# Patient Record
Sex: MALE | Race: WHITE | Employment: UNEMPLOYED | URBAN - METROPOLITAN AREA
[De-identification: names, ages, dates, MRNs, and addresses within clinical notes are randomized per-mention and may not be internally consistent; named-entity substitution may affect disease eponyms.]

---

## 2018-04-30 ENCOUNTER — HOSPITAL ENCOUNTER (OUTPATIENT)
Age: 33
Discharge: HOME | End: 2018-04-30
Payer: COMMERCIAL

## 2018-04-30 LAB
ANION GAP SERPL CALCULATED.3IONS-SCNC: 13 MMOL/L (ref 7–16)
BASOPHILS ABSOLUTE: 0.04 E9/L (ref 0–0.2)
BASOPHILS RELATIVE PERCENT: 0.7 % (ref 0–2)
BUN BLDV-MCNC: 14 MG/DL (ref 6–20)
CALCIUM SERPL-MCNC: 8.9 MG/DL (ref 8.6–10.2)
CHLORIDE BLD-SCNC: 106 MMOL/L (ref 98–107)
CO2: 21 MMOL/L (ref 22–29)
CREAT SERPL-MCNC: 2.3 MG/DL (ref 0.7–1.2)
EOSINOPHILS ABSOLUTE: 0.33 E9/L (ref 0.05–0.5)
EOSINOPHILS RELATIVE PERCENT: 5.5 % (ref 0–6)
GFR AFRICAN AMERICAN: 40
GFR NON-AFRICAN AMERICAN: 33 ML/MIN/1.73
GLUCOSE BLD-MCNC: 85 MG/DL (ref 74–109)
HCT VFR BLD CALC: 42.2 % (ref 37–54)
HEMOGLOBIN: 13.9 G/DL (ref 12.5–16.5)
IMMATURE GRANULOCYTES #: 0.01 E9/L
IMMATURE GRANULOCYTES %: 0.2 % (ref 0–5)
LYMPHOCYTES ABSOLUTE: 1.92 E9/L (ref 1.5–4)
LYMPHOCYTES RELATIVE PERCENT: 32.3 % (ref 20–42)
MCH RBC QN AUTO: 30.7 PG (ref 26–35)
MCHC RBC AUTO-ENTMCNC: 32.9 % (ref 32–34.5)
MCV RBC AUTO: 93.2 FL (ref 80–99.9)
MONOCYTES ABSOLUTE: 0.49 E9/L (ref 0.1–0.95)
MONOCYTES RELATIVE PERCENT: 8.2 % (ref 2–12)
NEUTROPHILS ABSOLUTE: 3.16 E9/L (ref 1.8–7.3)
NEUTROPHILS RELATIVE PERCENT: 53.1 % (ref 43–80)
PDW BLD-RTO: 14.6 FL (ref 11.5–15)
PHOSPHORUS: 2.3 MG/DL (ref 2.5–4.5)
PLATELET # BLD: 196 E9/L (ref 130–450)
PMV BLD AUTO: 10.9 FL (ref 7–12)
POTASSIUM SERPL-SCNC: 4.4 MMOL/L (ref 3.5–5)
RBC # BLD: 4.53 E12/L (ref 3.8–5.8)
SODIUM BLD-SCNC: 140 MMOL/L (ref 132–146)
WBC # BLD: 6 E9/L (ref 4.5–11.5)

## 2018-04-30 PROCEDURE — 85025 COMPLETE CBC W/AUTO DIFF WBC: CPT

## 2018-04-30 PROCEDURE — 80048 BASIC METABOLIC PNL TOTAL CA: CPT

## 2018-04-30 PROCEDURE — 36415 COLL VENOUS BLD VENIPUNCTURE: CPT

## 2018-04-30 PROCEDURE — 84100 ASSAY OF PHOSPHORUS: CPT

## 2018-09-04 ENCOUNTER — HOSPITAL ENCOUNTER (OUTPATIENT)
Age: 33
Discharge: HOME | End: 2018-09-04
Payer: COMMERCIAL

## 2018-09-04 LAB
ANION GAP SERPL CALCULATED.3IONS-SCNC: 16 MMOL/L (ref 7–16)
BASOPHILS ABSOLUTE: 0.02 E9/L (ref 0–0.2)
BASOPHILS RELATIVE PERCENT: 0.4 % (ref 0–2)
BUN BLDV-MCNC: 20 MG/DL (ref 6–20)
CALCIUM SERPL-MCNC: 8.7 MG/DL (ref 8.6–10.2)
CHLORIDE BLD-SCNC: 104 MMOL/L (ref 98–107)
CHOLESTEROL, FASTING: 139 MG/DL (ref 0–199)
CO2: 23 MMOL/L (ref 22–29)
CREAT SERPL-MCNC: 2.4 MG/DL (ref 0.7–1.2)
CREATININE URINE: 167 MG/DL (ref 40–278)
EOSINOPHILS ABSOLUTE: 0.18 E9/L (ref 0.05–0.5)
EOSINOPHILS RELATIVE PERCENT: 3.8 % (ref 0–6)
GFR AFRICAN AMERICAN: 38
GFR NON-AFRICAN AMERICAN: 31 ML/MIN/1.73
GLUCOSE BLD-MCNC: 171 MG/DL (ref 74–109)
HCT VFR BLD CALC: 39.3 % (ref 37–54)
HDLC SERPL-MCNC: 43 MG/DL
HEMOGLOBIN: 12.9 G/DL (ref 12.5–16.5)
IMMATURE GRANULOCYTES #: 0.01 E9/L
IMMATURE GRANULOCYTES %: 0.2 % (ref 0–5)
LDL CHOLESTEROL CALCULATED: 81 MG/DL (ref 0–99)
LYMPHOCYTES ABSOLUTE: 1.38 E9/L (ref 1.5–4)
LYMPHOCYTES RELATIVE PERCENT: 29.2 % (ref 20–42)
MCH RBC QN AUTO: 29.5 PG (ref 26–35)
MCHC RBC AUTO-ENTMCNC: 32.8 % (ref 32–34.5)
MCV RBC AUTO: 89.9 FL (ref 80–99.9)
MONOCYTES ABSOLUTE: 0.43 E9/L (ref 0.1–0.95)
MONOCYTES RELATIVE PERCENT: 9.1 % (ref 2–12)
NEUTROPHILS ABSOLUTE: 2.7 E9/L (ref 1.8–7.3)
NEUTROPHILS RELATIVE PERCENT: 57.3 % (ref 43–80)
PARATHYROID HORMONE INTACT: 210 PG/ML (ref 15–65)
PDW BLD-RTO: 13.6 FL (ref 11.5–15)
PHOSPHORUS: 3.4 MG/DL (ref 2.5–4.5)
PLATELET # BLD: 212 E9/L (ref 130–450)
PMV BLD AUTO: 11.3 FL (ref 7–12)
POTASSIUM SERPL-SCNC: 4.2 MMOL/L (ref 3.5–5)
PROTEIN PROTEIN: 24 MG/DL (ref 0–12)
PROTEIN/CREAT RATIO: 0.1
PROTEIN/CREAT RATIO: 0.1 (ref 0–0.2)
RBC # BLD: 4.37 E12/L (ref 3.8–5.8)
SODIUM BLD-SCNC: 143 MMOL/L (ref 132–146)
TRIGLYCERIDE, FASTING: 76 MG/DL (ref 0–149)
VITAMIN D 25-HYDROXY: 5 NG/ML (ref 30–100)
VLDLC SERPL CALC-MCNC: 15 MG/DL
WBC # BLD: 4.7 E9/L (ref 4.5–11.5)

## 2018-09-04 PROCEDURE — 84100 ASSAY OF PHOSPHORUS: CPT

## 2018-09-04 PROCEDURE — 80048 BASIC METABOLIC PNL TOTAL CA: CPT

## 2018-09-04 PROCEDURE — 36415 COLL VENOUS BLD VENIPUNCTURE: CPT

## 2018-09-04 PROCEDURE — 85025 COMPLETE CBC W/AUTO DIFF WBC: CPT

## 2018-09-04 PROCEDURE — 84156 ASSAY OF PROTEIN URINE: CPT

## 2018-09-04 PROCEDURE — 82306 VITAMIN D 25 HYDROXY: CPT

## 2018-09-04 PROCEDURE — 83970 ASSAY OF PARATHORMONE: CPT

## 2018-09-04 PROCEDURE — 80061 LIPID PANEL: CPT

## 2018-09-04 PROCEDURE — 82570 ASSAY OF URINE CREATININE: CPT

## 2018-09-19 ENCOUNTER — HOSPITAL ENCOUNTER (INPATIENT)
Age: 33
LOS: 4 days | Discharge: HOME | DRG: 420 | End: 2018-09-23
Attending: EMERGENCY MEDICINE | Admitting: INTERNAL MEDICINE
Payer: COMMERCIAL

## 2018-09-19 ENCOUNTER — TELEPHONE (OUTPATIENT)
Dept: ADMINISTRATIVE | Age: 33
End: 2018-09-19

## 2018-09-19 ENCOUNTER — APPOINTMENT (OUTPATIENT)
Dept: GENERAL RADIOLOGY | Age: 33
DRG: 420 | End: 2018-09-19
Payer: COMMERCIAL

## 2018-09-19 ENCOUNTER — APPOINTMENT (OUTPATIENT)
Dept: CT IMAGING | Age: 33
DRG: 420 | End: 2018-09-19
Payer: COMMERCIAL

## 2018-09-19 DIAGNOSIS — N17.9 AKI (ACUTE KIDNEY INJURY) (HCC): ICD-10-CM

## 2018-09-19 DIAGNOSIS — E13.10 DIABETIC KETOACIDOSIS WITHOUT COMA ASSOCIATED WITH OTHER SPECIFIED DIABETES MELLITUS (HCC): Primary | ICD-10-CM

## 2018-09-19 DIAGNOSIS — E87.1 HYPONATREMIA: ICD-10-CM

## 2018-09-19 DIAGNOSIS — E11.10 DIABETIC KETOACIDOSIS WITHOUT COMA ASSOCIATED WITH TYPE 2 DIABETES MELLITUS (HCC): ICD-10-CM

## 2018-09-19 DIAGNOSIS — G93.40 ACUTE ENCEPHALOPATHY: ICD-10-CM

## 2018-09-19 DIAGNOSIS — E87.5 HYPERKALEMIA: ICD-10-CM

## 2018-09-19 DIAGNOSIS — E11.9 DIABETES MELLITUS, NEW ONSET (HCC): ICD-10-CM

## 2018-09-19 LAB
ACETAMINOPHEN LEVEL: <5 MCG/ML (ref 10–30)
ALBUMIN SERPL-MCNC: 4.9 G/DL (ref 3.5–5.2)
ALP BLD-CCNC: 154 U/L (ref 40–129)
ALT SERPL-CCNC: 18 U/L (ref 0–40)
AMORPHOUS: ABNORMAL
AMPHETAMINE SCREEN, URINE: NOT DETECTED
ANION GAP SERPL CALCULATED.3IONS-SCNC: 16 MMOL/L (ref 7–16)
ANION GAP SERPL CALCULATED.3IONS-SCNC: 22 MMOL/L (ref 7–16)
AST SERPL-CCNC: 10 U/L (ref 0–39)
BACTERIA: ABNORMAL /HPF
BARBITURATE SCREEN URINE: NOT DETECTED
BASOPHILS ABSOLUTE: 0.04 E9/L (ref 0–0.2)
BASOPHILS RELATIVE PERCENT: 0.5 % (ref 0–2)
BENZODIAZEPINE SCREEN, URINE: NOT DETECTED
BETA-HYDROXYBUTYRATE: 3.07 MMOL/L (ref 0.02–0.27)
BILIRUB SERPL-MCNC: 1 MG/DL (ref 0–1.2)
BILIRUBIN URINE: NEGATIVE
BLOOD, URINE: ABNORMAL
BUN BLDV-MCNC: 74 MG/DL (ref 6–20)
BUN BLDV-MCNC: 74 MG/DL (ref 6–20)
CALCIUM SERPL-MCNC: 8.7 MG/DL (ref 8.6–10.2)
CALCIUM SERPL-MCNC: 9.2 MG/DL (ref 8.6–10.2)
CANNABINOID SCREEN URINE: NOT DETECTED
CHLORIDE BLD-SCNC: 82 MMOL/L (ref 98–107)
CHLORIDE BLD-SCNC: 96 MMOL/L (ref 98–107)
CHP ED QC CHECK: YES
CHP ED QC CHECK: YES
CLARITY: CLEAR
CO2: 16 MMOL/L (ref 22–29)
CO2: 18 MMOL/L (ref 22–29)
COCAINE METABOLITE SCREEN URINE: NOT DETECTED
COLOR: YELLOW
CREAT SERPL-MCNC: 4.5 MG/DL (ref 0.7–1.2)
CREAT SERPL-MCNC: 5.1 MG/DL (ref 0.7–1.2)
CRYSTALS, UA: ABNORMAL
EKG ATRIAL RATE: 63 BPM
EKG P AXIS: 41 DEGREES
EKG P-R INTERVAL: 198 MS
EKG Q-T INTERVAL: 440 MS
EKG QRS DURATION: 114 MS
EKG QTC CALCULATION (BAZETT): 450 MS
EKG R AXIS: 44 DEGREES
EKG T AXIS: 101 DEGREES
EKG VENTRICULAR RATE: 63 BPM
EOSINOPHILS ABSOLUTE: 0 E9/L (ref 0.05–0.5)
EOSINOPHILS RELATIVE PERCENT: 0 % (ref 0–6)
ETHANOL: <10 MG/DL (ref 0–0.08)
GFR AFRICAN AMERICAN: 16
GFR AFRICAN AMERICAN: 18
GFR NON-AFRICAN AMERICAN: 13 ML/MIN/1.73
GFR NON-AFRICAN AMERICAN: 15 ML/MIN/1.73
GLUCOSE BLD-MCNC: 1037 MG/DL (ref 74–109)
GLUCOSE BLD-MCNC: 1401 MG/DL (ref 74–109)
GLUCOSE BLD-MCNC: >1500 MG/DL (ref 74–109)
GLUCOSE BLD-MCNC: NORMAL MG/DL
GLUCOSE BLD-MCNC: NORMAL MG/DL
GLUCOSE URINE: >=1000 MG/DL
HCT VFR BLD CALC: 50.6 % (ref 37–54)
HEMOGLOBIN: 15.5 G/DL (ref 12.5–16.5)
IMMATURE GRANULOCYTES #: 0.02 E9/L
IMMATURE GRANULOCYTES %: 0.3 % (ref 0–5)
KETONES, URINE: NEGATIVE MG/DL
LACTIC ACID: 2.5 MMOL/L (ref 0.5–2.2)
LACTIC ACID: 2.6 MMOL/L (ref 0.5–2.2)
LEUKOCYTE ESTERASE, URINE: NEGATIVE
LIPASE: 29 U/L (ref 13–60)
LYMPHOCYTES ABSOLUTE: 0.61 E9/L (ref 1.5–4)
LYMPHOCYTES RELATIVE PERCENT: 8.3 % (ref 20–42)
MAGNESIUM: 3.2 MG/DL (ref 1.6–2.6)
MAGNESIUM: 3.3 MG/DL (ref 1.6–2.6)
MCH RBC QN AUTO: 29.8 PG (ref 26–35)
MCHC RBC AUTO-ENTMCNC: 30.6 % (ref 32–34.5)
MCV RBC AUTO: 97.3 FL (ref 80–99.9)
METER GLUCOSE: 301 MG/DL (ref 70–110)
METER GLUCOSE: >500 MG/DL (ref 70–110)
METHADONE SCREEN, URINE: NOT DETECTED
MONOCYTES ABSOLUTE: 0.42 E9/L (ref 0.1–0.95)
MONOCYTES RELATIVE PERCENT: 5.7 % (ref 2–12)
NEUTROPHILS ABSOLUTE: 6.28 E9/L (ref 1.8–7.3)
NEUTROPHILS RELATIVE PERCENT: 85.2 % (ref 43–80)
NITRITE, URINE: NEGATIVE
OPIATE SCREEN URINE: NOT DETECTED
OSMOLALITY: 385 MOSM/KG (ref 285–310)
PDW BLD-RTO: 13.2 FL (ref 11.5–15)
PH UA: 5.5 (ref 5–9)
PH VENOUS: 7.21 (ref 7.3–7.42)
PHENCYCLIDINE SCREEN URINE: NOT DETECTED
PHOSPHORUS: 4.1 MG/DL (ref 2.5–4.5)
PLATELET # BLD: 206 E9/L (ref 130–450)
PMV BLD AUTO: 13.5 FL (ref 7–12)
POTASSIUM SERPL-SCNC: 4.5 MMOL/L (ref 3.5–5)
POTASSIUM SERPL-SCNC: 6.8 MMOL/L (ref 3.5–5)
PROPOXYPHENE SCREEN: NOT DETECTED
PROTEIN UA: NEGATIVE MG/DL
RBC # BLD: 5.2 E12/L (ref 3.8–5.8)
RBC UA: ABNORMAL /HPF (ref 0–2)
SALICYLATE, SERUM: <0.3 MG/DL (ref 0–30)
SODIUM BLD-SCNC: 120 MMOL/L (ref 132–146)
SODIUM BLD-SCNC: 130 MMOL/L (ref 132–146)
SPECIFIC GRAVITY UA: <=1.005 (ref 1–1.03)
TOTAL PROTEIN: 8.4 G/DL (ref 6.4–8.3)
TRICYCLIC ANTIDEPRESSANTS SCREEN SERUM: NEGATIVE NG/ML
TROPONIN: <0.01 NG/ML (ref 0–0.03)
TROPONIN: <0.01 NG/ML (ref 0–0.03)
UROBILINOGEN, URINE: 0.2 E.U./DL
WBC # BLD: 7.4 E9/L (ref 4.5–11.5)
WBC UA: ABNORMAL /HPF (ref 0–5)

## 2018-09-19 PROCEDURE — 6360000002 HC RX W HCPCS: Performed by: STUDENT IN AN ORGANIZED HEALTH CARE EDUCATION/TRAINING PROGRAM

## 2018-09-19 PROCEDURE — 84100 ASSAY OF PHOSPHORUS: CPT

## 2018-09-19 PROCEDURE — 82947 ASSAY GLUCOSE BLOOD QUANT: CPT

## 2018-09-19 PROCEDURE — 96374 THER/PROPH/DIAG INJ IV PUSH: CPT

## 2018-09-19 PROCEDURE — 6360000002 HC RX W HCPCS: Performed by: INTERNAL MEDICINE

## 2018-09-19 PROCEDURE — 99285 EMERGENCY DEPT VISIT HI MDM: CPT

## 2018-09-19 PROCEDURE — 93005 ELECTROCARDIOGRAM TRACING: CPT | Performed by: STUDENT IN AN ORGANIZED HEALTH CARE EDUCATION/TRAINING PROGRAM

## 2018-09-19 PROCEDURE — 2000000000 HC ICU R&B

## 2018-09-19 PROCEDURE — 85025 COMPLETE CBC W/AUTO DIFF WBC: CPT

## 2018-09-19 PROCEDURE — 94664 DEMO&/EVAL PT USE INHALER: CPT

## 2018-09-19 PROCEDURE — 71045 X-RAY EXAM CHEST 1 VIEW: CPT

## 2018-09-19 PROCEDURE — 96375 TX/PRO/DX INJ NEW DRUG ADDON: CPT

## 2018-09-19 PROCEDURE — 82800 BLOOD PH: CPT

## 2018-09-19 PROCEDURE — 80053 COMPREHEN METABOLIC PANEL: CPT

## 2018-09-19 PROCEDURE — 2580000003 HC RX 258: Performed by: EMERGENCY MEDICINE

## 2018-09-19 PROCEDURE — 80048 BASIC METABOLIC PNL TOTAL CA: CPT

## 2018-09-19 PROCEDURE — 36415 COLL VENOUS BLD VENIPUNCTURE: CPT

## 2018-09-19 PROCEDURE — 84484 ASSAY OF TROPONIN QUANT: CPT

## 2018-09-19 PROCEDURE — 82010 KETONE BODYS QUAN: CPT

## 2018-09-19 PROCEDURE — 80307 DRUG TEST PRSMV CHEM ANLYZR: CPT

## 2018-09-19 PROCEDURE — 2580000003 HC RX 258: Performed by: STUDENT IN AN ORGANIZED HEALTH CARE EDUCATION/TRAINING PROGRAM

## 2018-09-19 PROCEDURE — 82962 GLUCOSE BLOOD TEST: CPT

## 2018-09-19 PROCEDURE — 83735 ASSAY OF MAGNESIUM: CPT

## 2018-09-19 PROCEDURE — 6370000000 HC RX 637 (ALT 250 FOR IP): Performed by: STUDENT IN AN ORGANIZED HEALTH CARE EDUCATION/TRAINING PROGRAM

## 2018-09-19 PROCEDURE — 2500000003 HC RX 250 WO HCPCS: Performed by: STUDENT IN AN ORGANIZED HEALTH CARE EDUCATION/TRAINING PROGRAM

## 2018-09-19 PROCEDURE — 83036 HEMOGLOBIN GLYCOSYLATED A1C: CPT

## 2018-09-19 PROCEDURE — 81001 URINALYSIS AUTO W/SCOPE: CPT

## 2018-09-19 PROCEDURE — 87149 DNA/RNA DIRECT PROBE: CPT

## 2018-09-19 PROCEDURE — 2580000003 HC RX 258: Performed by: INTERNAL MEDICINE

## 2018-09-19 PROCEDURE — 83605 ASSAY OF LACTIC ACID: CPT

## 2018-09-19 PROCEDURE — 6370000000 HC RX 637 (ALT 250 FOR IP): Performed by: EMERGENCY MEDICINE

## 2018-09-19 PROCEDURE — G0480 DRUG TEST DEF 1-7 CLASSES: HCPCS

## 2018-09-19 PROCEDURE — 83930 ASSAY OF BLOOD OSMOLALITY: CPT

## 2018-09-19 PROCEDURE — 83690 ASSAY OF LIPASE: CPT

## 2018-09-19 PROCEDURE — 82693 ASSAY OF ETHYLENE GLYCOL: CPT

## 2018-09-19 PROCEDURE — 87040 BLOOD CULTURE FOR BACTERIA: CPT

## 2018-09-19 RX ORDER — DEXTROSE MONOHYDRATE 25 G/50ML
12.5 INJECTION, SOLUTION INTRAVENOUS PRN
Status: DISCONTINUED | OUTPATIENT
Start: 2018-09-19 | End: 2018-09-22 | Stop reason: SDUPTHER

## 2018-09-19 RX ORDER — SODIUM POLYSTYRENE SULFONATE 15 G/60ML
15 SUSPENSION ORAL; RECTAL ONCE
Status: COMPLETED | OUTPATIENT
Start: 2018-09-19 | End: 2018-09-19

## 2018-09-19 RX ORDER — POTASSIUM CHLORIDE 7.45 MG/ML
10 INJECTION INTRAVENOUS PRN
Status: DISCONTINUED | OUTPATIENT
Start: 2018-09-19 | End: 2018-09-23 | Stop reason: HOSPADM

## 2018-09-19 RX ORDER — LABETALOL HYDROCHLORIDE 5 MG/ML
10 INJECTION, SOLUTION INTRAVENOUS EVERY 4 HOURS PRN
Status: DISCONTINUED | OUTPATIENT
Start: 2018-09-19 | End: 2018-09-23 | Stop reason: HOSPADM

## 2018-09-19 RX ORDER — POTASSIUM CHLORIDE 1.5 G/1.77G
20 POWDER, FOR SOLUTION ORAL 2 TIMES DAILY
Status: ON HOLD | COMMUNITY
End: 2018-09-23 | Stop reason: HOSPADM

## 2018-09-19 RX ORDER — 0.9 % SODIUM CHLORIDE 0.9 %
15 INTRAVENOUS SOLUTION INTRAVENOUS ONCE
Status: COMPLETED | OUTPATIENT
Start: 2018-09-19 | End: 2018-09-19

## 2018-09-19 RX ORDER — SODIUM CHLORIDE 0.9 % (FLUSH) 0.9 %
10 SYRINGE (ML) INJECTION PRN
Status: DISCONTINUED | OUTPATIENT
Start: 2018-09-19 | End: 2018-09-23 | Stop reason: HOSPADM

## 2018-09-19 RX ORDER — LOSARTAN POTASSIUM 100 MG/1
100 TABLET ORAL DAILY
COMMUNITY
End: 2018-11-07 | Stop reason: SDUPTHER

## 2018-09-19 RX ORDER — CALCIUM GLUCONATE 94 MG/ML
1 INJECTION, SOLUTION INTRAVENOUS ONCE
Status: COMPLETED | OUTPATIENT
Start: 2018-09-19 | End: 2018-09-19

## 2018-09-19 RX ORDER — 0.9 % SODIUM CHLORIDE 0.9 %
1000 INTRAVENOUS SOLUTION INTRAVENOUS ONCE
Status: COMPLETED | OUTPATIENT
Start: 2018-09-19 | End: 2018-09-19

## 2018-09-19 RX ORDER — HEPARIN SODIUM 10000 [USP'U]/ML
5000 INJECTION, SOLUTION INTRAVENOUS; SUBCUTANEOUS EVERY 8 HOURS SCHEDULED
Status: DISCONTINUED | OUTPATIENT
Start: 2018-09-19 | End: 2018-09-23 | Stop reason: HOSPADM

## 2018-09-19 RX ORDER — MAGNESIUM SULFATE 1 G/100ML
1 INJECTION INTRAVENOUS PRN
Status: DISCONTINUED | OUTPATIENT
Start: 2018-09-19 | End: 2018-09-21

## 2018-09-19 RX ORDER — SODIUM CHLORIDE 0.9 % (FLUSH) 0.9 %
10 SYRINGE (ML) INJECTION EVERY 12 HOURS SCHEDULED
Status: DISCONTINUED | OUTPATIENT
Start: 2018-09-19 | End: 2018-09-23 | Stop reason: HOSPADM

## 2018-09-19 RX ORDER — ONDANSETRON 2 MG/ML
4 INJECTION INTRAMUSCULAR; INTRAVENOUS ONCE
Status: COMPLETED | OUTPATIENT
Start: 2018-09-19 | End: 2018-09-19

## 2018-09-19 RX ORDER — DEXTROSE AND SODIUM CHLORIDE 5; .45 G/100ML; G/100ML
INJECTION, SOLUTION INTRAVENOUS CONTINUOUS PRN
Status: DISCONTINUED | OUTPATIENT
Start: 2018-09-19 | End: 2018-09-21

## 2018-09-19 RX ORDER — ONDANSETRON 2 MG/ML
4 INJECTION INTRAMUSCULAR; INTRAVENOUS EVERY 6 HOURS PRN
Status: DISCONTINUED | OUTPATIENT
Start: 2018-09-19 | End: 2018-09-23 | Stop reason: HOSPADM

## 2018-09-19 RX ORDER — SODIUM CHLORIDE 9 MG/ML
INJECTION, SOLUTION INTRAVENOUS CONTINUOUS
Status: DISCONTINUED | OUTPATIENT
Start: 2018-09-19 | End: 2018-09-21

## 2018-09-19 RX ADMIN — SODIUM CHLORIDE 11.3 UNITS/HR: 9 INJECTION, SOLUTION INTRAVENOUS at 17:21

## 2018-09-19 RX ADMIN — SODIUM POLYSTYRENE SULFONATE 15 G: 15 SUSPENSION ORAL; RECTAL at 17:03

## 2018-09-19 RX ADMIN — SODIUM BICARBONATE 50 MEQ: 84 INJECTION, SOLUTION INTRAVENOUS at 16:57

## 2018-09-19 RX ADMIN — SODIUM CHLORIDE 1701 ML: 9 INJECTION, SOLUTION INTRAVENOUS at 17:23

## 2018-09-19 RX ADMIN — HEPARIN SODIUM 5000 UNITS: 10000 INJECTION INTRAVENOUS; SUBCUTANEOUS at 22:35

## 2018-09-19 RX ADMIN — CALCIUM GLUCONATE 1 G: 98 INJECTION, SOLUTION INTRAVENOUS at 16:17

## 2018-09-19 RX ADMIN — SODIUM CHLORIDE: 9 INJECTION, SOLUTION INTRAVENOUS at 18:28

## 2018-09-19 RX ADMIN — Medication 10 ML: at 22:49

## 2018-09-19 RX ADMIN — INSULIN HUMAN 10 UNITS: 100 INJECTION, SOLUTION PARENTERAL at 16:55

## 2018-09-19 RX ADMIN — SODIUM CHLORIDE 1000 ML: 9 INJECTION, SOLUTION INTRAVENOUS at 16:08

## 2018-09-19 RX ADMIN — SODIUM CHLORIDE 1000 ML: 9 INJECTION, SOLUTION INTRAVENOUS at 16:52

## 2018-09-19 RX ADMIN — ONDANSETRON 4 MG: 2 SOLUTION INTRAMUSCULAR; INTRAVENOUS at 16:08

## 2018-09-19 RX ADMIN — ALBUTEROL SULFATE 10 MG: 2.5 SOLUTION RESPIRATORY (INHALATION) at 17:19

## 2018-09-19 ASSESSMENT — ENCOUNTER SYMPTOMS
NAUSEA: 1
EYE PAIN: 0
VOMITING: 1
COUGH: 0
SINUS PRESSURE: 0
WHEEZING: 0
SHORTNESS OF BREATH: 0
EYE DISCHARGE: 0
EYE REDNESS: 0
SORE THROAT: 0
BACK PAIN: 0
ABDOMINAL PAIN: 0
DIARRHEA: 0

## 2018-09-19 ASSESSMENT — PAIN SCALES - GENERAL
PAINLEVEL_OUTOF10: 0
PAINLEVEL_OUTOF10: 8
PAINLEVEL_OUTOF10: 0

## 2018-09-19 ASSESSMENT — PAIN DESCRIPTION - DESCRIPTORS: DESCRIPTORS: DULL

## 2018-09-19 ASSESSMENT — PAIN DESCRIPTION - ONSET: ONSET: ON-GOING

## 2018-09-19 ASSESSMENT — PAIN DESCRIPTION - FREQUENCY: FREQUENCY: CONTINUOUS

## 2018-09-19 ASSESSMENT — PAIN DESCRIPTION - LOCATION: LOCATION: ABDOMEN

## 2018-09-19 ASSESSMENT — PAIN DESCRIPTION - PAIN TYPE: TYPE: ACUTE PAIN

## 2018-09-19 ASSESSMENT — PAIN DESCRIPTION - ORIENTATION: ORIENTATION: UPPER;LOWER

## 2018-09-19 NOTE — H&P
tablet Take 1 tablet by mouth daily 3/13/17  Yes Makayla Champagne MD   aspirin 81 MG tablet Take 1 tablet by mouth daily 3/13/17  Yes Makayla Champagne MD   furosemide (LASIX) 20 MG tablet Take 0.5 tablets by mouth daily  Patient taking differently: Take 10 mg by mouth 2 times daily  3/13/17  Yes Makayla Champagne MD   metoprolol succinate (TOPROL XL) 100 MG extended release tablet Take 1 tablet by mouth daily  Patient taking differently: Take 200 mg by mouth daily  3/13/17  Yes Makayla Champagne MD   atorvastatin (LIPITOR) 20 MG tablet Take 1 tablet by mouth daily 3/13/17  Yes Makayla Champagne MD   lisinopril (PRINIVIL;ZESTRIL) 10 MG tablet Take 1 tablet by mouth daily 9/9/16  Yes Cy Escobedo MD   Blood Pressure Monitoring (ADULT BLOOD PRESSURE CUFF LG) KIT 1 kit by Does not apply route daily 9/6/16   Makayla Champagne MD       Allergies:  Patient has no known allergies. Social History:   TOBACCO:   reports that he has been smoking Pipe. He has a 8.00 pack-year smoking history. He has never used smokeless tobacco.  ETOH:   reports that he drinks alcohol. Family History:   History reviewed. No pertinent family history. REVIEW OF SYSTEMS:    · Constitutional:  fever,  chills,  Fatigue,no change in weight; good appetite,lightheadness  · HEENT: blurred vision, no ear problems, no sore throat, no rhinorrhea. · Respiratory: cough, no sputum production, no pleuritic chest pain,  shortness of breath  · Cardiology: No angina, no dyspnea on exertion, no paroxysmal nocturnal dyspnea, no orthopnea, no palpitation, no leg swelling. · Gastroenterology: No dysphagia, no reflux; no abdominal pain,  nausea or vomiting; no constipation or diarrhea.  No hematochezia   · Genitourinary: No dysuria, no frequency, hesitancy; no hematuria  · Musculoskeletal: no joint pain, no myalgia, no change in range of movement  · Neurology: no focal weakness in extremities, no slurred speech, no double vision, no tingling or numbness sensation  · Endocrinology: no temperature intolerance, no polyphagia, polydipsia or polyuria  · Hematology: no increased bleeding, no bruising, no lymphadenopathy  · Skin: no skin changes noticed by patient  · Psychology: no depressed mood, no suicidal ideation    Physical Exam   · Vitals: BP (!) 141/75   Pulse 73   Temp 97.8 °F (36.6 °C) (Temporal)   Resp 14   Ht 5' 9\" (1.753 m)   Wt 255 lb (115.7 kg)   SpO2 98%   BMI 37.66 kg/m²     · General Appearance: drowsy, alert and oriented to person, place not to time, well-developed and well-nourished, in no acute distress, tongue dry.   · Skin: warm and dry, no rash or erythema  · Head: normocephalic and atraumatic  · Eyes: pupils equal, round, and reactive to light, extraocular eye movements intact, conjunctivae normal  · ENT: hearing grossly normal bilaterally  · Neck: neck supple and non tender without mass, no thyromegaly or thyroid nodules, no cervical lymphadenopathy   · Pulmonary/Chest: clear to auscultation bilaterally- no wheezes, rales or rhonchi, normal air movement, no respiratory distress  · Cardiovascular: normal rate, normal S1 and S2, no gallops, intact distal pulses and no carotid bruits  · Abdomen: soft, non-tender, non-distended, normal bowel sounds, no masses or organomegaly  · Extremities: no cyanosis and no clubbing  · Musculoskeletal: normal range of motion, no joint swelling, deformity or tenderness  · Neurologic: no cranial nerve deficit, sensation to light touch intact, muscle strength normal and drowsy,confused  Labs and Imaging Studies   Basic Labs  Recent Labs      09/19/18   1543   NA  120*   K  6.8*   CL  82*   CO2  16*   BUN  74*   CREATININE  5.1*   GLUCOSE  >1,500*   CALCIUM  9.2       Recent Labs      09/19/18   1543   WBC  7.4   RBC  5.20   HGB  15.5   HCT  50.6   MCV  97.3   MCH  29.8   MCHC  30.6*   RDW  13.2   PLT  206   MPV  13.5*     CBC:   Lab Results   Component Value Date    WBC 7.4 09/19/2018    RBC 5.20 09/19/2018 HGB 15.5 2018    HCT 50.6 2018    MCV 97.3 2018    RDW 13.2 2018     2018     CMP:  Lab Results   Component Value Date     2018    K 4.5 2018    CL 96 2018    CO2 18 2018    BUN 74 2018    PROT 8.4 2018     B-hydroxy butyrate:3.07  UDS: negative  Imaging Studies:     Xr Chest Portable    Result Date: 2018      No airspace opacities or pleural effusion. EKG: normal sinus rhythm, unchanged from previous tracings. Resident's Assessment and Plan     Kane Salgado is a 28 y.o. male with PMh of HTN, CKD, Nephrotic syndrome who presented to ED with AMS    1. Acute Encephalopathy  -Likely 2/2Hyperosmolar ketoacidosis vs Diabetic Ketoacidosis  - New onset DM  - Presented with AMS   -BG>1500,A, PH:7.21, Beta hydroxybutyrate +, S. Osmolarity: 385  -Na 130, K 4.5, CO2 18 on presentation  -NPO for now  -Check A1c  -start DKA protocol  - glucose checks q1h until blood glucose reaches 250mg/dL for 2 hours without variance of >10% for 2 consecutive readings  -BMP with Mg and Phos q4h for now  -UDS :negative  -Osmolar Gap: 20  -check methanol and ethanol glycol level      2. HAGMA  -likely 2/2 Diabetic ketoacidosis vs lactic acidosis  - initial AG 22, lactic acid:2.5  - continue Iv fluid  -trend Lactic acid q 4hr  -Check ABG  - Osmolar Gap: 20  - check methanol and ethanol glycol level    3. Clerance Riis HTN  - Last Echo: EF 55-60%, stage II diastolic [id number](6373)  -on aspirin, amlodipine,lasix, toprol xl,losartan  -BP:124/70 on presentation    4. HANNY on CKD  -hx Nephrotic syndrome  - follow Dr. Bisi HATFIELD cr: 4.5 on presentation    PT/OT evaluation  DVT prophylaxis/ GI prophylaxis: heparin  Disposition: MANUEL Garland MD, PGY-1   Internal Medicine Resident    Attending physician: Dr. Danyelle Salcedo

## 2018-09-19 NOTE — ED PROVIDER NOTES
The patient is a 19-year-old male who presents emergency Department to be evaluated for altered mental status. The past 5 days the patient has been much more lethargic at home and \"not acting himself. \" He has had 2 falls subsequent to his legs \"giving out and becoming weak. \" He denies any head injury or pain following the fall. He has been urinating much more, despite no history of diabetes mellitus in the past. He will blurry vision several days ago and presented to his optometrist office, who told the patient that his prescription had changed drastically. He has had 3 episodes of vomiting over the past 2-3 days. There is no blood or coffee grounds in the vomit. He has had diarrhea as well during this time, denies any abdominal pain. He denies any fevers or chills. There is no neck stiffness or rigidity. There is been no hematuria or dysuria. He has a past medical history significant for severe hypertension for which she takes multiple medications and stage IV chronic kidney disease which she follows with nephrology. His past medical history is also significant for alcohol abuse in the past.      The history is provided by the patient and a significant other. Review of Systems   Constitutional: Positive for activity change, appetite change and fatigue. Negative for chills and fever. HENT: Negative for ear pain, sinus pressure and sore throat. Eyes: Positive for visual disturbance. Negative for pain, discharge and redness. Respiratory: Negative for cough, shortness of breath and wheezing. Cardiovascular: Negative for chest pain. Gastrointestinal: Positive for nausea and vomiting. Negative for abdominal pain and diarrhea. Genitourinary: Negative for dysuria and frequency. Musculoskeletal: Positive for gait problem. Negative for arthralgias and back pain. Skin: Negative for rash and wound. Neurological: Positive for weakness. Negative for headaches. Hematological: Negative for adenopathy. they are agreeable with the plan.      --------------------------------- ADDITIONAL PROVIDER NOTES ---------------------------------  Consultations:  Spoke with Dr. Ana Maria Gibson, he accepts the Patient to the MICU. Spoke with Dr. Funmilayo Bird, house medicine attending, they will admit the patient. This patient's ED course included: a personal history and physicial examination, re-evaluation prior to disposition, multiple bedside re-evaluations, IV medications, cardiac monitoring, continuous pulse oximetry and complex medical decision making and emergency management    This patient has been closely monitored during their ED course. Please note that the withdrawal or failure to initiate urgent interventions for this patient would likely result in a life threatening deterioration or permanent disability. Accordingly this patient received 30 minutes of critical care time, excluding separately billable procedures. Systems at risk for deterioration include: all. Clinical Impression  1. Diabetic ketoacidosis without coma associated with other specified diabetes mellitus ([facility] [address]    2. Diabetes mellitus, new onset ([address]    3. Acute encephalopathy    4. Hyperkalemia    5.  HANNY (acute kidney injury) ([address]          Disposition  Patient's disposition: Admit to CCU/ICU  Patient's condition is critical.           Lefty Vicente DO  Resident  09/19/18 8665

## 2018-09-20 ENCOUNTER — APPOINTMENT (OUTPATIENT)
Dept: CT IMAGING | Age: 33
DRG: 420 | End: 2018-09-20
Payer: COMMERCIAL

## 2018-09-20 ENCOUNTER — APPOINTMENT (OUTPATIENT)
Dept: GENERAL RADIOLOGY | Age: 33
DRG: 420 | End: 2018-09-20
Payer: COMMERCIAL

## 2018-09-20 LAB
ALBUMIN SERPL-MCNC: 4.2 G/DL (ref 3.5–5.2)
ALP BLD-CCNC: 126 U/L (ref 40–129)
ALT SERPL-CCNC: 15 U/L (ref 0–40)
ANION GAP SERPL CALCULATED.3IONS-SCNC: 13 MMOL/L (ref 7–16)
ANION GAP SERPL CALCULATED.3IONS-SCNC: 14 MMOL/L (ref 7–16)
ANION GAP SERPL CALCULATED.3IONS-SCNC: 17 MMOL/L (ref 7–16)
AST SERPL-CCNC: 11 U/L (ref 0–39)
BILIRUB SERPL-MCNC: 1.2 MG/DL (ref 0–1.2)
BILIRUBIN DIRECT: 0.3 MG/DL (ref 0–0.3)
BILIRUBIN, INDIRECT: 0.9 MG/DL (ref 0–1)
BUN BLDV-MCNC: 57 MG/DL (ref 6–20)
BUN BLDV-MCNC: 59 MG/DL (ref 6–20)
BUN BLDV-MCNC: 65 MG/DL (ref 6–20)
BUN BLDV-MCNC: 68 MG/DL (ref 6–20)
BUN BLDV-MCNC: 69 MG/DL (ref 6–20)
CALCIUM SERPL-MCNC: 8.6 MG/DL (ref 8.6–10.2)
CALCIUM SERPL-MCNC: 9.1 MG/DL (ref 8.6–10.2)
CALCIUM SERPL-MCNC: 9.2 MG/DL (ref 8.6–10.2)
CALCIUM SERPL-MCNC: 9.3 MG/DL (ref 8.6–10.2)
CALCIUM SERPL-MCNC: 9.4 MG/DL (ref 8.6–10.2)
CHLORIDE BLD-SCNC: 102 MMOL/L (ref 98–107)
CHLORIDE BLD-SCNC: 109 MMOL/L (ref 98–107)
CHLORIDE BLD-SCNC: 110 MMOL/L (ref 98–107)
CHLORIDE BLD-SCNC: 113 MMOL/L (ref 98–107)
CHLORIDE BLD-SCNC: 113 MMOL/L (ref 98–107)
CO2: 18 MMOL/L (ref 22–29)
CO2: 20 MMOL/L (ref 22–29)
CO2: 20 MMOL/L (ref 22–29)
CO2: 22 MMOL/L (ref 22–29)
CO2: 22 MMOL/L (ref 22–29)
CREAT SERPL-MCNC: 4.3 MG/DL (ref 0.7–1.2)
CREAT SERPL-MCNC: 4.3 MG/DL (ref 0.7–1.2)
CREAT SERPL-MCNC: 4.4 MG/DL (ref 0.7–1.2)
CREAT SERPL-MCNC: 4.5 MG/DL (ref 0.7–1.2)
CREAT SERPL-MCNC: 4.6 MG/DL (ref 0.7–1.2)
GFR AFRICAN AMERICAN: 18
GFR AFRICAN AMERICAN: 18
GFR AFRICAN AMERICAN: 19
GFR NON-AFRICAN AMERICAN: 15 ML/MIN/1.73
GFR NON-AFRICAN AMERICAN: 16 ML/MIN/1.73
GFR NON-AFRICAN AMERICAN: 16 ML/MIN/1.73
GLUCOSE BLD-MCNC: 238 MG/DL (ref 74–109)
GLUCOSE BLD-MCNC: 357 MG/DL (ref 74–109)
GLUCOSE BLD-MCNC: 384 MG/DL (ref 74–109)
GLUCOSE BLD-MCNC: 635 MG/DL (ref 74–109)
GLUCOSE BLD-MCNC: 848 MG/DL (ref 74–109)
GLUCOSE BLD-MCNC: 867 MG/DL (ref 74–109)
HBA1C MFR BLD: 10.5 % (ref 4–5.6)
LACTIC ACID: 2 MMOL/L (ref 0.5–2.2)
LACTIC ACID: 2.6 MMOL/L (ref 0.5–2.2)
MAGNESIUM: 2.3 MG/DL (ref 1.6–2.6)
MAGNESIUM: 2.5 MG/DL (ref 1.6–2.6)
MAGNESIUM: 2.8 MG/DL (ref 1.6–2.6)
MAGNESIUM: 3.2 MG/DL (ref 1.6–2.6)
MAGNESIUM: 3.4 MG/DL (ref 1.6–2.6)
METER GLUCOSE: 205 MG/DL (ref 70–110)
METER GLUCOSE: 231 MG/DL (ref 70–110)
METER GLUCOSE: 264 MG/DL (ref 70–110)
METER GLUCOSE: 268 MG/DL (ref 70–110)
METER GLUCOSE: 319 MG/DL (ref 70–110)
METER GLUCOSE: 323 MG/DL (ref 70–110)
METER GLUCOSE: 325 MG/DL (ref 70–110)
METER GLUCOSE: 352 MG/DL (ref 70–110)
METER GLUCOSE: 353 MG/DL (ref 70–110)
METER GLUCOSE: 359 MG/DL (ref 70–110)
METER GLUCOSE: 389 MG/DL (ref 70–110)
METER GLUCOSE: 474 MG/DL (ref 70–110)
METER GLUCOSE: >500 MG/DL (ref 70–110)
METER GLUCOSE: >500 MG/DL (ref 70–110)
PHOSPHORUS: 3.2 MG/DL (ref 2.5–4.5)
PHOSPHORUS: 3.4 MG/DL (ref 2.5–4.5)
PHOSPHORUS: 3.7 MG/DL (ref 2.5–4.5)
PHOSPHORUS: 4 MG/DL (ref 2.5–4.5)
PHOSPHORUS: 5 MG/DL (ref 2.5–4.5)
POTASSIUM SERPL-SCNC: 3.9 MMOL/L (ref 3.5–5)
POTASSIUM SERPL-SCNC: 4 MMOL/L (ref 3.5–5)
POTASSIUM SERPL-SCNC: 4.1 MMOL/L (ref 3.5–5)
POTASSIUM SERPL-SCNC: 4.3 MMOL/L (ref 3.5–5)
POTASSIUM SERPL-SCNC: 4.5 MMOL/L (ref 3.5–5)
PRO-BNP: 117 PG/ML (ref 0–125)
REPORT: NORMAL
REPORT: NORMAL
SODIUM BLD-SCNC: 141 MMOL/L (ref 132–146)
SODIUM BLD-SCNC: 141 MMOL/L (ref 132–146)
SODIUM BLD-SCNC: 146 MMOL/L (ref 132–146)
SODIUM BLD-SCNC: 146 MMOL/L (ref 132–146)
SODIUM BLD-SCNC: 147 MMOL/L (ref 132–146)
TOTAL PROTEIN: 7.5 G/DL (ref 6.4–8.3)
TROPONIN: 0.02 NG/ML (ref 0–0.03)
TSH SERPL DL<=0.05 MIU/L-ACNC: 0.44 UIU/ML (ref 0.27–4.2)

## 2018-09-20 PROCEDURE — 83880 ASSAY OF NATRIURETIC PEPTIDE: CPT

## 2018-09-20 PROCEDURE — 83516 IMMUNOASSAY NONANTIBODY: CPT

## 2018-09-20 PROCEDURE — 84443 ASSAY THYROID STIM HORMONE: CPT

## 2018-09-20 PROCEDURE — 74176 CT ABD & PELVIS W/O CONTRAST: CPT

## 2018-09-20 PROCEDURE — 86341 ISLET CELL ANTIBODY: CPT

## 2018-09-20 PROCEDURE — 2580000003 HC RX 258: Performed by: INTERNAL MEDICINE

## 2018-09-20 PROCEDURE — 6370000000 HC RX 637 (ALT 250 FOR IP): Performed by: INTERNAL MEDICINE

## 2018-09-20 PROCEDURE — 84484 ASSAY OF TROPONIN QUANT: CPT

## 2018-09-20 PROCEDURE — 36556 INSERT NON-TUNNEL CV CATH: CPT

## 2018-09-20 PROCEDURE — 71045 X-RAY EXAM CHEST 1 VIEW: CPT

## 2018-09-20 PROCEDURE — 83605 ASSAY OF LACTIC ACID: CPT

## 2018-09-20 PROCEDURE — 36415 COLL VENOUS BLD VENIPUNCTURE: CPT

## 2018-09-20 PROCEDURE — 02HV33Z INSERTION OF INFUSION DEVICE INTO SUPERIOR VENA CAVA, PERCUTANEOUS APPROACH: ICD-10-PCS | Performed by: INTERNAL MEDICINE

## 2018-09-20 PROCEDURE — 82962 GLUCOSE BLOOD TEST: CPT

## 2018-09-20 PROCEDURE — 83735 ASSAY OF MAGNESIUM: CPT

## 2018-09-20 PROCEDURE — 80048 BASIC METABOLIC PNL TOTAL CA: CPT

## 2018-09-20 PROCEDURE — 6360000002 HC RX W HCPCS: Performed by: EMERGENCY MEDICINE

## 2018-09-20 PROCEDURE — 36592 COLLECT BLOOD FROM PICC: CPT

## 2018-09-20 PROCEDURE — B548ZZA ULTRASONOGRAPHY OF SUPERIOR VENA CAVA, GUIDANCE: ICD-10-PCS | Performed by: INTERNAL MEDICINE

## 2018-09-20 PROCEDURE — 2000000000 HC ICU R&B

## 2018-09-20 PROCEDURE — 84100 ASSAY OF PHOSPHORUS: CPT

## 2018-09-20 PROCEDURE — 80076 HEPATIC FUNCTION PANEL: CPT

## 2018-09-20 PROCEDURE — 70450 CT HEAD/BRAIN W/O DYE: CPT

## 2018-09-20 PROCEDURE — 6360000002 HC RX W HCPCS: Performed by: INTERNAL MEDICINE

## 2018-09-20 PROCEDURE — 99231 SBSQ HOSP IP/OBS SF/LOW 25: CPT | Performed by: INTERNAL MEDICINE

## 2018-09-20 PROCEDURE — 6370000000 HC RX 637 (ALT 250 FOR IP): Performed by: EMERGENCY MEDICINE

## 2018-09-20 PROCEDURE — 2580000003 HC RX 258: Performed by: EMERGENCY MEDICINE

## 2018-09-20 PROCEDURE — 82947 ASSAY GLUCOSE BLOOD QUANT: CPT

## 2018-09-20 RX ORDER — DEXTROSE MONOHYDRATE 25 G/50ML
12.5 INJECTION, SOLUTION INTRAVENOUS PRN
Status: DISCONTINUED | OUTPATIENT
Start: 2018-09-20 | End: 2018-09-23 | Stop reason: HOSPADM

## 2018-09-20 RX ORDER — INSULIN GLARGINE 100 [IU]/ML
20 INJECTION, SOLUTION SUBCUTANEOUS NIGHTLY
Status: DISCONTINUED | OUTPATIENT
Start: 2018-09-20 | End: 2018-09-21

## 2018-09-20 RX ORDER — AMLODIPINE BESYLATE 10 MG/1
10 TABLET ORAL DAILY
Status: DISCONTINUED | OUTPATIENT
Start: 2018-09-20 | End: 2018-09-23 | Stop reason: HOSPADM

## 2018-09-20 RX ORDER — ATORVASTATIN CALCIUM 20 MG/1
20 TABLET, FILM COATED ORAL DAILY
Status: DISCONTINUED | OUTPATIENT
Start: 2018-09-21 | End: 2018-09-23 | Stop reason: HOSPADM

## 2018-09-20 RX ORDER — DEXTROSE MONOHYDRATE 50 MG/ML
100 INJECTION, SOLUTION INTRAVENOUS PRN
Status: DISCONTINUED | OUTPATIENT
Start: 2018-09-20 | End: 2018-09-23 | Stop reason: HOSPADM

## 2018-09-20 RX ORDER — NICOTINE POLACRILEX 4 MG
15 LOZENGE BUCCAL PRN
Status: DISCONTINUED | OUTPATIENT
Start: 2018-09-20 | End: 2018-09-23 | Stop reason: HOSPADM

## 2018-09-20 RX ADMIN — HEPARIN SODIUM 5000 UNITS: 10000 INJECTION INTRAVENOUS; SUBCUTANEOUS at 05:41

## 2018-09-20 RX ADMIN — POTASSIUM CHLORIDE 10 MEQ: 7.46 INJECTION, SOLUTION INTRAVENOUS at 08:03

## 2018-09-20 RX ADMIN — POTASSIUM CHLORIDE 10 MEQ: 7.46 INJECTION, SOLUTION INTRAVENOUS at 02:43

## 2018-09-20 RX ADMIN — POTASSIUM CHLORIDE 10 MEQ: 7.46 INJECTION, SOLUTION INTRAVENOUS at 06:37

## 2018-09-20 RX ADMIN — HEPARIN SODIUM 5000 UNITS: 10000 INJECTION INTRAVENOUS; SUBCUTANEOUS at 21:18

## 2018-09-20 RX ADMIN — INSULIN LISPRO 4 UNITS: 100 INJECTION, SOLUTION INTRAVENOUS; SUBCUTANEOUS at 16:59

## 2018-09-20 RX ADMIN — POTASSIUM CHLORIDE 10 MEQ: 7.46 INJECTION, SOLUTION INTRAVENOUS at 01:20

## 2018-09-20 RX ADMIN — SODIUM CHLORIDE 5.65 UNITS/HR: 9 INJECTION, SOLUTION INTRAVENOUS at 01:00

## 2018-09-20 RX ADMIN — HEPARIN SODIUM 5000 UNITS: 10000 INJECTION INTRAVENOUS; SUBCUTANEOUS at 13:30

## 2018-09-20 RX ADMIN — POTASSIUM CHLORIDE 10 MEQ: 7.46 INJECTION, SOLUTION INTRAVENOUS at 11:26

## 2018-09-20 RX ADMIN — POTASSIUM CHLORIDE 10 MEQ: 7.46 INJECTION, SOLUTION INTRAVENOUS at 13:07

## 2018-09-20 RX ADMIN — SODIUM CHLORIDE: 9 INJECTION, SOLUTION INTRAVENOUS at 08:19

## 2018-09-20 RX ADMIN — Medication 10 ML: at 09:02

## 2018-09-20 RX ADMIN — POTASSIUM CHLORIDE 10 MEQ: 7.46 INJECTION, SOLUTION INTRAVENOUS at 03:55

## 2018-09-20 RX ADMIN — DEXTROSE AND SODIUM CHLORIDE: 5; 450 INJECTION, SOLUTION INTRAVENOUS at 15:00

## 2018-09-20 RX ADMIN — INSULIN GLARGINE 20 UNITS: 100 INJECTION, SOLUTION SUBCUTANEOUS at 16:59

## 2018-09-20 RX ADMIN — INSULIN LISPRO 5 UNITS: 100 INJECTION, SOLUTION INTRAVENOUS; SUBCUTANEOUS at 21:18

## 2018-09-20 RX ADMIN — POTASSIUM CHLORIDE 10 MEQ: 7.46 INJECTION, SOLUTION INTRAVENOUS at 12:13

## 2018-09-20 RX ADMIN — AMLODIPINE BESYLATE 10 MG: 10 TABLET ORAL at 18:20

## 2018-09-20 ASSESSMENT — PAIN SCALES - GENERAL
PAINLEVEL_OUTOF10: 0

## 2018-09-20 NOTE — PROGRESS NOTES
swelling, no muscle tenderness. ROM normal in all joints of extremities.    · Neurologic: Mental status: Alert, oriented, thought content appropriate  Subject  Pertinent Labs & Imaging Studies   torie  CBC with Differential:    Lab Results   Component Value Date    WBC 7.4 09/19/2018    RBC 5.20 09/19/2018    HGB 15.5 09/19/2018    HCT 50.6 09/19/2018     09/19/2018    MCV 97.3 09/19/2018    MCH 29.8 09/19/2018    MCHC 30.6 09/19/2018    RDW 13.2 09/19/2018    LYMPHOPCT 8.3 09/19/2018    MONOPCT 5.7 09/19/2018    BASOPCT 0.5 09/19/2018    MONOSABS 0.42 09/19/2018    LYMPHSABS 0.61 09/19/2018    EOSABS 0.00 09/19/2018    BASOSABS 0.04 09/19/2018     CMP:    Lab Results   Component Value Date     09/20/2018    K 4.1 09/20/2018     09/20/2018    CO2 20 09/20/2018    BUN 65 09/20/2018    CREATININE 4.6 09/20/2018    GFRAA 18 09/20/2018    LABGLOM 15 09/20/2018    GLUCOSE 384 09/20/2018    GLUCOSE 85 03/14/2011    PROT 7.5 09/20/2018    LABALBU 4.2 09/20/2018    LABALBU 4.4 03/14/2011    CALCIUM 9.2 09/20/2018    BILITOT 1.2 09/20/2018    ALKPHOS 126 09/20/2018    AST 11 09/20/2018    ALT 15 09/20/2018     Calcium:    Lab Results   Component Value Date    CALCIUM 9.2 09/20/2018     Magnesium:    Lab Results   Component Value Date    MG 2.8 09/20/2018     Phosphorus:    Lab Results   Component Value Date    PHOS 3.7 09/20/2018     ABG:  No results found for: PH, PCO2, PO2, HCO3, BE, THGB, TCO2, O2SAT  TSH:    Lab Results   Component Value Date    TSH 0.436 09/20/2018       Resident's Assessment and Plan     Diabetic Ketoacidosis- resolving  -Continue Fluid resuscitation, Insulin Drip, potassium replacement, and glucose replacement   -convert to Subq insulin when appropriate  -Replace electrolytes as needed   -Methanol and Ethylene glycol levels ordered   -Antibodies to evaluate for SAGAR   -A1c 10.5  -Lipid panel normal; troponin and EKG normal; no infectious process   -If lab tests negative, likely stress induced with glucose loading. The ASCVD Risk score (Gill Schumacher, et al., 2013) failed to calculate for the following reasons:     The 2013 ASCVD risk score is only valid for ages 36 to 78    Acute Encephalopathy - resolving   -likely 2/2 hyper   -UDS negative; awaiting methanol and ethylene glycol levels 2/2 HANNY and vision problems   -Vision problems and acute encephalopathy likely 2/2 HHS and DKA   -CT head negative    HAGMA - resolving   -likely mix of LA and DKA   -continue hydration     HTN  -Last Echo: EF 55-60%, stage II diastolic [id number](3/4904)  -on aspirin, amlodipine,lasix, toprol xl,losartan outpatient  -Anti-hypertensives held 2/2 normotension   -PRN labetalol     HANNY on CKD IV -resolving   -continue hydration  -holding losartan and lasix   -Nephrology consulted input appreciated   -CT abdomen:     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 Flaquito Ng

## 2018-09-20 NOTE — CONSULTS
past 6 hrs:   BP Temp Temp src Pulse Resp SpO2   09/20/18 0200 (!) 167/96 - - 65 20 -   09/20/18 0100 (!) 114/57 - - 73 17 100 %   09/20/18 0000 - 97.9 °F (36.6 °C) Temporal 72 8 98 %   09/19/18 2300 (!) 143/85 - - 69 21 96 %   09/19/18 2200 137/73 - - 70 19 98 %         Intake/Output Summary (Last 24 hours) at 09/20/18 0300  Last data filed at 09/20/18 0000   Gross per 24 hour   Intake                0 ml   Output              875 ml   Net             -875 ml     Wt Readings from Last 2 Encounters:   09/19/18 255 lb (115.7 kg)   03/13/17 258 lb (117 kg)     Body mass index is 37.66 kg/m².       PHYSICAL EXAMINATION:  General appearance - alert, well appearing, and in no distress  Mental status - drowsy, oriented to place and person only  Eyes - pupils equal and reactive, extraocular eye movements intact  Mouth - dry mucous membrances  Neck - supple, no significant adenopathy  Chest - clear to auscultation, no wheezes, rales or rhonchi, symmetric air entry  Heart - normal rate, regular rhythm, normal S1, S2, no murmurs, rubs, clicks or gallops  Abdomen - soft, nontender, nondistended, no masses or organomegaly  Neurological - neck supple without rigidity, cranial nerves II through XII intact, DTR's normal and symmetric}  Extremities - peripheral pulses normal, no pedal edema, no clubbing or cyanosis  Skin - normal coloration and turgor, no rashes, no suspicious skin lesions noted      Any additional physical findings:    MEDICATIONS:  Scheduled Meds:   sodium chloride flush  10 mL Intravenous 2 times per day    heparin (porcine)  5,000 Units Subcutaneous 3 times per day     Continuous Infusions:   sodium chloride 100 mL/hr at 09/19/18 1828    sodium chloride      dextrose 5 % and 0.45 % NaCl      insulin (HUMAN R) non-weight based infusion 2.83 Units/hr (09/20/18 0102)     PRN Meds:     dextrose 12.5 g PRN   potassium chloride 10 mEq PRN   magnesium sulfate 1 g PRN   sodium phosphate IVPB 10 mmol PRN   Or [] Drawn     [] Negative             []  Positive (Details:  )  Sputum Culture:               [] Drawn       [] Negative             []  Positive (Details:  )   Endotracheal aspirate:     [] Drawn       [] Negative             []  Positive (Details:  )       ASSESSMENT  And PLAN:     1. Acute Encephalopathy  -Likely 2/2Hyperosmolar ketoacidosis vs Diabetic Ketoacidosis  - New onset DM  - Presented with AMS   -BG>1500,A, PH:7.21, Beta hydroxybutyrate +, S. Osmolarity: 385  -Na 130, K 4.5, CO2 18 on presentation  -NPO for now  -A1C 11.4  -start DKA protocol  -BMP with Mg and Phos q4h for now  -UDS: negative  -Osmolar Gap: elevated, DKA? Most likely, no evidence of substance abuse  -check methanol and ethanol glycol level     2. HAGMA  -likely 2/2 Diabetic ketoacidosis vs lactic acidosis  - initial AG 22, lactic acid:2.5  - continue Iv fluid  -trend Lactic acid q 4hr  -Check ABG  - Osmolar Gap elevated, likely DKA  - check methanol and ethanol glycol level     3. Jhonny Hoyos HTN  - Last Echo: EF 55-60%, stage II diastolic [id number](7964)  -on aspirin, amlodipine,lasix, toprol xl,losartan  -BP:124/70 on presentation     4. HANNY on CKD  -hx Nephrotic syndrome  - follow Dr. Yefri Garrido  -S cr: 4.5 on presentation        WEAN PER PROTOCOL:  [] No   [] Yes  [x] N/A    ICU PROPHYLAXIS:  Stress ulcer:  [x] PPI Agent  [] [id number] [] Sucralfate  [] Other:  VTE:   [] Enoxaparin  [x] Unfract. Heparin Subcut  [] EPC Cuffs    NUTRITION:  [x] NPO [] Tube Feeding (Specify: ) [] TPN  [] PO (Diet: Diet NPO Effective Now)    INSULIN DRIP:   [] No   [x] Yes    CONSULTATION NEEDED:   [x] No   [] Yes    FAMILY UPDATED:    [] No   [x] Yes    TRANSFER OUT OF ICU:   [x] No   [] Yes    Priscila Fraga M.D. Internal Medicine Resident - PGY 2    Attending Physician: Dr. Eliezer Marinelli Attending Addendum:    Patient seen and examined with the house staff. X-rays personally reviewed through the PACS.   Family is updated at the bedside as available. Additional findings listed below as necessary. Additional comments:  1. Combination of DKA and hyperosmolar nonketotic state with markedly elevated glucoses. Continue insulin gtt as ag still 14. Continue hydration. Doubt alternative ingestions. 2. Blurred vision from hyperglycemia as was altered mental status. 3. HANNY on CKD, continue hydration. Dr. Jack Bates consulted.     30 min CCT

## 2018-09-20 NOTE — CONSULTS
Nephrology Consult  [facility]  Carrie Dean MD    CC:   arf on ckd 3    HPI:  The pt is a 29 yo male who was admitted with mental status changes and blurry vision. He was found to have dka with a blood sugar over 1500. He has been started on the dka protocol. His creatinine on admission was 4.5 with a baseline creatinine of 2.5 from 9/4/18. He is followed by dr colon in the office who he saw 9/10. His protein to creatinine ratio was 0.1. His outpatient lasix and zestril are on hold. Initial labs showed na 120, k 6.8, co2 16, bun 74, cr 5.1, wbc 7.4, hgb 15.5, plt 206. He is a poor historian. PMH:    Past Medical History:   Diagnosis Date    Chest pain     HTN (hypertension)     Hydrocele, left 10/28/2010       Patient Active Problem List   Diagnosis    HTN (hypertension)    Atypical chest pain    Hypertensive emergency    HANNY [facility]acute [facility] injury) ([facility] [address]    Hypokalemia    LVH (left ventricular hypertrophy)    Tobacco abuse    ETOH abuse    Noncompliance with medication regimen    Elevated troponin    Diabetic acidosis without coma (HCC)       Meds:     sodium chloride flush  10 mL Intravenous 2 times per day    heparin (porcine)  5,000 Units Subcutaneous 3 times per day        dextrose      sodium chloride 100 mL/hr at 09/20/18 0819    sodium chloride      dextrose 5 % and 0.45 % NaCl      insulin (HUMAN R) non-weight based infusion 1.1 Units/hr (09/20/18 0901)       Meds prn:     glucose, dextrose, glucagon (rDNA), dextrose, dextrose, potassium chloride, magnesium sulfate, sodium phosphate IVPB **OR** sodium phosphate IVPB **OR** sodium phosphate IVPB, dextrose 5 % and 0.45 % NaCl, labetalol, sodium chloride flush, magnesium hydroxide, ondansetron    Meds prior to admission:     No current facility-administered medications on file prior to encounter.       Current Outpatient Prescriptions on File Prior to Encounter   Medication Sig Dispense Refill    minoxidil (LONITEN) 10 MG

## 2018-09-20 NOTE — PROGRESS NOTES
[facility] Quality Flow/Interdisciplinary Rounds Progress Note        Quality Flow Rounds held on September 20, 2018    Disciplines Attending:  Bedside Nurse, Charge nurse, HOA, OT, PT, , and . Selwyn Yepez was admitted on 9/19/2018  3:02 PM    Anticipated Discharge Date:  Expected Discharge Date: 09/30/18    Disposition:    Eulogio Score:  Eulogio Scale Score: 18    Readmission Risk              Risk of Unplanned Readmission:        13             Discussed patient goal for the day, patient clinical progression, and barriers to discharge. The following Goal(s) of the Day/Commitment(s) have been identified:  Monitor blood glucose, monitor labs and vital signs.        Anita Allison  September 20, 2018

## 2018-09-20 NOTE — PLAN OF CARE
Problem: Risk for Impaired Skin Integrity  Goal: Tissue integrity - skin and mucous membranes  Structural intactness and normal physiological function of skin and  mucous membranes. Outcome: Met This Shift  Plan of care discussed with patient / family.

## 2018-09-20 NOTE — PROGRESS NOTES
[address] [facility]  Internal Medicine Residency / [address]    Attending Physician Statement  I have discussed the case, including pertinent history and exam findings with the resident and the team.  I have seen and examined the patient and the key elements of the encounter have been performed by me. I agree with the assessment, plan and orders as documented by the resident. A&O  VS stable  DKA Hyperosmolar resoved  Labs and meds reviewed   Hx of CRF from HTN  Plan: Continue same ICU care   Remainder of medical problems as per resident note.       [facility]  Internal Medicine Residency Faculty

## 2018-09-20 NOTE — PROCEDURES
Procedure: right internal jugular vein Triple Lumen Catheter placement. Indications: vascular access    Consent: The spouse was counseled regarding the procedure, its indications, risks, potential complications and alternatives, and any questions were answered. Consent was obtained to proceed. Number of sticks: 1    Number of Kits used: 1    Procedure: The patient was place in the trendelenburg position and the skin over the right internal jugular vein was prepped with Chloraprep. Local anesthesia was obtained by infiltration using 1% Lidocaine without epinephrine. With Ultrasound guidance a large bore needle was used to identify the vein, dark non pulsatile blood returned. The guide wire was then inserted through the needle with minimal resistance. 2 mm nick was made in the skin beside the guidewire. Then a dilator was inserted and removed. A triple lumen catheter was then inserted into the vessel over the guide wire using the Seldinger technique to the 15 cm tanya. All ports showed good, free flowing blood return and were flushed with saline solution. The catheter was then securely fastened to the skin with sutures and covered with a bio patch and sterile dressing. A post procedure X-ray was ordered and is still pending at this time. Complications: None   The patient tolerated the procedure well. Estimated blood loss: 5 ml. Rubie Ganser, M.D. Internal Medicine Resident - PGY 2    Attending physician: Dr. Anju Weinberg, was available during the entire procedure.

## 2018-09-20 NOTE — PROGRESS NOTES
atraumatic, no cyanosis or edema  · Musculokeletal: No joint swelling, no muscle tenderness. ROM normal in all joints of extremities. · Neurologic: Alert, oriented, thought content appropriate    Pertinent Labs & Imaging Studies     CBC:   Lab Results   Component Value Date    WBC 7.4 2018    RBC 5.20 2018    HGB 15.5 2018    HCT 50.6 2018    MCV 97.3 2018    MCH 29.8 2018    MCHC 30.6 2018    RDW 13.2 2018     2018    MPV 13.5 2018     BMP:    Lab Results   Component Value Date     2018    K 4.1 2018     2018    CO2 20 2018    BUN 65 2018    LABALBU 4.2 2018    LABALBU 4.4 2011    CREATININE 4.6 2018    CALCIUM 9.2 2018    GFRAA 18 2018    LABGLOM 15 2018    GLUCOSE 384 2018    GLUCOSE 85 2011     Magnesium:    Lab Results   Component Value Date    MG 2.8 2018     Phosphorus:    Lab Results   Component Value Date    PHOS 3.7 2018     HgBA1c:    Lab Results   Component Value Date    LABA1C 10.5 2018     TSH:    Lab Results   Component Value Date    TSH 0.436 2018       Resident's Assessment and Plan     Alice Sandoval is a 28 y.o. male with PMh of HTN, CKD, Nephrotic syndrome who presented to ED with AMS    1. Acute Encephalopathy  -Likely 2/2 Hyperosmolar ketoacidosis vs Diabetic Ketoacidosis  - New onset DM  - Presented with AMS   - BG>1500,A, PH:7.21, Beta hydroxybutyrate +, S. Osmolarity: 385  -Na 130, K 4.5, CO2 18 on presentation  -NPO for now  -continue DKA protocol  - glucose checks q1h until blood glucose reaches 250mg/dL for 2 hours without variance of >10% for 2 consecutive readings  -BMP with Mg and Phos q4h for now  -UDS :negative  -Osmolar Gap: 20  -check methanol and ethanol glycol level     2.  HAGMA  -likely 2/2 Diabetic ketoacidosis vs lactic acidosis  - initial AG 22, lactic acid:2.5  - continue Iv fluid  -trend Lactic acid q 4hr  -Check ABG  - Osmolar Gap: 20  - check methanol and ethanol glycol level   -  3. HTN  - Last Echo: EF 55-60%, stage II diastolic [id number](5/4968)  - on aspirin, amlodipine,lasix, toprol xl,losartan  - BP:124/70 on presentation  - prn labetolol for BP control   -  4.  HANNY on CKD  - hx CKD  - follow Dr. Bill Rey  - S cr: 4.5 on presentation    PT/OT evaluation:  DVT prophylaxis/ GI prophylaxis:   Disposition: MICU      Attending physician: Dr. Kim Lake

## 2018-09-21 LAB
ALBUMIN SERPL-MCNC: 3.7 G/DL (ref 3.5–5.2)
ALP BLD-CCNC: 89 U/L (ref 40–129)
ALT SERPL-CCNC: 14 U/L (ref 0–40)
ANION GAP SERPL CALCULATED.3IONS-SCNC: 13 MMOL/L (ref 7–16)
ANION GAP SERPL CALCULATED.3IONS-SCNC: 13 MMOL/L (ref 7–16)
ANION GAP SERPL CALCULATED.3IONS-SCNC: 16 MMOL/L (ref 7–16)
ANION GAP SERPL CALCULATED.3IONS-SCNC: 18 MMOL/L (ref 7–16)
AST SERPL-CCNC: 14 U/L (ref 0–39)
BASOPHILS ABSOLUTE: 0.03 E9/L (ref 0–0.2)
BASOPHILS RELATIVE PERCENT: 0.5 % (ref 0–2)
BILIRUB SERPL-MCNC: 1.4 MG/DL (ref 0–1.2)
BILIRUBIN DIRECT: 0.3 MG/DL (ref 0–0.3)
BILIRUBIN, INDIRECT: 1.1 MG/DL (ref 0–1)
BUN BLDV-MCNC: 42 MG/DL (ref 6–20)
BUN BLDV-MCNC: 45 MG/DL (ref 6–20)
BUN BLDV-MCNC: 47 MG/DL (ref 6–20)
BUN BLDV-MCNC: 48 MG/DL (ref 6–20)
CALCIUM SERPL-MCNC: 8.3 MG/DL (ref 8.6–10.2)
CALCIUM SERPL-MCNC: 8.5 MG/DL (ref 8.6–10.2)
CALCIUM SERPL-MCNC: 8.6 MG/DL (ref 8.6–10.2)
CALCIUM SERPL-MCNC: 8.8 MG/DL (ref 8.6–10.2)
CHLORIDE BLD-SCNC: 102 MMOL/L (ref 98–107)
CHLORIDE BLD-SCNC: 103 MMOL/L (ref 98–107)
CHLORIDE BLD-SCNC: 104 MMOL/L (ref 98–107)
CHLORIDE BLD-SCNC: 108 MMOL/L (ref 98–107)
CO2: 18 MMOL/L (ref 22–29)
CO2: 19 MMOL/L (ref 22–29)
CO2: 20 MMOL/L (ref 22–29)
CO2: 21 MMOL/L (ref 22–29)
CREAT SERPL-MCNC: 3.2 MG/DL (ref 0.7–1.2)
CREAT SERPL-MCNC: 3.4 MG/DL (ref 0.7–1.2)
CREAT SERPL-MCNC: 3.4 MG/DL (ref 0.7–1.2)
CREAT SERPL-MCNC: 3.9 MG/DL (ref 0.7–1.2)
EOSINOPHILS ABSOLUTE: 0.28 E9/L (ref 0.05–0.5)
EOSINOPHILS RELATIVE PERCENT: 4.7 % (ref 0–6)
GFR AFRICAN AMERICAN: 22
GFR AFRICAN AMERICAN: 25
GFR AFRICAN AMERICAN: 25
GFR AFRICAN AMERICAN: 27
GFR NON-AFRICAN AMERICAN: 18 ML/MIN/1.73
GFR NON-AFRICAN AMERICAN: 21 ML/MIN/1.73
GFR NON-AFRICAN AMERICAN: 21 ML/MIN/1.73
GFR NON-AFRICAN AMERICAN: 22 ML/MIN/1.73
GLUCOSE BLD-MCNC: 244 MG/DL (ref 74–109)
GLUCOSE BLD-MCNC: 252 MG/DL (ref 74–109)
GLUCOSE BLD-MCNC: 344 MG/DL (ref 74–109)
GLUCOSE BLD-MCNC: 346 MG/DL (ref 74–109)
HCT VFR BLD CALC: 37.3 % (ref 37–54)
HEMOGLOBIN: 12.7 G/DL (ref 12.5–16.5)
IMMATURE GRANULOCYTES #: 0.01 E9/L
IMMATURE GRANULOCYTES %: 0.2 % (ref 0–5)
LYMPHOCYTES ABSOLUTE: 2.28 E9/L (ref 1.5–4)
LYMPHOCYTES RELATIVE PERCENT: 38.2 % (ref 20–42)
MAGNESIUM: 2.1 MG/DL (ref 1.6–2.6)
MAGNESIUM: 2.2 MG/DL (ref 1.6–2.6)
MAGNESIUM: 2.3 MG/DL (ref 1.6–2.6)
MCH RBC QN AUTO: 30 PG (ref 26–35)
MCHC RBC AUTO-ENTMCNC: 34 % (ref 32–34.5)
MCV RBC AUTO: 88 FL (ref 80–99.9)
METER GLUCOSE: 193 MG/DL (ref 70–110)
METER GLUCOSE: 233 MG/DL (ref 70–110)
METER GLUCOSE: 241 MG/DL (ref 70–110)
METER GLUCOSE: 255 MG/DL (ref 70–110)
METER GLUCOSE: 271 MG/DL (ref 70–110)
METER GLUCOSE: 307 MG/DL (ref 70–110)
METER GLUCOSE: 317 MG/DL (ref 70–110)
METER GLUCOSE: 333 MG/DL (ref 70–110)
METER GLUCOSE: 372 MG/DL (ref 70–110)
MONOCYTES ABSOLUTE: 0.36 E9/L (ref 0.1–0.95)
MONOCYTES RELATIVE PERCENT: 6 % (ref 2–12)
NEUTROPHILS ABSOLUTE: 3.01 E9/L (ref 1.8–7.3)
NEUTROPHILS RELATIVE PERCENT: 50.4 % (ref 43–80)
PDW BLD-RTO: 13.2 FL (ref 11.5–15)
PHOSPHORUS: 3.3 MG/DL (ref 2.5–4.5)
PHOSPHORUS: 3.5 MG/DL (ref 2.5–4.5)
PHOSPHORUS: 4.2 MG/DL (ref 2.5–4.5)
PLATELET # BLD: 123 E9/L (ref 130–450)
PMV BLD AUTO: 12.3 FL (ref 7–12)
POTASSIUM SERPL-SCNC: 3.7 MMOL/L (ref 3.5–5)
POTASSIUM SERPL-SCNC: 3.7 MMOL/L (ref 3.5–5)
POTASSIUM SERPL-SCNC: 3.8 MMOL/L (ref 3.5–5)
POTASSIUM SERPL-SCNC: 4.2 MMOL/L (ref 3.5–5)
RBC # BLD: 4.24 E12/L (ref 3.8–5.8)
SODIUM BLD-SCNC: 136 MMOL/L (ref 132–146)
SODIUM BLD-SCNC: 138 MMOL/L (ref 132–146)
SODIUM BLD-SCNC: 138 MMOL/L (ref 132–146)
SODIUM BLD-SCNC: 143 MMOL/L (ref 132–146)
TOTAL PROTEIN: 6.6 G/DL (ref 6.4–8.3)
WBC # BLD: 6 E9/L (ref 4.5–11.5)

## 2018-09-21 PROCEDURE — 6370000000 HC RX 637 (ALT 250 FOR IP): Performed by: INTERNAL MEDICINE

## 2018-09-21 PROCEDURE — 2580000003 HC RX 258: Performed by: INTERNAL MEDICINE

## 2018-09-21 PROCEDURE — 84100 ASSAY OF PHOSPHORUS: CPT

## 2018-09-21 PROCEDURE — 99231 SBSQ HOSP IP/OBS SF/LOW 25: CPT | Performed by: INTERNAL MEDICINE

## 2018-09-21 PROCEDURE — 80076 HEPATIC FUNCTION PANEL: CPT

## 2018-09-21 PROCEDURE — 80048 BASIC METABOLIC PNL TOTAL CA: CPT

## 2018-09-21 PROCEDURE — 36415 COLL VENOUS BLD VENIPUNCTURE: CPT

## 2018-09-21 PROCEDURE — 6360000002 HC RX W HCPCS: Performed by: EMERGENCY MEDICINE

## 2018-09-21 PROCEDURE — 85025 COMPLETE CBC W/AUTO DIFF WBC: CPT

## 2018-09-21 PROCEDURE — 83735 ASSAY OF MAGNESIUM: CPT

## 2018-09-21 PROCEDURE — 2000000000 HC ICU R&B

## 2018-09-21 PROCEDURE — 6360000002 HC RX W HCPCS: Performed by: INTERNAL MEDICINE

## 2018-09-21 PROCEDURE — 82962 GLUCOSE BLOOD TEST: CPT

## 2018-09-21 PROCEDURE — 2500000003 HC RX 250 WO HCPCS: Performed by: INTERNAL MEDICINE

## 2018-09-21 RX ORDER — MINOXIDIL 2.5 MG/1
10 TABLET ORAL 3 TIMES DAILY
Status: DISCONTINUED | OUTPATIENT
Start: 2018-09-21 | End: 2018-09-23 | Stop reason: HOSPADM

## 2018-09-21 RX ORDER — INSULIN GLARGINE 100 [IU]/ML
25 INJECTION, SOLUTION SUBCUTANEOUS NIGHTLY
Status: DISCONTINUED | OUTPATIENT
Start: 2018-09-21 | End: 2018-09-21

## 2018-09-21 RX ORDER — FUROSEMIDE 20 MG/1
10 TABLET ORAL 2 TIMES DAILY
Status: DISCONTINUED | OUTPATIENT
Start: 2018-09-21 | End: 2018-09-21

## 2018-09-21 RX ORDER — ERGOCALCIFEROL 1.25 MG/1
50000 CAPSULE ORAL
Status: DISCONTINUED | OUTPATIENT
Start: 2018-09-21 | End: 2018-09-23 | Stop reason: HOSPADM

## 2018-09-21 RX ORDER — INSULIN GLARGINE 100 [IU]/ML
30 INJECTION, SOLUTION SUBCUTANEOUS NIGHTLY
Status: DISCONTINUED | OUTPATIENT
Start: 2018-09-21 | End: 2018-09-22

## 2018-09-21 RX ADMIN — POTASSIUM CHLORIDE 10 MEQ: 7.46 INJECTION, SOLUTION INTRAVENOUS at 08:32

## 2018-09-21 RX ADMIN — MINOXIDIL 10 MG: 2.5 TABLET ORAL at 13:48

## 2018-09-21 RX ADMIN — MINOXIDIL 10 MG: 2.5 TABLET ORAL at 09:09

## 2018-09-21 RX ADMIN — INSULIN GLARGINE 30 UNITS: 100 INJECTION, SOLUTION SUBCUTANEOUS at 22:47

## 2018-09-21 RX ADMIN — ATORVASTATIN CALCIUM 20 MG: 20 TABLET, FILM COATED ORAL at 09:09

## 2018-09-21 RX ADMIN — ERGOCALCIFEROL 50000 UNITS: 1.25 CAPSULE, LIQUID FILLED ORAL at 20:09

## 2018-09-21 RX ADMIN — MINOXIDIL 10 MG: 2.5 TABLET ORAL at 20:09

## 2018-09-21 RX ADMIN — INSULIN LISPRO 9 UNITS: 100 INJECTION, SOLUTION INTRAVENOUS; SUBCUTANEOUS at 08:30

## 2018-09-21 RX ADMIN — Medication 10 ML: at 09:10

## 2018-09-21 RX ADMIN — INSULIN LISPRO 15 UNITS: 100 INJECTION, SOLUTION INTRAVENOUS; SUBCUTANEOUS at 11:58

## 2018-09-21 RX ADMIN — SODIUM CHLORIDE 7.71 UNITS/HR: 9 INJECTION, SOLUTION INTRAVENOUS at 16:26

## 2018-09-21 RX ADMIN — HEPARIN SODIUM 5000 UNITS: 10000 INJECTION INTRAVENOUS; SUBCUTANEOUS at 06:40

## 2018-09-21 RX ADMIN — POTASSIUM CHLORIDE 10 MEQ: 7.46 INJECTION, SOLUTION INTRAVENOUS at 10:44

## 2018-09-21 RX ADMIN — Medication 10 ML: at 20:09

## 2018-09-21 RX ADMIN — AMLODIPINE BESYLATE 10 MG: 10 TABLET ORAL at 09:09

## 2018-09-21 RX ADMIN — INSULIN LISPRO 5 UNITS: 100 INJECTION, SOLUTION INTRAVENOUS; SUBCUTANEOUS at 12:00

## 2018-09-21 RX ADMIN — POTASSIUM CHLORIDE 10 MEQ: 7.46 INJECTION, SOLUTION INTRAVENOUS at 09:47

## 2018-09-21 RX ADMIN — INSULIN LISPRO 5 UNITS: 100 INJECTION, SOLUTION INTRAVENOUS; SUBCUTANEOUS at 00:22

## 2018-09-21 RX ADMIN — LABETALOL HYDROCHLORIDE 10 MG: 5 INJECTION INTRAVENOUS at 22:48

## 2018-09-21 RX ADMIN — HEPARIN SODIUM 5000 UNITS: 10000 INJECTION INTRAVENOUS; SUBCUTANEOUS at 22:47

## 2018-09-21 RX ADMIN — LABETALOL HYDROCHLORIDE 10 MG: 5 INJECTION INTRAVENOUS at 08:17

## 2018-09-21 RX ADMIN — HEPARIN SODIUM 5000 UNITS: 10000 INJECTION INTRAVENOUS; SUBCUTANEOUS at 13:49

## 2018-09-21 ASSESSMENT — PAIN SCALES - GENERAL
PAINLEVEL_OUTOF10: 0

## 2018-09-21 NOTE — PROGRESS NOTES
76 17 93 % -   09/20/18 1300 134/70 - - 78 18 93 % -   09/20/18 1200 - 97.8 °F (36.6 °C) Temporal 77 18 96 % -   @      Intake/Output Summary (Last 24 hours) at 09/21/18 1113  Last data filed at 09/21/18 1100   Gross per 24 hour   Intake             3650 ml   Output             1275 ml   Net             2375 ml         Wt Readings from Last 3 Encounters:   09/21/18 240 lb (108.9 kg)   03/13/17 258 lb (117 kg)   11/10/16 263 lb 0.8 oz (119.3 kg)       Constitutional:  Pt is in no acute distress  Head: normocephalic, atraumatic  Neck: no JVD  Cardiovascular: regular rate and rhythm, no murmurs, gallops, or rubs  Respiratory:  No rales, rhochi, or wheezes  Gastrointestinal:  Soft, nontender, nondistended, bowel sounds x 4  Ext: no edema  Skin: dry, no rash  Neuro: aaox3    MEDS (scheduled):    minoxidil  10 mg Oral TID    insulin glargine  25 Units Subcutaneous Nightly    amLODIPine  10 mg Oral Daily    atorvastatin  20 mg Oral Daily    insulin lispro  0-18 Units Subcutaneous TID WC    insulin lispro  0-9 Units Subcutaneous Nightly    sodium chloride flush  10 mL Intravenous 2 times per day    heparin (porcine)  5,000 Units Subcutaneous 3 times per day       MEDS (infusions):   dextrose         MEDS (prn):  glucose, dextrose, glucagon (rDNA), dextrose, dextrose, potassium chloride, labetalol, sodium chloride flush, magnesium hydroxide, ondansetron    DATA:    Recent Labs      09/19/18   1543   WBC  7.4   HGB  15.5   HCT  50.6   MCV  97.3   PLT  206     Recent Labs      09/19/18   1543   09/20/18   0440   09/20/18   1650  09/20/18   2015  09/21/18   0515   NA  120*   < >  146   < >  146  141  143   K  6.8*   < >  4.5   < >  3.9  4.0  3.8   CL  82*   < >  110*   < >  113*  109*  108*   CO2  16*   < >  22   < >  20*  18*  19*   BUN  74*   < >  69*   < >  59*  57*  48*   CREATININE  5.1*   < >  4.5*   < >  4.3*  4.3*  3.9*   LABGLOM  13   < >  15   < >  16  16  18   GLUCOSE  >1,500*   < >  635*   < >  238*  357*

## 2018-09-21 NOTE — PROGRESS NOTES
Low    Nutrition Interventions: Continued Inpatient Monitoring, Coordination of Care, Education Completed (CHO Control diet explained- carb food sources, food label reading, building a healthy plate. handouts w/ contact # provided)    Nutrition Evaluation:   · Evaluation: Goals set   · Goals: dietary compliance w/ glucose WNL    · Monitoring: Meal Intake, Comparative Standards, Weight, Pertinent Labs, Patient/Family Education, Skin Integrity, Fluid Balance, Diet Tolerance    See Adult Nutrition Doc Flowsheet for more detail.      Electronically signed by Sheri Lindquist RD, LD on 9/21/18 at 4:04 PM    Contact Number: Ext [phone]

## 2018-09-21 NOTE — PLAN OF CARE
Problem: Nutrition  Goal: Optimal nutrition therapy  Outcome: Ongoing  Nutrition Problem: Food and nutrition-related knowledge deficit  Intervention: Food and/or Nutrient Delivery: Continue current diet, ONS not indicated  Nutritional Goals: dietary compliance w/ glucose WNL

## 2018-09-21 NOTE — PROGRESS NOTES
[address] [facility]  Department of Internal Medicine  Internal Medicine Residency Program  Resident MICU Progress  Note      Patient:  Kane Salgado 28 y.o. male MRN: [id number]     Date of Service: 9/21/2018    Allergy: Patient has no known allergies. CC: F/u: DKA  Subjective     Patient was seen and examined this morning at bedside, he reported he feels much better, still has some blurry vision. He has good appetite, tolerates diet well. AG was closed twice yesterday, he was then bridged. However, AG was climbing up and reopened again. He was restarted on insulin drip at 4 pm.    Objective   Physical Exam:  · Vitals: BP (!) 168/88   Pulse 77   Temp 97.6 °F (36.4 °C) (Temporal)   Resp 19   Ht 5' 9\" (1.753 m)   Wt 240 lb (108.9 kg)   SpO2 97%   BMI 35.44 kg/m²     · I & O - 24hr: In: 2050 [P.O.:2050]  · Out: 1025 [Urine:1025]  · General Appearance: alert, appears stated age and cooperative  · HEENT:  Head: Normocephalic, no lesions, without obvious abnormality. · Neck: no adenopathy, no carotid bruit, no JVD, supple, symmetrical, trachea midline and thyroid not enlarged, symmetric, no tenderness/mass/nodules  · Lung: clear to auscultation bilaterally  · Heart: regular rate and rhythm, S1, S2 normal, no murmur, click, rub or gallop  · Abdomen: soft, non-tender; bowel sounds normal; no masses,  no organomegaly  · Extremities:  extremities normal, atraumatic, no cyanosis or edema  · Musculokeletal: No joint swelling, no muscle tenderness. ROM normal in all joints of extremities.    · Neurologic: Mental status: Alert, oriented, thought content appropriate    Pertinent New Labs & Imaging Studies     CBC with Differential:    Lab Results   Component Value Date    WBC 6.0 09/21/2018    RBC 4.24 09/21/2018    HGB 12.7 09/21/2018    HCT 37.3 09/21/2018     09/21/2018    MCV 88.0 09/21/2018    MCH 30.0 09/21/2018    MCHC 34.0 09/21/2018    RDW 13.2 09/21/2018    LYMPHOPCT 38.2 09/21/2018    MONOT 9/20/2018   FINDINGS:   Brain:  Unremarkable. No hemorrhage. No significant white matter disease. No edema. Ventricles:  Unremarkable. No hydrocephalus. Bones/joints:  Unremarkable. No acute fracture. Soft tissues:  Unremarkable. Sinuses:  Trace chronic paranasal sinus disease. Mastoid air cells:  Unremarkable as visualized. No mastoid effusion. No acute intracranial abnormality. This report has been electronically signed by Cora Patrick MD.        Resident's [address] [place] [address]   The patient is a 28 y. o. male  With PMH of HTN, CKD stage IV who presented to ED with AMS. Per patient he was feeling lethargic and feeling sick since Sunday. He was admitted to MICU for management of DKA and HHS and newly diagnosed DM. 1. Acute metabolic encephalopathy, resolved  2. DKA, HHS, AG closed yesterday for twice, bridged to Lantus 20 units nightly + SSI; however GAP was reopened  3. Newly diagnosed diabetes, HgA1C > 10  4. HAGMA, due to DKA  5. HANNY on CKD stage IV, Cr. Baseline about 2.5, now improving to 3.4  6. Hypertension, home med amlodipine, losartan, Minoxidil, furosemide, metoprolol  7. HFpEF (55-60%), stage II DD    Plan  · Restart insulin drip, monitor BMP, Mg, Phos every 4 hours  · Resume amlodipine and minoxidil, monitor BP  · Plan to bridge to Lantus 30 unitis nightly + SSI  · Follow up results of GAD65 and IA antibody for type of DM  · Nephrology follow for HANNY on CKD, hold off ACEI and furosemide  · Continue carb control diet  · DVT prophylaxis: Heparin    One bottle of blood culture grow gram positive cocci in cluster, later shows staph coagulase negative, likely skin floral contamination, no signs of sepsis, no fever, no WBC. Zuleika Orellana M.D., PGY 2    Attending physician: Dr. Robert Wynn Attending Addendum:    Patient seen and examined with the house staff. X-rays personally reviewed through the PACS. Family is updated at the bedside as available.   Additional findings listed below as necessary. Additional comments:  1. DKA has worsened and insulin gtt resumed. 2. Acute on chronic renal failure continue hydration-improving. 3. HTN acceptable on home medical regimen.

## 2018-09-21 NOTE — PLAN OF CARE
Problem: Serum Glucose Level - Abnormal:  Goal: Ability to maintain appropriate glucose levels will improve  Ability to maintain appropriate glucose levels will improve   Outcome: Met This Shift  Plan of care discussed with patient / family.

## 2018-09-21 NOTE — CONSULTS
injection 10 mL, 10 mL, Intravenous, 2 times per day, Gerhardt Cuff, MD, 10 mL at 09/21/18 0910  sodium chloride flush 0.9 % injection 10 mL, 10 mL, Intravenous, PRN, Gerhardt Cuff, MD  magnesium hydroxide (MILK OF MAGNESIA) 400 MG/5ML suspension 30 mL, 30 mL, Oral, Daily PRN, Gerhardt Cuff, MD  ondansetron (ZOFRAN) injection 4 mg, 4 mg, Intravenous, Q6H PRN, Gerhardt Cuff, MD  heparin (porcine) injection 5,000 Units, 5,000 Units, Subcutaneous, 3 times per day, Gerhardt Cuff, MD, 5,000 Units at 09/21/18 1349      PAST OPHTHALMIC HISTORY:    No glasses. Ophthalmic exam:    NEAR VISION: OD 20/30. OS 20/30  without correction, 6 inches. EXTERNAL EXAM: Lids  WNL OU. No Ptosis OU. Pupils Equally round and reactive to light OU with no APD. Extraocular motility: Intact OU. No nystagmus. Confrontational visual fields: Full OU. Tactile tension less than 20 mmHg OU. ANTERIOR SEGMENT EXAM: Conjunctiva white and quiet OU. Cornea clear OU. Anterior chamber deep and clear OU with No hyphema. POSTERIOR SEGMENT EXAM: Posterior pole WNL OU. No vitreous hemorrhage OU. Assessment:  DM  Myopic shift due to high BS. Recommendation:  Control BS levels. Orders Not Written. Follow up in my office 5-7 days after discharge. Office phone # [phone]. Please  patient regarding Importance of making and keeping appointment. Petra Tamez MD, [address] [facility], [address] [place] [address].

## 2018-09-21 NOTE — PROGRESS NOTES
[facility]  Internal Medicine Residency Program  Progress Note - House Team 1    Patient:  Joss Rinaldi 28 y.o. male MRN: [id number]     Date of Service: 9/21/2018     CC: AMS and DKA    Subjective     Patient was seen and examined at bedside. He states that his nausea, vomiting, and confusion have resolved since his blood sugar is controlled. All questions were answered. He states that since his confusion has remitted he remembered that he was placed on Zyprexa on 08/15/18 for \"mood stabilization\" by Amita. He believed that he was getting blurry vision 2/2 the Zyprexa and stopped it cold turkey on 09/10/18. However, this did not help his vision clear. He was placed on Zyprexa 2/2 him using Adderall and Zyprexa as a child and he wanted to have something to help him focus with his wife having a child. He confirms his CKD is 2/2 uncontrolled HTN when he was younger. Objective     Physical Exam:  · Vitals: BP (!) 168/88   Pulse 77   Temp 97.6 °F (36.4 °C) (Temporal)   Resp 19   Ht 5' 9\" (1.753 m)   Wt 240 lb (108.9 kg)   SpO2 97%   BMI 35.44 kg/m²     I & O - 24hr: I/O this shift:  In: 600 [P.O.:600]  · Out: 250 [Urine:250]   · General Appearance: alert, appears stated age and cooperative  · HEENT:  Head: Normocephalic, no lesions, without obvious abnormality. · Eye: Normal external eye, conjunctiva, lids, cataract formation present in BL lens, SHRUTHI, pupils are dilated but reactive to light, reduced peripheral and central vision, EOMI  · Ears: Normal TM's bilaterally. Normal auditory canals and external ears. Non-tender. · Nose: Normal external nose, mucus membranes and septum. · Pharynx: Dental Hygiene adequate. Normal buccal mucosa. Normal pharynx.   · Neck: no adenopathy, no carotid bruit, no JVD, supple, symmetrical, trachea midline and thyroid not enlarged, symmetric, no tenderness/mass/nodules  · Lung: clear to auscultation bilaterally  · Heart: regular rate and rhythm, S1, S2 normal, no murmur, click, rub or gallop  · Abdomen: soft, non-tender; bowel sounds normal; no masses,  no organomegaly  · Extremities:  extremities normal, atraumatic, no cyanosis or edema  · Musculokeletal: No joint swelling, no muscle tenderness. ROM normal in all joints of extremities.    · Neurologic: Mental status: Alert, oriented, thought content appropriate  Subject  Pertinent Labs & Imaging Studies   torie  CBC with Differential:    Lab Results   Component Value Date    WBC 7.4 09/19/2018    RBC 5.20 09/19/2018    HGB 15.5 09/19/2018    HCT 50.6 09/19/2018     09/19/2018    MCV 97.3 09/19/2018    MCH 29.8 09/19/2018    MCHC 30.6 09/19/2018    RDW 13.2 09/19/2018    LYMPHOPCT 8.3 09/19/2018    MONOPCT 5.7 09/19/2018    BASOPCT 0.5 09/19/2018    MONOSABS 0.42 09/19/2018    LYMPHSABS 0.61 09/19/2018    EOSABS 0.00 09/19/2018    BASOSABS 0.04 09/19/2018     CMP:    Lab Results   Component Value Date     09/21/2018    K 3.8 09/21/2018     09/21/2018    CO2 19 09/21/2018    BUN 48 09/21/2018    CREATININE 3.9 09/21/2018    GFRAA 22 09/21/2018    LABGLOM 18 09/21/2018    GLUCOSE 244 09/21/2018    GLUCOSE 85 03/14/2011    PROT 6.6 09/21/2018    LABALBU 3.7 09/21/2018    LABALBU 4.4 03/14/2011    CALCIUM 8.6 09/21/2018    BILITOT 1.4 09/21/2018    ALKPHOS 89 09/21/2018    AST 14 09/21/2018    ALT 14 09/21/2018     Calcium:    Lab Results   Component Value Date    CALCIUM 8.6 09/21/2018     Magnesium:    Lab Results   Component Value Date    MG 2.3 09/21/2018     Phosphorus:    Lab Results   Component Value Date    PHOS 4.2 09/21/2018     ABG:  No results found for: PH, PCO2, PO2, HCO3, BE, THGB, TCO2, O2SAT  TSH:    Lab Results   Component Value Date    TSH 0.436 09/20/2018       Resident's Assessment and Plan     Diabetic Ketoacidosis- resolved  -Converted to SSI and Glargine 25 U  -Replace electrolytes as needed   -Methanol and Ethylene glycol levels ordered   -Antibodies to

## 2018-09-21 NOTE — PLAN OF CARE
Problem: Intellectual/Education/Knowledge Deficit  Goal: Teaching initiated upon admission  Outcome: Not Met This Shift  Diabetic teaching

## 2018-09-21 NOTE — PROGRESS NOTES
Reason for consult:  New diabetes; Patient was experiencing blurred vision, extreme thirst and tiredness since 9/9/18. Family history of both type 1 and type 2 DM. Type of diabetes:   [] Type 1   [] Type 2     [x] Labs pending     Newly-diagnosed? [x] Yes   [] No  Blood glucose range over the last 24 hours: 244-384 mg/dl  A1C: 10.5%     [] Not available   Diabetes medications prior to admission:   None               Patient has glucometer and supplies at home? []Yes      [x] No     [x] Needs a script   Patient monitors glucose at home? [x] No   [] Daily   [] BID   [] TID   [] QID   [] HS    Patient currently exercises at home? []Yes     [x] No      Comment: Plans to start walking/biking     Patient states he/she has the following barriers to diabetes self-management:       [] Food (cost/availability)       []  Medication (cost/schedule conflicts)                  [] Stress            [] Pain         []  Other:                    Diabetes survival packet reviewed? [] With patient     [] With family/s.o.     [x] Both    Information reviewed: Definition of diabetes      Target glucose ranges/A1C      Self-monitoring of blood glucose      Prevention/symptoms/treatment of hypo-/hyperglycemia      Medication adherence      The plate method/meal planning guidelines      The benefits of exercise and recommendations      Reducing the risk of chronic complications       Diabetes support group     Diabetes survival packet not reviewed:   []Education not appropriate    []Other:     New home medications reviewed (use, purpose, action):   Long and rapid-acting     Evaluation/Plan/Recommendations:   Patient's understanding of diabetes:   []Poor   [x]Average   []Above average    Outpatient diabetes education is recommended:   [x] Yes  [] No  Patient is interested in outpatient diabetes education:   [x] Yes    [] No    [] Unsure  Educator will contact pt. post-discharge to schedule classes:  [x] Yes    [] Patient refuses  Recommended:     [] Consult to social work; patient has no insurance       [x] Script for glucometer and supplies (per preference of Pramod Gustavo)               [x] Script for outpatient diabetes education classes from PCP    [x] Carbohydrate-controlled diet    [x] Patient prefers/educator recommends insulin pens instead of syringes     (if insulin ordered for home use)    Thank you for this consult.

## 2018-09-21 NOTE — PROGRESS NOTES
[facility] Quality Flow/Interdisciplinary Rounds Progress Note        Quality Flow Rounds held on September 21, 2018    Disciplines Attending:  Bedside Nurse, Charge nurse, HOA, OT, PT, , and . Baylee Cabello was admitted on 9/19/2018  3:02 PM    Anticipated Discharge Date:  Expected Discharge Date: 09/30/18    Disposition:    Eulogio Score:  Eulogio Scale Score: 19    Readmission Risk              Risk of Unplanned Readmission:        14             Discussed patient goal for the day, patient clinical progression, and barriers to discharge.   The following Goal(s) of the Day/Commitment(s) have been identified:  Possible transfer if labs stables        Mari Juan  September 21, 2018

## 2018-09-22 LAB
ALBUMIN SERPL-MCNC: 3.8 G/DL (ref 3.5–5.2)
ALP BLD-CCNC: 86 U/L (ref 40–129)
ALT SERPL-CCNC: 17 U/L (ref 0–40)
ANION GAP SERPL CALCULATED.3IONS-SCNC: 10 MMOL/L (ref 7–16)
ANION GAP SERPL CALCULATED.3IONS-SCNC: 11 MMOL/L (ref 7–16)
ANION GAP SERPL CALCULATED.3IONS-SCNC: 19 MMOL/L (ref 7–16)
AST SERPL-CCNC: 14 U/L (ref 0–39)
BASOPHILS ABSOLUTE: 0.02 E9/L (ref 0–0.2)
BASOPHILS RELATIVE PERCENT: 0.3 % (ref 0–2)
BILIRUB SERPL-MCNC: 1.5 MG/DL (ref 0–1.2)
BILIRUBIN DIRECT: 0.3 MG/DL (ref 0–0.3)
BILIRUBIN, INDIRECT: 1.2 MG/DL (ref 0–1)
BUN BLDV-MCNC: 32 MG/DL (ref 6–20)
BUN BLDV-MCNC: 32 MG/DL (ref 6–20)
BUN BLDV-MCNC: 33 MG/DL (ref 6–20)
CALCIUM SERPL-MCNC: 8.4 MG/DL (ref 8.6–10.2)
CALCIUM SERPL-MCNC: 8.4 MG/DL (ref 8.6–10.2)
CALCIUM SERPL-MCNC: 8.5 MG/DL (ref 8.6–10.2)
CHLORIDE BLD-SCNC: 100 MMOL/L (ref 98–107)
CHLORIDE BLD-SCNC: 101 MMOL/L (ref 98–107)
CHLORIDE BLD-SCNC: 104 MMOL/L (ref 98–107)
CO2: 18 MMOL/L (ref 22–29)
CO2: 22 MMOL/L (ref 22–29)
CO2: 23 MMOL/L (ref 22–29)
CREAT SERPL-MCNC: 2.8 MG/DL (ref 0.7–1.2)
CREAT SERPL-MCNC: 2.8 MG/DL (ref 0.7–1.2)
CREAT SERPL-MCNC: 2.9 MG/DL (ref 0.7–1.2)
EOSINOPHILS ABSOLUTE: 0.31 E9/L (ref 0.05–0.5)
EOSINOPHILS RELATIVE PERCENT: 5.3 % (ref 0–6)
GFR AFRICAN AMERICAN: 30
GFR AFRICAN AMERICAN: 32
GFR AFRICAN AMERICAN: 32
GFR NON-AFRICAN AMERICAN: 25 ML/MIN/1.73
GFR NON-AFRICAN AMERICAN: 26 ML/MIN/1.73
GFR NON-AFRICAN AMERICAN: 26 ML/MIN/1.73
GLUCOSE BLD-MCNC: 185 MG/DL (ref 74–109)
GLUCOSE BLD-MCNC: 306 MG/DL (ref 74–109)
GLUCOSE BLD-MCNC: 394 MG/DL (ref 74–109)
HCT VFR BLD CALC: 37.8 % (ref 37–54)
HEMOGLOBIN: 13.1 G/DL (ref 12.5–16.5)
IMMATURE GRANULOCYTES #: 0.02 E9/L
IMMATURE GRANULOCYTES %: 0.3 % (ref 0–5)
LYMPHOCYTES ABSOLUTE: 1.92 E9/L (ref 1.5–4)
LYMPHOCYTES RELATIVE PERCENT: 32.9 % (ref 20–42)
MAGNESIUM: 1.9 MG/DL (ref 1.6–2.6)
MAGNESIUM: 2.3 MG/DL (ref 1.6–2.6)
MAGNESIUM: 2.3 MG/DL (ref 1.6–2.6)
MCH RBC QN AUTO: 30.6 PG (ref 26–35)
MCHC RBC AUTO-ENTMCNC: 34.7 % (ref 32–34.5)
MCV RBC AUTO: 88.3 FL (ref 80–99.9)
METER GLUCOSE: 203 MG/DL (ref 70–110)
METER GLUCOSE: 209 MG/DL (ref 70–110)
METER GLUCOSE: 256 MG/DL (ref 70–110)
METER GLUCOSE: 304 MG/DL (ref 70–110)
METER GLUCOSE: 336 MG/DL (ref 70–110)
METER GLUCOSE: 374 MG/DL (ref 70–110)
METER GLUCOSE: 97 MG/DL (ref 70–110)
MONOCYTES ABSOLUTE: 0.37 E9/L (ref 0.1–0.95)
MONOCYTES RELATIVE PERCENT: 6.3 % (ref 2–12)
NEUTROPHILS ABSOLUTE: 3.2 E9/L (ref 1.8–7.3)
NEUTROPHILS RELATIVE PERCENT: 54.9 % (ref 43–80)
PDW BLD-RTO: 12.6 FL (ref 11.5–15)
PHOSPHORUS: 2.9 MG/DL (ref 2.5–4.5)
PHOSPHORUS: 3.1 MG/DL (ref 2.5–4.5)
PHOSPHORUS: 3.4 MG/DL (ref 2.5–4.5)
PLATELET # BLD: 134 E9/L (ref 130–450)
PMV BLD AUTO: 12.7 FL (ref 7–12)
POTASSIUM SERPL-SCNC: 3.9 MMOL/L (ref 3.5–5)
POTASSIUM SERPL-SCNC: 4.4 MMOL/L (ref 3.5–5)
POTASSIUM SERPL-SCNC: 4.5 MMOL/L (ref 3.5–5)
RBC # BLD: 4.28 E12/L (ref 3.8–5.8)
SODIUM BLD-SCNC: 135 MMOL/L (ref 132–146)
SODIUM BLD-SCNC: 136 MMOL/L (ref 132–146)
SODIUM BLD-SCNC: 137 MMOL/L (ref 132–146)
TOTAL PROTEIN: 6.6 G/DL (ref 6.4–8.3)
WBC # BLD: 5.8 E9/L (ref 4.5–11.5)

## 2018-09-22 PROCEDURE — 80076 HEPATIC FUNCTION PANEL: CPT

## 2018-09-22 PROCEDURE — 6360000002 HC RX W HCPCS: Performed by: INTERNAL MEDICINE

## 2018-09-22 PROCEDURE — 2060000000 HC ICU INTERMEDIATE R&B

## 2018-09-22 PROCEDURE — 83735 ASSAY OF MAGNESIUM: CPT

## 2018-09-22 PROCEDURE — 87040 BLOOD CULTURE FOR BACTERIA: CPT

## 2018-09-22 PROCEDURE — 36592 COLLECT BLOOD FROM PICC: CPT

## 2018-09-22 PROCEDURE — 87081 CULTURE SCREEN ONLY: CPT

## 2018-09-22 PROCEDURE — 6370000000 HC RX 637 (ALT 250 FOR IP): Performed by: INTERNAL MEDICINE

## 2018-09-22 PROCEDURE — 99232 SBSQ HOSP IP/OBS MODERATE 35: CPT | Performed by: INTERNAL MEDICINE

## 2018-09-22 PROCEDURE — 36415 COLL VENOUS BLD VENIPUNCTURE: CPT

## 2018-09-22 PROCEDURE — 80048 BASIC METABOLIC PNL TOTAL CA: CPT

## 2018-09-22 PROCEDURE — 84100 ASSAY OF PHOSPHORUS: CPT

## 2018-09-22 PROCEDURE — 2580000003 HC RX 258: Performed by: INTERNAL MEDICINE

## 2018-09-22 PROCEDURE — 85025 COMPLETE CBC W/AUTO DIFF WBC: CPT

## 2018-09-22 PROCEDURE — 82962 GLUCOSE BLOOD TEST: CPT

## 2018-09-22 RX ORDER — METOPROLOL SUCCINATE 100 MG/1
100 TABLET, EXTENDED RELEASE ORAL DAILY
Status: DISCONTINUED | OUTPATIENT
Start: 2018-09-22 | End: 2018-09-23 | Stop reason: HOSPADM

## 2018-09-22 RX ORDER — INSULIN GLARGINE 100 [IU]/ML
35 INJECTION, SOLUTION SUBCUTANEOUS NIGHTLY
Status: DISCONTINUED | OUTPATIENT
Start: 2018-09-22 | End: 2018-09-23 | Stop reason: HOSPADM

## 2018-09-22 RX ORDER — SODIUM BICARBONATE 650 MG/1
1300 TABLET ORAL 3 TIMES DAILY
Status: DISCONTINUED | OUTPATIENT
Start: 2018-09-22 | End: 2018-09-23 | Stop reason: HOSPADM

## 2018-09-22 RX ADMIN — MINOXIDIL 10 MG: 2.5 TABLET ORAL at 21:31

## 2018-09-22 RX ADMIN — INSULIN LISPRO 12 UNITS: 100 INJECTION, SOLUTION INTRAVENOUS; SUBCUTANEOUS at 11:44

## 2018-09-22 RX ADMIN — INSULIN LISPRO 15 UNITS: 100 INJECTION, SOLUTION INTRAVENOUS; SUBCUTANEOUS at 15:09

## 2018-09-22 RX ADMIN — MINOXIDIL 10 MG: 2.5 TABLET ORAL at 16:06

## 2018-09-22 RX ADMIN — SODIUM BICARBONATE 1300 MG: 650 TABLET ORAL at 20:01

## 2018-09-22 RX ADMIN — INSULIN LISPRO 6 UNITS: 100 INJECTION, SOLUTION INTRAVENOUS; SUBCUTANEOUS at 23:24

## 2018-09-22 RX ADMIN — MINOXIDIL 10 MG: 2.5 TABLET ORAL at 08:38

## 2018-09-22 RX ADMIN — HEPARIN SODIUM 5000 UNITS: 10000 INJECTION INTRAVENOUS; SUBCUTANEOUS at 06:48

## 2018-09-22 RX ADMIN — HEPARIN SODIUM 5000 UNITS: 10000 INJECTION INTRAVENOUS; SUBCUTANEOUS at 20:02

## 2018-09-22 RX ADMIN — METOPROLOL SUCCINATE 100 MG: 100 TABLET, FILM COATED, EXTENDED RELEASE ORAL at 09:12

## 2018-09-22 RX ADMIN — SODIUM BICARBONATE 1300 MG: 650 TABLET ORAL at 12:15

## 2018-09-22 RX ADMIN — INSULIN GLARGINE 35 UNITS: 100 INJECTION, SOLUTION SUBCUTANEOUS at 20:09

## 2018-09-22 RX ADMIN — HEPARIN SODIUM 5000 UNITS: 10000 INJECTION INTRAVENOUS; SUBCUTANEOUS at 15:10

## 2018-09-22 RX ADMIN — INSULIN LISPRO 2 UNITS: 100 INJECTION, SOLUTION INTRAVENOUS; SUBCUTANEOUS at 08:38

## 2018-09-22 RX ADMIN — Medication 10 ML: at 20:03

## 2018-09-22 RX ADMIN — AMLODIPINE BESYLATE 10 MG: 10 TABLET ORAL at 08:38

## 2018-09-22 RX ADMIN — INSULIN LISPRO 8 UNITS: 100 INJECTION, SOLUTION INTRAVENOUS; SUBCUTANEOUS at 10:04

## 2018-09-22 RX ADMIN — ATORVASTATIN CALCIUM 20 MG: 20 TABLET, FILM COATED ORAL at 08:38

## 2018-09-22 RX ADMIN — INSULIN LISPRO 9 UNITS: 100 INJECTION, SOLUTION INTRAVENOUS; SUBCUTANEOUS at 20:09

## 2018-09-22 RX ADMIN — SODIUM BICARBONATE 1300 MG: 650 TABLET ORAL at 17:24

## 2018-09-22 ASSESSMENT — PAIN SCALES - GENERAL
PAINLEVEL_OUTOF10: 0

## 2018-09-22 NOTE — PROGRESS NOTES
dislocation. Soft tissues:  Unremarkable. Vasculature:  Unremarkable. No abdominal aortic aneurysm. Lymph nodes:  Unremarkable. No enlarged lymph nodes. 4 mm left renal calculus without obstruction. This report has been electronically signed by Amanda Tenorio MD.    Ct Head Wo Contrast    Result Date: 9/20/2018   FINDINGS:   Brain:  Unremarkable. No hemorrhage. No significant white matter disease. No edema. Ventricles:  Unremarkable. No hydrocephalus. Bones/joints:  Unremarkable. No acute fracture. Soft tissues:  Unremarkable. Sinuses:  Trace chronic paranasal sinus disease. Mastoid air cells:  Unremarkable as visualized. No mastoid effusion. No acute intracranial abnormality. This report has been electronically signed by Amanda Tenorio MD.        Resident's [address] [place] [address]   The patient is a 28 y. o. male  With PMH of HTN, CKD stage IV who presented to ED with AMS. Per patient he was feeling lethargic and feeling sick since Sunday. He was admitted to MICU for management of DKA and HHS and newly diagnosed DM. 1. Acute metabolic encephalopathy, resolved  2. DKA, HHS, AG closed yesterday for twice; however GAP was reopened, will bridged to Lantus 30 units nightly + high dose SSI q4hr  3. Newly diagnosed diabetes, HgA1C > 10  4. HAGMA, due to DKA  5. HANNY on CKD stage IV, Cr. Baseline about 2.5, now improving to 3.4  6. Hypertension, home med amlodipine, losartan, Minoxidil, furosemide, metoprolol  7.  HFpEF (55-60%), stage II DD    Plan  · monitor BMP, Mg, Phos every 4 hours  · Continue amlodipine and minoxidil, resumed home dose lopressor monitor BP  · Bridge to Lantus 30 unitis nightly + high dose SSI q4hr  · Follow up results of GAD65 and IA antibody for type of DM  · Nephrology follow for HANNY on CKD, hold off ACEI and furosemide, started bicarb 1300 mg TID with goal to titrate bicarb>22  · Continue carb control diet  · DVT prophylaxis: Lisa Farrar M.D., PGY 2  Internal Medicine Resident    Attending physician: Dr. Angela Radford Attending Addendum:    Patient seen and examined with the house staff. X-rays personally reviewed through the PACS. Family is updated at the bedside as available. Additional findings listed below as necessary. Additional comments:  1. Still with anion gap met acidosis but looks fine so continue present therapy. 2. HANNY on CKD improving. 3. Too floor will see as needed.

## 2018-09-22 NOTE — PLAN OF CARE
Problem: Sensory Perception - Impaired:  Goal: Ability to maintain a stable neurologic state will improve  Ability to maintain a stable neurologic state will improve   Outcome: Met This Shift

## 2018-09-22 NOTE — PROGRESS NOTES
[facility]  Internal Medicine Residency Program  Progress Note - House Team 1    Patient:  Joss Rinaldi 28 y.o. male MRN: [id number]     Date of Service: 9/22/2018     CC: AMS and DKA    Subjective     Patient was seen and examined at bedside. He continues to complain of blurry vision but understands that it is due to the elevated blood sugars. He denies any other symptoms this morning. He understands that he is being transferred out of the unit to a monitored floor today. Objective     Physical Exam:  · Vitals: BP (!) 159/94   Pulse 85   Temp 99.4 °F (37.4 °C)   Resp 19   Ht 5' 9\" (1.753 m)   Wt 242 lb (109.8 kg)   SpO2 98%   BMI 35.74 kg/m²     · I & O - 24hr: No intake/output data recorded. · General Appearance: alert, appears stated age and cooperative  · HEENT:  Head: Normocephalic, no lesions, without obvious abnormality. · Eye: Normal external eye, conjunctiva, lids, SHRUTHI, reduced peripheral and central vision, EOMI  · Ears: Normal TM's bilaterally. Normal auditory canals and external ears. Non-tender. · Nose: Normal external nose, mucus membranes and septum. · Pharynx: Dental Hygiene adequate. Normal buccal mucosa. Normal pharynx. · Neck: no adenopathy, no carotid bruit, no JVD, supple, symmetrical, trachea midline and thyroid not enlarged, symmetric, no tenderness/mass/nodules  · Lung: clear to auscultation bilaterally  · Heart: regular rate and rhythm, S1, S2 normal, no murmur, click, rub or gallop  · Abdomen: soft, non-tender; bowel sounds normal; no masses,  no organomegaly  · Extremities:  extremities normal, atraumatic, no cyanosis or edema  · Musculokeletal: No joint swelling, no muscle tenderness. ROM normal in all joints of extremities.    · Neurologic: Mental status: Alert, oriented, thought content appropriate  Subject  Pertinent Labs & Imaging Studies   torie  CBC with Differential:    Lab Results   Component Value Date    WBC 5.8 09/22/2018    RBC 4.28 09/22/2018    HGB 13.1 09/22/2018    HCT 37.8 09/22/2018     09/22/2018    MCV 88.3 09/22/2018    MCH 30.6 09/22/2018    MCHC 34.7 09/22/2018    RDW 12.6 09/22/2018    LYMPHOPCT 32.9 09/22/2018    MONOPCT 6.3 09/22/2018    BASOPCT 0.3 09/22/2018    MONOSABS 0.37 09/22/2018    LYMPHSABS 1.92 09/22/2018    EOSABS 0.31 09/22/2018    BASOSABS 0.02 09/22/2018     CMP:    Lab Results   Component Value Date     09/22/2018    K 3.9 09/22/2018     09/22/2018    CO2 18 09/22/2018    BUN 33 09/22/2018    CREATININE 2.8 09/22/2018    GFRAA 32 09/22/2018    LABGLOM 26 09/22/2018    GLUCOSE 185 09/22/2018    GLUCOSE 85 03/14/2011    PROT 6.6 09/22/2018    LABALBU 3.8 09/22/2018    LABALBU 4.4 03/14/2011    CALCIUM 8.4 09/22/2018    BILITOT 1.5 09/22/2018    ALKPHOS 86 09/22/2018    AST 14 09/22/2018    ALT 17 09/22/2018     Calcium:    Lab Results   Component Value Date    CALCIUM 8.4 09/22/2018     Magnesium:    Lab Results   Component Value Date    MG 1.9 09/22/2018     Phosphorus:    Lab Results   Component Value Date    PHOS 3.4 09/22/2018     ABG:  No results found for: PH, PCO2, PO2, HCO3, BE, THGB, TCO2, O2SAT  TSH:    Lab Results   Component Value Date    TSH 0.436 09/20/2018       Resident's Assessment and Plan     Diabetic Ketoacidosis - resolving  -Converted to SSI and Glargine 25 U  -Replace electrolytes as needed   -Methanol and Ethylene glycol levels pending   -Antibodies to evaluate for SAGAR   -A1c 10.5   -Lipid panel normal; troponin and EKG normal; BC + for Coagulase negative staph likely Staph Epi which is likely a contaminant in 1 bottle. Will continue to monitor, repeat BC and nares culture  -Zyprexa: case reports linking zyprexa to cataract formation and DKA are in the literature; one such report shows that DKA can occur months after initiation of Zyprexa and the chances of developing DKA increases with duration of Zyprexa use.       The ASCVD Risk score (Juventino Mims, et al., 2013) failed to calculate for the following reasons:     The 2013 ASCVD risk score is only valid for ages 36 to 78    Acute Encephalopathy - resolved  -likely 2/2 hyperglycemic state   -UDS negative; awaiting methanol and ethylene glycol levels 2/2 HANNY and vision problems   -Vision problems and acute encephalopathy likely 2/2 HHS and DKA   -CT head negative    HAGMA  -AG 19; likely 2/2 starvation and CKD   -Oral Bicarbonate ordered for goal bicarb of 22  -continue hydration     HTN  -Last Echo: EF 55-60%, stage II diastolic [id number](3/5360)  -on aspirin, amlodipine,lasix, toprol xl,losartan outpatient  -Anti-hypertensives held 2/2 normotension   -PRN labetalol   -Minoxidil and amlodipine ordered     HANNY on CKD IV -resolving   -continue hydration  -holding losartan and lasix   -Nephrology consulted input appreciated   -Cr is 2.8 today  -Baseline is 2.5    Secondary 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 Michelle aFjardo

## 2018-09-22 NOTE — PROGRESS NOTES
[facility]  Nephrology Attending Progress Note          SUBJECTIVE: Pt being seen for HANNY on CKD G3    9/22: Pt awake alert and denies any pain or SOB and states he is hungry      PROBLEM LIST:    Patient Active Problem List   Diagnosis    HTN (hypertension)    Atypical chest pain    Hypertensive emergency    [facility] (acute [facility]) ([facility] [address]    LVH (left ventricular hypertrophy)    Tobacco abuse    ETOH abuse    Noncompliance with medication regimen    Diabetic acidosis without coma ([facility] [address]        PAST MEDICAL HISTORY:    Past Medical History:   Diagnosis Date    Chest pain     HTN (hypertension)     Hydrocele, left 10/28/2010       DIET:    DIET CARB CONTROL; Carb Control: 4 carbs/meal (approximate 1800 kcals/day);  No Added Salt (3-4 GM)     PHYSICAL EXAM:     Patient Vitals for the past 24 hrs:   BP Temp Temp src Pulse Resp SpO2 Weight   09/22/18 0800 - - - 85 19 - -   09/22/18 0700 - - - 80 21 - -   09/22/18 0600 - - - 88 26 - -   09/22/18 0500 - - - 93 22 - -   09/22/18 0445 - 99.4 °F (37.4 °C) - - - - -   09/22/18 0400 (!) 159/94 - - 79 17 - -   09/22/18 0300 (!) 155/97 - - 85 26 - -   09/22/18 0200 (!) 166/89 - - 89 15 - -   09/22/18 0100 (!) 154/80 - - 78 24 - -   09/22/18 0030 - 98.9 °F (37.2 °C) Temporal 87 20 98 % -   09/22/18 0000 (!) 162/94 - - 79 25 - -   09/21/18 2300 (!) 159/96 - - 79 24 - -   09/21/18 2200 (!) 175/108 - - 85 24 - -   09/21/18 2100 (!) 167/103 - - 75 21 - -   09/21/18 2000 - 97.8 °F (36.6 °C) - 83 23 - -   09/21/18 1900 (!) 152/94 - - 81 17 - -   09/21/18 1800 (!) 145/103 - - 78 23 - -   09/21/18 1700 (!) 180/113 - - 85 19 - -   09/21/18 1600 (!) 165/92 - - 87 23 - -   09/21/18 1500 132/71 - - 84 21 - 242 lb (109.8 kg)   09/21/18 1400 (!) 157/95 - - 92 18 - -   09/21/18 1300 135/79 - - 79 19 98 % -   09/21/18 1200 128/72 97.8 °F (36.6 °C) - 80 20 97 % -   09/21/18 1100 (!) 168/88 - - 77 19 97 % -   09/21/18 1000 (!) 159/102 - - 90 17 98 % -   @      Intake/Output

## 2018-09-23 VITALS
RESPIRATION RATE: 16 BRPM | BODY MASS INDEX: 35.53 KG/M2 | TEMPERATURE: 98.2 F | HEIGHT: 69 IN | DIASTOLIC BLOOD PRESSURE: 55 MMHG | HEART RATE: 83 BPM | OXYGEN SATURATION: 99 % | SYSTOLIC BLOOD PRESSURE: 128 MMHG | WEIGHT: 239.9 LBS

## 2018-09-23 LAB
ALBUMIN SERPL-MCNC: 3.8 G/DL (ref 3.5–5.2)
ALP BLD-CCNC: 79 U/L (ref 40–129)
ALT SERPL-CCNC: 16 U/L (ref 0–40)
ANION GAP SERPL CALCULATED.3IONS-SCNC: 15 MMOL/L (ref 7–16)
AST SERPL-CCNC: 13 U/L (ref 0–39)
BASOPHILS ABSOLUTE: 0.02 E9/L (ref 0–0.2)
BASOPHILS RELATIVE PERCENT: 0.5 % (ref 0–2)
BILIRUB SERPL-MCNC: 0.9 MG/DL (ref 0–1.2)
BILIRUBIN DIRECT: 0.2 MG/DL (ref 0–0.3)
BILIRUBIN, INDIRECT: 0.7 MG/DL (ref 0–1)
BUN BLDV-MCNC: 26 MG/DL (ref 6–20)
CALCIUM SERPL-MCNC: 8.4 MG/DL (ref 8.6–10.2)
CHLORIDE BLD-SCNC: 102 MMOL/L (ref 98–107)
CO2: 22 MMOL/L (ref 22–29)
CREAT SERPL-MCNC: 2.4 MG/DL (ref 0.7–1.2)
EOSINOPHILS ABSOLUTE: 0.35 E9/L (ref 0.05–0.5)
EOSINOPHILS RELATIVE PERCENT: 7.9 % (ref 0–6)
GFR AFRICAN AMERICAN: 38
GFR NON-AFRICAN AMERICAN: 31 ML/MIN/1.73
GLUCOSE BLD-MCNC: 90 MG/DL (ref 74–109)
GLUTAMIC ACID DECARB AB: >250 IU/ML (ref 0–5)
HCT VFR BLD CALC: 36.7 % (ref 37–54)
HEMOGLOBIN: 12.4 G/DL (ref 12.5–16.5)
IMMATURE GRANULOCYTES #: 0.01 E9/L
IMMATURE GRANULOCYTES %: 0.2 % (ref 0–5)
LYMPHOCYTES ABSOLUTE: 1.63 E9/L (ref 1.5–4)
LYMPHOCYTES RELATIVE PERCENT: 36.9 % (ref 20–42)
MAGNESIUM: 2.2 MG/DL (ref 1.6–2.6)
MCH RBC QN AUTO: 29.6 PG (ref 26–35)
MCHC RBC AUTO-ENTMCNC: 33.8 % (ref 32–34.5)
MCV RBC AUTO: 87.6 FL (ref 80–99.9)
METER GLUCOSE: 256 MG/DL (ref 70–110)
METER GLUCOSE: 82 MG/DL (ref 70–110)
METER GLUCOSE: 92 MG/DL (ref 70–110)
MONOCYTES ABSOLUTE: 0.34 E9/L (ref 0.1–0.95)
MONOCYTES RELATIVE PERCENT: 7.7 % (ref 2–12)
MRSA CULTURE ONLY: NORMAL
NEUTROPHILS ABSOLUTE: 2.07 E9/L (ref 1.8–7.3)
NEUTROPHILS RELATIVE PERCENT: 46.8 % (ref 43–80)
PDW BLD-RTO: 12.3 FL (ref 11.5–15)
PHOSPHORUS: 3 MG/DL (ref 2.5–4.5)
PLATELET # BLD: 118 E9/L (ref 130–450)
PMV BLD AUTO: 12 FL (ref 7–12)
POTASSIUM SERPL-SCNC: 3.5 MMOL/L (ref 3.5–5)
RBC # BLD: 4.19 E12/L (ref 3.8–5.8)
SODIUM BLD-SCNC: 139 MMOL/L (ref 132–146)
TOTAL PROTEIN: 6.4 G/DL (ref 6.4–8.3)
WBC # BLD: 4.4 E9/L (ref 4.5–11.5)

## 2018-09-23 PROCEDURE — 84100 ASSAY OF PHOSPHORUS: CPT

## 2018-09-23 PROCEDURE — 36415 COLL VENOUS BLD VENIPUNCTURE: CPT

## 2018-09-23 PROCEDURE — 6360000002 HC RX W HCPCS: Performed by: INTERNAL MEDICINE

## 2018-09-23 PROCEDURE — 6370000000 HC RX 637 (ALT 250 FOR IP): Performed by: INTERNAL MEDICINE

## 2018-09-23 PROCEDURE — 80076 HEPATIC FUNCTION PANEL: CPT

## 2018-09-23 PROCEDURE — 2580000003 HC RX 258: Performed by: INTERNAL MEDICINE

## 2018-09-23 PROCEDURE — 82962 GLUCOSE BLOOD TEST: CPT

## 2018-09-23 PROCEDURE — 83735 ASSAY OF MAGNESIUM: CPT

## 2018-09-23 PROCEDURE — 85025 COMPLETE CBC W/AUTO DIFF WBC: CPT

## 2018-09-23 PROCEDURE — 80048 BASIC METABOLIC PNL TOTAL CA: CPT

## 2018-09-23 PROCEDURE — 99238 HOSP IP/OBS DSCHRG MGMT 30/<: CPT | Performed by: INTERNAL MEDICINE

## 2018-09-23 PROCEDURE — 36592 COLLECT BLOOD FROM PICC: CPT

## 2018-09-23 RX ORDER — BLOOD-GLUCOSE METER
KIT MISCELLANEOUS
Qty: 1 KIT | Refills: 0 | Status: SHIPPED | OUTPATIENT
Start: 2018-09-23 | End: 2021-03-11 | Stop reason: SDUPTHER

## 2018-09-23 RX ORDER — INSULIN GLARGINE 100 [IU]/ML
35 INJECTION, SOLUTION SUBCUTANEOUS NIGHTLY
Qty: 1 VIAL | Refills: 3 | Status: SHIPPED | OUTPATIENT
Start: 2018-09-23 | End: 2018-11-07 | Stop reason: SDUPTHER

## 2018-09-23 RX ORDER — ERGOCALCIFEROL (VITAMIN D2) 1250 MCG
50000 CAPSULE ORAL WEEKLY
Qty: 7 CAPSULE | Refills: 0 | Status: SHIPPED | OUTPATIENT
Start: 2018-09-23 | End: 2018-12-07 | Stop reason: CLARIF

## 2018-09-23 RX ORDER — SODIUM BICARBONATE 650 MG/1
650 TABLET ORAL 3 TIMES DAILY
Qty: 90 TABLET | Refills: 1 | Status: ON HOLD | OUTPATIENT
Start: 2018-09-23 | End: 2022-08-02 | Stop reason: SDUPTHER

## 2018-09-23 RX ADMIN — INSULIN LISPRO 9 UNITS: 100 INJECTION, SOLUTION INTRAVENOUS; SUBCUTANEOUS at 11:17

## 2018-09-23 RX ADMIN — ATORVASTATIN CALCIUM 20 MG: 20 TABLET, FILM COATED ORAL at 08:48

## 2018-09-23 RX ADMIN — MINOXIDIL 10 MG: 2.5 TABLET ORAL at 08:48

## 2018-09-23 RX ADMIN — METOPROLOL SUCCINATE 100 MG: 100 TABLET, FILM COATED, EXTENDED RELEASE ORAL at 08:48

## 2018-09-23 RX ADMIN — AMLODIPINE BESYLATE 10 MG: 10 TABLET ORAL at 08:48

## 2018-09-23 RX ADMIN — Medication 10 ML: at 08:49

## 2018-09-23 RX ADMIN — HEPARIN SODIUM 5000 UNITS: 10000 INJECTION INTRAVENOUS; SUBCUTANEOUS at 07:06

## 2018-09-23 RX ADMIN — SODIUM BICARBONATE 1300 MG: 650 TABLET ORAL at 08:48

## 2018-09-23 ASSESSMENT — PAIN SCALES - GENERAL: PAINLEVEL_OUTOF10: 0

## 2018-09-23 NOTE — PROGRESS NOTES
Patient educated on glucose monitoring,insulin differences such as short and long acting insulin, and sliding scale insulin dosing. Patient used lancet to prick his own finger and checked his own blood sugar. Patient informed of insulin dosage and given syringe and humalog. Patient successfully jaelyn up insulin dosage and self administered insulin subcutaneously. Will continue to educate patient and allow patient to self administer insulin. Patient very eager and willing to learn.

## 2018-09-23 NOTE — PROGRESS NOTES
severe dehydration and dka  Holding outpt acei and lasix  Peak cr 5.1  Cr improved from 2.8mg/dl-->2.4mg/dl     2. Anion Gap metabolic acidosis  In setting of dka and the HANNY on CKD-HCO3 improved to 22 which is target and the AG closing  Continue oral bicarbonate 650mg 2 pills po tid to cover for the inabilty for reclamation of bicarb and decreased ability of  urinary H+ ion excrertion in the setting of the decreased renal function and DM2     3. Hyperkalemia  Resolved  Holding acei     4 hyponatremia  Pseudo in setting of hyperglycemia  resolved     5. dka  Improved anion gap in the setting of the improving cr-BC with CONS question contaminant    6. HTN with CKD I-IV  BP above goal <130/80  BP improved  with the resumption of the  BB -  the last 2D ECHO with stage II DD as well as severe concentric LVH  Continue to hold the ACEI until the cr plateaus    7.  Pancytopenia-PLT and WBC as well as HgB trending down        Jair Boykin MD

## 2018-09-23 NOTE — PROGRESS NOTES
[facility]    Attending Physician Statement  I have discussed the case, including pertinent history (medical, surgical, family and social) and exam findings with the resident and the team.  I have seen and examined the patient and the key elements of the encounter have been performed by me. I agree with the assessment, plan and orders as documented by the resident. The patient was seen earlier by me on rounds with the team.    He is doing much better - out of ICU - ok to send home today. He will continue hospital BS meds and reassess at PCP office in a few days. I have reviewed my findings and recommendations with Selwyn Yepez. Remainder of medical problems as per resident note.       Bird Younger M.D., [address]  Internal Medicine Residency Faculty

## 2018-09-23 NOTE — DISCHARGE SUMMARY
no organomegaly  · Extremities:  extremities normal, atraumatic, no cyanosis or edema  · Musculokeletal: No joint swelling, no muscle tenderness. ROM normal in all joints of extremities. · Neurologic: Mental status: Alert, oriented, thought content appropriate    Pending test results: Antibodies for SAGAR, Blood Cultures     Consults:  1. Nephrology  2. Ophthalmology    Procedures:  1.  None    Condition at discharge: Stable    Disposition: home    Discharge Medications:  Current Discharge Medication List      CONTINUE these medications which have NOT CHANGED    Details   losartan (COZAAR) 100 MG tablet Take 100 mg by mouth daily      potassium chloride (KLOR-CON) 20 MEQ packet Take 20 mEq by mouth 2 times daily      minoxidil (LONITEN) 10 MG tablet Take 1 tablet by mouth 3 times daily  Qty: 90 tablet, Refills: 1    Associated Diagnoses: Essential hypertension      amLODIPine (NORVASC) 10 MG tablet Take 1 tablet by mouth daily  Qty: 30 tablet, Refills: 1    Associated Diagnoses: Essential hypertension      aspirin 81 MG tablet Take 1 tablet by mouth daily  Qty: 30 tablet, Refills: 1    Associated Diagnoses: Essential hypertension      furosemide (LASIX) 20 MG tablet Take 0.5 tablets by mouth daily  Qty: 15 tablet, Refills: 1    Associated Diagnoses: Essential hypertension      metoprolol succinate (TOPROL XL) 100 MG extended release tablet Take 1 tablet by mouth daily  Qty: 30 tablet, Refills: 1    Associated Diagnoses: Essential hypertension      atorvastatin (LIPITOR) 20 MG tablet Take 1 tablet by mouth daily  Qty: 30 tablet, Refills: 1    Associated Diagnoses: Hyperlipidemia, unspecified hyperlipidemia type      Blood Pressure Monitoring (ADULT BLOOD PRESSURE CUFF LG) KIT 1 kit by Does not apply route daily  Qty: 1 kit, Refills: 0         STOP taking these medications       lisinopril (PRINIVIL;ZESTRIL) 10 MG tablet Comments:   Reason for Stopping:               Activity: activity as tolerated  Diet: diabetic

## 2018-09-23 NOTE — PLAN OF CARE
Problem: Serum Glucose Level - Abnormal:  Goal: Ability to maintain appropriate glucose levels will improve  Ability to maintain appropriate glucose levels will improve   Outcome: Met This Shift      Problem: Nutrition  Goal: Understanding of nutritional guidelines  Outcome: Met This Shift

## 2018-09-24 LAB — CULTURE, BLOOD 2: NORMAL

## 2018-09-25 ENCOUNTER — TELEPHONE (OUTPATIENT)
Dept: INTERNAL MEDICINE | Age: 33
End: 2018-09-25

## 2018-09-25 LAB
BLOOD CULTURE, ROUTINE: ABNORMAL
BLOOD CULTURE, ROUTINE: ABNORMAL
ORGANISM: ABNORMAL

## 2018-09-27 LAB
BLOOD CULTURE, ROUTINE: NORMAL
CULTURE, BLOOD 2: NORMAL

## 2018-09-27 NOTE — TELEPHONE ENCOUNTER
Pt did not keep follow up appointment. Voicemail message left for patient to call office. Need to follow up on BS readings. If still high patient can be seen this afternoon in office.

## 2018-09-29 LAB
Lab: NORMAL
REPORT: NORMAL
THIS TEST SENT TO: NORMAL

## 2018-10-03 ENCOUNTER — OFFICE VISIT (OUTPATIENT)
Dept: INTERNAL MEDICINE | Age: 33
End: 2018-10-03
Payer: COMMERCIAL

## 2018-10-03 VITALS
BODY MASS INDEX: 37.92 KG/M2 | RESPIRATION RATE: 14 BRPM | HEIGHT: 69 IN | DIASTOLIC BLOOD PRESSURE: 54 MMHG | TEMPERATURE: 98.3 F | SYSTOLIC BLOOD PRESSURE: 105 MMHG | WEIGHT: 256 LBS | HEART RATE: 71 BPM

## 2018-10-03 DIAGNOSIS — E21.1 SECONDARY HYPERPARATHYROIDISM, NOT ELSEWHERE CLASSIFIED (HCC): ICD-10-CM

## 2018-10-03 DIAGNOSIS — E10.11 TYPE 1 DIABETES MELLITUS WITH KETOACIDOTIC COMA (HCC): ICD-10-CM

## 2018-10-03 DIAGNOSIS — E55.9 VITAMIN D DEFICIENCY: ICD-10-CM

## 2018-10-03 DIAGNOSIS — E79.0 HYPERURICEMIA: Primary | ICD-10-CM

## 2018-10-03 DIAGNOSIS — N18.30 CKD (CHRONIC KIDNEY DISEASE) STAGE 3, GFR 30-59 ML/MIN (HCC): ICD-10-CM

## 2018-10-03 DIAGNOSIS — K90.1 SPRUE: ICD-10-CM

## 2018-10-03 PROCEDURE — 99496 TRANSJ CARE MGMT HIGH F2F 7D: CPT | Performed by: INTERNAL MEDICINE

## 2018-10-03 RX ORDER — AMLODIPINE BESYLATE 10 MG/1
TABLET ORAL
COMMUNITY
Start: 2013-09-06 | End: 2018-11-07 | Stop reason: SDUPTHER

## 2018-10-03 RX ORDER — ATORVASTATIN CALCIUM 40 MG/1
TABLET, FILM COATED ORAL
Refills: 5 | COMMUNITY
Start: 2018-09-12 | End: 2018-10-03

## 2018-10-03 RX ORDER — LISINOPRIL AND HYDROCHLOROTHIAZIDE 25; 20 MG/1; MG/1
TABLET ORAL
COMMUNITY
Start: 2013-09-06 | End: 2018-10-03

## 2018-10-03 RX ORDER — DEXTROAMPHETAMINE SACCHARATE, AMPHETAMINE ASPARTATE, DEXTROAMPHETAMINE SULFATE AND AMPHETAMINE SULFATE 1.25; 1.25; 1.25; 1.25 MG/1; MG/1; MG/1; MG/1
TABLET ORAL
Refills: 0 | COMMUNITY
Start: 2018-09-12

## 2018-10-03 RX ORDER — METOPROLOL SUCCINATE 200 MG/1
TABLET, EXTENDED RELEASE ORAL
Refills: 5 | COMMUNITY
Start: 2018-09-12 | End: 2018-11-07 | Stop reason: SDUPTHER

## 2018-10-03 RX ORDER — LISINOPRIL AND HYDROCHLOROTHIAZIDE 20; 12.5 MG/1; MG/1
TABLET ORAL
COMMUNITY
Start: 2013-09-06 | End: 2018-10-03

## 2018-10-03 RX ORDER — ERGOCALCIFEROL (VITAMIN D2) 1250 MCG
CAPSULE ORAL
Qty: 8 CAPSULE | Refills: 3 | Status: SHIPPED | OUTPATIENT
Start: 2018-10-03 | End: 2018-11-07 | Stop reason: SDUPTHER

## 2018-10-03 RX ORDER — METOPROLOL TARTRATE 50 MG/1
TABLET, FILM COATED ORAL
COMMUNITY
Start: 2013-09-06 | End: 2018-10-03

## 2018-11-07 ENCOUNTER — HOSPITAL ENCOUNTER (OUTPATIENT)
Age: 33
Discharge: HOME | End: 2018-11-07
Payer: COMMERCIAL

## 2018-11-07 ENCOUNTER — OFFICE VISIT (OUTPATIENT)
Dept: INTERNAL MEDICINE | Age: 33
End: 2018-11-07
Payer: COMMERCIAL

## 2018-11-07 VITALS
SYSTOLIC BLOOD PRESSURE: 128 MMHG | HEIGHT: 70 IN | HEART RATE: 85 BPM | DIASTOLIC BLOOD PRESSURE: 75 MMHG | TEMPERATURE: 98.1 F | RESPIRATION RATE: 14 BRPM | BODY MASS INDEX: 37.37 KG/M2 | WEIGHT: 261 LBS

## 2018-11-07 DIAGNOSIS — N18.30 CKD (CHRONIC KIDNEY DISEASE) STAGE 3, GFR 30-59 ML/MIN (HCC): ICD-10-CM

## 2018-11-07 DIAGNOSIS — E10.8 TYPE 1 DIABETES MELLITUS WITH COMPLICATION (HCC): ICD-10-CM

## 2018-11-07 DIAGNOSIS — N04.9 NEPHROTIC SYNDROME: ICD-10-CM

## 2018-11-07 DIAGNOSIS — K90.1 SPRUE: ICD-10-CM

## 2018-11-07 DIAGNOSIS — E78.5 HYPERLIPIDEMIA, UNSPECIFIED HYPERLIPIDEMIA TYPE: ICD-10-CM

## 2018-11-07 DIAGNOSIS — Z91.14 NONCOMPLIANCE WITH MEDICATION REGIMEN: ICD-10-CM

## 2018-11-07 DIAGNOSIS — I10 ESSENTIAL HYPERTENSION: ICD-10-CM

## 2018-11-07 DIAGNOSIS — E21.1 SECONDARY HYPERPARATHYROIDISM, NOT ELSEWHERE CLASSIFIED (HCC): ICD-10-CM

## 2018-11-07 DIAGNOSIS — K64.9 HEMORRHOIDS, UNSPECIFIED HEMORRHOID TYPE: ICD-10-CM

## 2018-11-07 DIAGNOSIS — E10.8 TYPE 1 DIABETES MELLITUS WITH COMPLICATION (HCC): Primary | ICD-10-CM

## 2018-11-07 DIAGNOSIS — E10.11 TYPE 1 DIABETES MELLITUS WITH KETOACIDOTIC COMA (HCC): ICD-10-CM

## 2018-11-07 DIAGNOSIS — E55.9 VITAMIN D DEFICIENCY: ICD-10-CM

## 2018-11-07 DIAGNOSIS — E79.0 HYPERURICEMIA: ICD-10-CM

## 2018-11-07 LAB
ALBUMIN SERPL-MCNC: 4.7 G/DL (ref 3.5–5.2)
ALP BLD-CCNC: 72 U/L (ref 40–129)
ALT SERPL-CCNC: 16 U/L (ref 0–40)
ANION GAP SERPL CALCULATED.3IONS-SCNC: 17 MMOL/L (ref 7–16)
AST SERPL-CCNC: 11 U/L (ref 0–39)
BASOPHILS ABSOLUTE: 0.03 E9/L (ref 0–0.2)
BASOPHILS RELATIVE PERCENT: 0.8 % (ref 0–2)
BILIRUB SERPL-MCNC: 1.1 MG/DL (ref 0–1.2)
BUN BLDV-MCNC: 26 MG/DL (ref 6–20)
CALCIUM SERPL-MCNC: 9.2 MG/DL (ref 8.6–10.2)
CHLORIDE BLD-SCNC: 106 MMOL/L (ref 98–107)
CO2: 21 MMOL/L (ref 22–29)
CREAT SERPL-MCNC: 2.6 MG/DL (ref 0.7–1.2)
EOSINOPHILS ABSOLUTE: 0.13 E9/L (ref 0.05–0.5)
EOSINOPHILS RELATIVE PERCENT: 3.4 % (ref 0–6)
GFR AFRICAN AMERICAN: 34
GFR NON-AFRICAN AMERICAN: 28 ML/MIN/1.73
GLUCOSE BLD-MCNC: 87 MG/DL (ref 74–99)
HBA1C MFR BLD: 10 % (ref 4–5.6)
HCT VFR BLD CALC: 42.9 % (ref 37–54)
HEMOGLOBIN: 13.7 G/DL (ref 12.5–16.5)
IMMATURE GRANULOCYTES #: 0.01 E9/L
IMMATURE GRANULOCYTES %: 0.3 % (ref 0–5)
LYMPHOCYTES ABSOLUTE: 1.42 E9/L (ref 1.5–4)
LYMPHOCYTES RELATIVE PERCENT: 37.3 % (ref 20–42)
MCH RBC QN AUTO: 29.2 PG (ref 26–35)
MCHC RBC AUTO-ENTMCNC: 31.9 % (ref 32–34.5)
MCV RBC AUTO: 91.5 FL (ref 80–99.9)
MONOCYTES ABSOLUTE: 0.45 E9/L (ref 0.1–0.95)
MONOCYTES RELATIVE PERCENT: 11.8 % (ref 2–12)
NEUTROPHILS ABSOLUTE: 1.77 E9/L (ref 1.8–7.3)
NEUTROPHILS RELATIVE PERCENT: 46.4 % (ref 43–80)
PARATHYROID HORMONE INTACT: 217 PG/ML (ref 15–65)
PDW BLD-RTO: 13.6 FL (ref 11.5–15)
PLATELET # BLD: 245 E9/L (ref 130–450)
PMV BLD AUTO: 11.4 FL (ref 7–12)
POTASSIUM SERPL-SCNC: 4.6 MMOL/L (ref 3.5–5)
RBC # BLD: 4.69 E12/L (ref 3.8–5.8)
SODIUM BLD-SCNC: 144 MMOL/L (ref 132–146)
TOTAL PROTEIN: 7.4 G/DL (ref 6.4–8.3)
URIC ACID, SERUM: 7.4 MG/DL (ref 3.4–7)
VITAMIN D 25-HYDROXY: 23 NG/ML (ref 30–100)
WBC # BLD: 3.8 E9/L (ref 4.5–11.5)

## 2018-11-07 PROCEDURE — G8484 FLU IMMUNIZE NO ADMIN: HCPCS | Performed by: INTERNAL MEDICINE

## 2018-11-07 PROCEDURE — 2022F DILAT RTA XM EVC RTNOPTHY: CPT | Performed by: INTERNAL MEDICINE

## 2018-11-07 PROCEDURE — G8427 DOCREV CUR MEDS BY ELIG CLIN: HCPCS | Performed by: INTERNAL MEDICINE

## 2018-11-07 PROCEDURE — 82306 VITAMIN D 25 HYDROXY: CPT

## 2018-11-07 PROCEDURE — G8417 CALC BMI ABV UP PARAM F/U: HCPCS | Performed by: INTERNAL MEDICINE

## 2018-11-07 PROCEDURE — 82652 VIT D 1 25-DIHYDROXY: CPT

## 2018-11-07 PROCEDURE — 85025 COMPLETE CBC W/AUTO DIFF WBC: CPT

## 2018-11-07 PROCEDURE — 80053 COMPREHEN METABOLIC PANEL: CPT

## 2018-11-07 PROCEDURE — 36415 COLL VENOUS BLD VENIPUNCTURE: CPT

## 2018-11-07 PROCEDURE — 96160 PT-FOCUSED HLTH RISK ASSMT: CPT | Performed by: INTERNAL MEDICINE

## 2018-11-07 PROCEDURE — 83970 ASSAY OF PARATHORMONE: CPT

## 2018-11-07 PROCEDURE — 1036F TOBACCO NON-USER: CPT | Performed by: INTERNAL MEDICINE

## 2018-11-07 PROCEDURE — 99214 OFFICE O/P EST MOD 30 MIN: CPT | Performed by: INTERNAL MEDICINE

## 2018-11-07 PROCEDURE — 99212 OFFICE O/P EST SF 10 MIN: CPT | Performed by: INTERNAL MEDICINE

## 2018-11-07 PROCEDURE — 83036 HEMOGLOBIN GLYCOSYLATED A1C: CPT

## 2018-11-07 PROCEDURE — 3046F HEMOGLOBIN A1C LEVEL >9.0%: CPT | Performed by: INTERNAL MEDICINE

## 2018-11-07 PROCEDURE — 84550 ASSAY OF BLOOD/URIC ACID: CPT

## 2018-11-07 PROCEDURE — 83516 IMMUNOASSAY NONANTIBODY: CPT

## 2018-11-07 RX ORDER — METOPROLOL SUCCINATE 200 MG/1
200 TABLET, EXTENDED RELEASE ORAL DAILY
Qty: 30 TABLET | Refills: 3 | Status: SHIPPED | OUTPATIENT
Start: 2018-11-07 | End: 2019-07-01 | Stop reason: SDUPTHER

## 2018-11-07 RX ORDER — TRAZODONE HYDROCHLORIDE 50 MG/1
TABLET ORAL
Refills: 2 | COMMUNITY
Start: 2018-10-23

## 2018-11-07 RX ORDER — FUROSEMIDE 20 MG/1
TABLET ORAL
Refills: 5 | COMMUNITY
Start: 2018-10-06 | End: 2018-11-08 | Stop reason: ALTCHOICE

## 2018-11-07 RX ORDER — SODIUM BICARBONATE 650 MG/1
650 TABLET ORAL 3 TIMES DAILY
Qty: 90 TABLET | Status: CANCELLED | OUTPATIENT
Start: 2018-11-07

## 2018-11-07 RX ORDER — MINOXIDIL 10 MG/1
TABLET ORAL
Refills: 5 | COMMUNITY
Start: 2018-10-08 | End: 2018-11-07 | Stop reason: SDUPTHER

## 2018-11-07 RX ORDER — DIAPER,BRIEF,INFANT-TODD,DISP
EACH MISCELLANEOUS
Qty: 1 TUBE | Refills: 1 | Status: SHIPPED | OUTPATIENT
Start: 2018-11-07 | End: 2018-11-14

## 2018-11-07 RX ORDER — ATORVASTATIN CALCIUM 20 MG/1
20 TABLET, FILM COATED ORAL DAILY
Qty: 30 TABLET | Refills: 3 | Status: SHIPPED | OUTPATIENT
Start: 2018-11-07 | End: 2019-03-06 | Stop reason: SDUPTHER

## 2018-11-07 RX ORDER — MINOXIDIL 10 MG/1
TABLET ORAL
Qty: 30 TABLET | Refills: 3 | Status: SHIPPED | OUTPATIENT
Start: 2018-11-07 | End: 2019-04-08 | Stop reason: SDUPTHER

## 2018-11-07 RX ORDER — LOSARTAN POTASSIUM 100 MG/1
100 TABLET ORAL DAILY
Qty: 30 TABLET | Refills: 3 | Status: SHIPPED | OUTPATIENT
Start: 2018-11-07 | End: 2019-05-09 | Stop reason: SDUPTHER

## 2018-11-07 RX ORDER — FUROSEMIDE 20 MG/1
20 TABLET ORAL 2 TIMES DAILY
Qty: 60 TABLET | Refills: 3 | Status: SHIPPED | OUTPATIENT
Start: 2018-11-07 | End: 2018-11-08 | Stop reason: SDUPTHER

## 2018-11-07 RX ORDER — FUROSEMIDE 20 MG/1
TABLET ORAL
Qty: 60 TABLET | Refills: 5 | Status: CANCELLED | OUTPATIENT
Start: 2018-11-07

## 2018-11-07 RX ORDER — ERGOCALCIFEROL (VITAMIN D2) 1250 MCG
CAPSULE ORAL
Qty: 8 CAPSULE | Refills: 3 | Status: SHIPPED | OUTPATIENT
Start: 2018-11-07

## 2018-11-07 RX ORDER — INSULIN GLARGINE 100 [IU]/ML
35 INJECTION, SOLUTION SUBCUTANEOUS NIGHTLY
Qty: 1 VIAL | Refills: 3 | Status: SHIPPED | OUTPATIENT
Start: 2018-11-07 | End: 2018-11-14

## 2018-11-07 RX ORDER — LURASIDONE HYDROCHLORIDE 20 MG/1
TABLET, FILM COATED ORAL
Refills: 2 | COMMUNITY
Start: 2018-10-23

## 2018-11-07 RX ORDER — AMLODIPINE BESYLATE 10 MG/1
10 TABLET ORAL DAILY
Qty: 30 TABLET | Refills: 3 | Status: SHIPPED | OUTPATIENT
Start: 2018-11-07 | End: 2019-07-27 | Stop reason: SDUPTHER

## 2018-11-07 ASSESSMENT — PATIENT HEALTH QUESTIONNAIRE - PHQ9
10. IF YOU CHECKED OFF ANY PROBLEMS, HOW DIFFICULT HAVE THESE PROBLEMS MADE IT FOR YOU TO DO YOUR WORK, TAKE CARE OF THINGS AT HOME, OR GET ALONG WITH OTHER PEOPLE: 2
7. TROUBLE CONCENTRATING ON THINGS, SUCH AS READING THE NEWSPAPER OR WATCHING TELEVISION: 3
SUM OF ALL RESPONSES TO PHQ QUESTIONS 1-9: 21
2. FEELING DOWN, DEPRESSED OR HOPELESS: 3
SUM OF ALL RESPONSES TO PHQ9 QUESTIONS 1 & 2: 5
3. TROUBLE FALLING OR STAYING ASLEEP: 3
9. THOUGHTS THAT YOU WOULD BE BETTER OFF DEAD, OR OF HURTING YOURSELF: 0
5. POOR APPETITE OR OVEREATING: 2
6. FEELING BAD ABOUT YOURSELF - OR THAT YOU ARE A FAILURE OR HAVE LET YOURSELF OR YOUR FAMILY DOWN: 3
SUM OF ALL RESPONSES TO PHQ QUESTIONS 1-9: 21
4. FEELING TIRED OR HAVING LITTLE ENERGY: 3
8. MOVING OR SPEAKING SO SLOWLY THAT OTHER PEOPLE COULD HAVE NOTICED. OR THE OPPOSITE, BEING SO FIGETY OR RESTLESS THAT YOU HAVE BEEN MOVING AROUND A LOT MORE THAN USUAL: 2
1. LITTLE INTEREST OR PLEASURE IN DOING THINGS: 2

## 2018-11-07 NOTE — PROGRESS NOTES
[facility]  [facility]    Attending Physician Statement:  Fady Rodriguez. Joe Mariscal M.D., F.A.C.P. I have discussed the case, including pertinent history and exam findings with the resident. I have seen and examined the patient and the key elements of the encounter have been performed by me. I agree with the assessment, plan and orders as documented by the resident. Patient here for routine follow up of medical problems. Recent admission for new onset DKA    Patient since DC has no acute complaints    Tolerating medications   Noted to have Type I DM- but questionable in nature    Patient has not been utilizing sliding scale insulin as recommended   Only checking glucose daily in nature    Need to resume sliding scale insulin- but can only do so with appropriate checks   Establish glucose log and bring in to discuss       Type I DM- ? Unlikely given not taking prandial insulin and no noted recurrent DKA    Start ISS again   Start glucose log    Continue basal insulin   Will adjust with appropriate glucose monitoring    Referral to Dr. Madisyn Hernandez for ENDO     HTN- resistant in nature    BP controlled   Verified med list with Nephrology    Continue current regimen    Follow with Nephrology     CKD stage II with nephrotic syndrome    Continue follow-up at this time     ? Hemorrhoids vs. Fissure    Patient refused rectal exam despite counseling   + BRBPR with wiping and intermittently in toilet bowel    No noted melena   No additional findings   PRN topical treatment - unable to fully eval as discussed due to refusal of rectal exam   Gen Surgery assessment needed- referral    CBC for assessment of blood counts     Remainder of medical problems as per resident note.     ADDENDUM:  9:09 AM  11/8/18    Reviewed labs and coordinated care with Nephrology for medication clarification  Patient is to continue sodium bicarb, only take lasix daily    Called patient- spoke to significant
Dihydroxy; Future  -     continue ergocalciferol (DRISDOL) 84707 units capsule; Take with meal 2x/week    CKD stage III    - HOLD sodium bicarbonate 650 MG tablet; Take 1 tablet by mouth 3 times daily  - will review with Dr. Pily Zhang regarding continue sodium bicarbonate. ? Anal fissure vs Hemorroids  -     hydrocortisone (ALA-PJ) 1 % cream; Apply topically 2 times daily.  -     CBC WITH AUTO DIFFERENTIAL;  Future     RTC in 3 month    I have reviewed my findings and recommendations with Maritza Zurita and Dr. Pastor Gabriel MD PGY-1   11/7/2018 1:28 PM

## 2018-11-08 RX ORDER — FUROSEMIDE 20 MG/1
20 TABLET ORAL DAILY
Qty: 60 TABLET | Refills: 3
Start: 2018-11-08 | End: 2019-05-24

## 2018-11-09 ENCOUNTER — OFFICE VISIT (OUTPATIENT)
Dept: ENDOCRINOLOGY | Age: 33
End: 2018-11-09
Payer: COMMERCIAL

## 2018-11-09 VITALS
DIASTOLIC BLOOD PRESSURE: 75 MMHG | HEIGHT: 70 IN | WEIGHT: 259 LBS | SYSTOLIC BLOOD PRESSURE: 125 MMHG | OXYGEN SATURATION: 98 % | HEART RATE: 64 BPM | RESPIRATION RATE: 16 BRPM | BODY MASS INDEX: 37.08 KG/M2

## 2018-11-09 PROCEDURE — G8427 DOCREV CUR MEDS BY ELIG CLIN: HCPCS | Performed by: INTERNAL MEDICINE

## 2018-11-09 PROCEDURE — 1036F TOBACCO NON-USER: CPT | Performed by: INTERNAL MEDICINE

## 2018-11-09 PROCEDURE — G8484 FLU IMMUNIZE NO ADMIN: HCPCS | Performed by: INTERNAL MEDICINE

## 2018-11-09 PROCEDURE — 2022F DILAT RTA XM EVC RTNOPTHY: CPT | Performed by: INTERNAL MEDICINE

## 2018-11-09 PROCEDURE — 99202 OFFICE O/P NEW SF 15 MIN: CPT | Performed by: INTERNAL MEDICINE

## 2018-11-09 PROCEDURE — G8417 CALC BMI ABV UP PARAM F/U: HCPCS | Performed by: INTERNAL MEDICINE

## 2018-11-09 PROCEDURE — 99214 OFFICE O/P EST MOD 30 MIN: CPT | Performed by: INTERNAL MEDICINE

## 2018-11-09 PROCEDURE — 3046F HEMOGLOBIN A1C LEVEL >9.0%: CPT | Performed by: INTERNAL MEDICINE

## 2018-11-09 RX ORDER — GLIMEPIRIDE 4 MG/1
4 TABLET ORAL
Qty: 30 TABLET | Refills: 0 | Status: SHIPPED | OUTPATIENT
Start: 2018-11-09 | End: 2018-12-07 | Stop reason: SDUPTHER

## 2018-11-09 ASSESSMENT — ENCOUNTER SYMPTOMS
SHORTNESS OF BREATH: 0
BACK PAIN: 0
COUGH: 0
ABDOMINAL PAIN: 0
BLOOD IN STOOL: 1
EYE PAIN: 0
RHINORRHEA: 0
RECTAL PAIN: 1
EYE ITCHING: 0
EYE DISCHARGE: 0
CONSTIPATION: 0
COLOR CHANGE: 0
SORE THROAT: 0
DIARRHEA: 0

## 2018-11-09 NOTE — PATIENT INSTRUCTIONS
times:________________________________________________. (For example: Before meals, at bedtime, before exercise, during exercise, other.)  · Your blood sugar target range before a meal is ___________________. Your blood sugar target range after a meal is _______________________. · Do this--___________________________________________________--to get your blood sugar back within your safe range if your blood sugar results are _________________________________________. (For example: Less than 70 or above 250 mg/dL.)  Call your doctor when your blood sugar results are ___________________________________. (For example: Less than 70 or above 250 mg/dL.)  What are the symptoms of low and high blood sugar? Common symptoms of low blood sugar are sweating and feeling shaky, weak, hungry, or confused. Symptoms can start quickly. Common symptoms of high blood sugar are feeling very thirsty or very hungry. You may also pass urine more often than usual. You may have blurry vision and may lose weight without trying. But some people may have high or low blood sugar without having any symptoms. That's a good reason to check your blood sugar on a regular schedule. What should you do if you have symptoms? Work with your doctor to fill in the blank spaces below that apply to you. Low blood sugar  If you have symptoms of low blood sugar, check your blood sugar. If it's below _____ ( for example, below 70), eat or drink a quick-sugar food that has about 15 grams of carbohydrate. Your goal is to get your level back to your safe range. Check your blood sugar again 15 minutes later. If it's still not in your target range, take another 15 grams of carbohydrate and check your blood sugar again in 15 minutes. Repeat this until you reach your target. Then go back to your regular testing schedule.   When you have low blood sugar, it's best to stop or reduce any physical activity until your blood sugar is back in your target range and is stable. If you must stay active, eat or drink 30 grams of carbohydrate. Then check your blood sugar again in 15 minutes. If it's not in your target range, take another 30 grams of carbohydrates. Check your blood sugar again in 15 minutes. Keep doing this until you reach your target. You can then go back to your regular testing schedule. If your symptoms or blood sugar levels are getting worse or have not improved after 15 minutes, seek medical care right away. Here are some examples of quick-sugar foods with 15 grams of carbohydrate:  · 3 or 4 glucose tablets  · 1 tube of glucose gel  · Hard candy (such as 3 Jolly Ranchers or 5 to 7 Life Savers)  · ½ cup to ¾ cup (4 to 6 ounces) of fruit juice or regular (not diet) soda  High blood sugar  If you have symptoms of high blood sugar, check your blood sugar. Your goal is to get your level back to your target range. If it's above ______ ( for example, above 250), follow these steps:  · If you missed a dose of your diabetes medicine, take it now. Take only the amount of medicine that you have been prescribed. Do not take more or less medicine. · Give yourself insulin if your doctor has prescribed it for high blood sugar. · Test for ketones, if the doctor told you to do so. If the results of the ketone test show a moderate-to-large amount of ketones, call the doctor for advice. · Wait 30 minutes after you take the extra insulin or the missed medicine. Check your blood sugar again. If your symptoms or blood sugar levels are getting worse or have not improved after taking these steps, seek medical care right away. Follow-up care is a key part of your treatment and safety. Be sure to make and go to all appointments, and call your doctor if you are having problems. It's also a good idea to know your test results and keep a list of the medicines you take. Where can you learn more? Go to https://jovanny.[employer]. org and sign in to your [employer] account.  Enter need to check the amount of glucose in their blood. A home blood glucose test is an easy way to test your blood at home or when you are away from home. The results help you know when to take action to keep your blood glucose levels in a target range. How can you prepare for the test?  · Check the expiration date on the bottle of testing strips. Do not use test strips that have . · Match the code number on the testing strips bottle with the number on the meter. If the numbers do not match, follow the directions with the meter for changing the code number. What happens before the test?  The supplies you will need for testing blood glucose include:  · A blood glucose meter. · Testing strips. These are made to be used with a specific model of meter. · Sugar control solutions. Some meters require a specific solution. Many new meters are made to operate without a control solution. · Short needles called lancets for pricking your skin. · A pen-sized friend for the lancet (lancet device), which positions the lancet and controls how deeply it goes into your skin. · Clean cotton balls. These are used to stop the bleeding from the testing site. What happens during the test?  A home blood glucose test involves pricking your finger, palm, or forearm with a lancet to collect a drop of blood. The blood drop is placed on a test strip, which you insert into the blood glucose meter. The instructions for testing are slightly different for each blood glucose meter model. Follow the instructions that came with your meter. · Wash your hands with warm, soapy water. Dry them well with a clean towel. You may also use an alcohol wipe to clean your finger or other site, but make sure your hands are dry before the test.  · Insert a clean lancet into the lancet device. · Remove a test strip from the test strip bottle. Replace the lid immediately to keep moisture away from the other strips.   · Follow the instructions that came

## 2018-11-10 LAB
TISSUE TRANSGLUTAMINASE IGA: 0 U/ML (ref 0–3)
VITAMIN D 1,25-DIHYDROXY: 57 PG/ML (ref 19.9–79.3)

## 2018-11-14 ENCOUNTER — TELEPHONE (OUTPATIENT)
Dept: INTERNAL MEDICINE | Age: 33
End: 2018-11-14

## 2018-11-14 DIAGNOSIS — E10.11 TYPE 1 DIABETES MELLITUS WITH KETOACIDOTIC COMA (HCC): Primary | ICD-10-CM

## 2018-11-29 ENCOUNTER — HOSPITAL ENCOUNTER (OUTPATIENT)
Age: 33
Discharge: HOME | End: 2018-11-29
Payer: COMMERCIAL

## 2018-11-29 LAB
ANION GAP SERPL CALCULATED.3IONS-SCNC: 15 MMOL/L (ref 7–16)
BUN BLDV-MCNC: 27 MG/DL (ref 6–20)
CALCIUM SERPL-MCNC: 9.1 MG/DL (ref 8.6–10.2)
CHLORIDE BLD-SCNC: 106 MMOL/L (ref 98–107)
CO2: 21 MMOL/L (ref 22–29)
CREAT SERPL-MCNC: 2.6 MG/DL (ref 0.7–1.2)
GFR AFRICAN AMERICAN: 34
GFR NON-AFRICAN AMERICAN: 28 ML/MIN/1.73
GLUCOSE BLD-MCNC: 84 MG/DL (ref 74–99)
HCT VFR BLD CALC: 40 % (ref 37–54)
HEMOGLOBIN: 12.9 G/DL (ref 12.5–16.5)
MAGNESIUM: 2.1 MG/DL (ref 1.6–2.6)
MCH RBC QN AUTO: 29.3 PG (ref 26–35)
MCHC RBC AUTO-ENTMCNC: 32.3 % (ref 32–34.5)
MCV RBC AUTO: 90.9 FL (ref 80–99.9)
PDW BLD-RTO: 13.9 FL (ref 11.5–15)
PLATELET # BLD: 228 E9/L (ref 130–450)
PMV BLD AUTO: 10.8 FL (ref 7–12)
POTASSIUM SERPL-SCNC: 3.9 MMOL/L (ref 3.5–5)
RBC # BLD: 4.4 E12/L (ref 3.8–5.8)
SODIUM BLD-SCNC: 142 MMOL/L (ref 132–146)
WBC # BLD: 4.1 E9/L (ref 4.5–11.5)

## 2018-11-29 PROCEDURE — 83735 ASSAY OF MAGNESIUM: CPT

## 2018-11-29 PROCEDURE — 85027 COMPLETE CBC AUTOMATED: CPT

## 2018-11-29 PROCEDURE — 36415 COLL VENOUS BLD VENIPUNCTURE: CPT

## 2018-11-29 PROCEDURE — 80048 BASIC METABOLIC PNL TOTAL CA: CPT

## 2018-11-29 PROCEDURE — 84681 ASSAY OF C-PEPTIDE: CPT

## 2018-12-01 LAB — C-PEPTIDE: 3.3 NG/ML (ref 0.8–3.5)

## 2018-12-07 ENCOUNTER — OFFICE VISIT (OUTPATIENT)
Dept: ENDOCRINOLOGY | Age: 33
End: 2018-12-07
Payer: COMMERCIAL

## 2018-12-07 VITALS
HEIGHT: 70 IN | SYSTOLIC BLOOD PRESSURE: 143 MMHG | RESPIRATION RATE: 12 BRPM | DIASTOLIC BLOOD PRESSURE: 91 MMHG | TEMPERATURE: 98.6 F | BODY MASS INDEX: 40.09 KG/M2 | OXYGEN SATURATION: 95 % | HEART RATE: 77 BPM | WEIGHT: 280 LBS

## 2018-12-07 PROBLEM — E11.9 TYPE 2 DIABETES MELLITUS, WITH LONG-TERM CURRENT USE OF INSULIN (HCC): Status: ACTIVE | Noted: 2018-10-03

## 2018-12-07 PROBLEM — Z79.4 TYPE 2 DIABETES MELLITUS, WITH LONG-TERM CURRENT USE OF INSULIN (HCC): Status: ACTIVE | Noted: 2018-10-03

## 2018-12-07 PROCEDURE — 99214 OFFICE O/P EST MOD 30 MIN: CPT | Performed by: INTERNAL MEDICINE

## 2018-12-07 PROCEDURE — 99212 OFFICE O/P EST SF 10 MIN: CPT | Performed by: INTERNAL MEDICINE

## 2018-12-07 PROCEDURE — 1036F TOBACCO NON-USER: CPT | Performed by: INTERNAL MEDICINE

## 2018-12-07 PROCEDURE — G8427 DOCREV CUR MEDS BY ELIG CLIN: HCPCS | Performed by: INTERNAL MEDICINE

## 2018-12-07 PROCEDURE — G8417 CALC BMI ABV UP PARAM F/U: HCPCS | Performed by: INTERNAL MEDICINE

## 2018-12-07 PROCEDURE — G8484 FLU IMMUNIZE NO ADMIN: HCPCS | Performed by: INTERNAL MEDICINE

## 2018-12-07 PROCEDURE — 2022F DILAT RTA XM EVC RTNOPTHY: CPT | Performed by: INTERNAL MEDICINE

## 2018-12-07 PROCEDURE — 3046F HEMOGLOBIN A1C LEVEL >9.0%: CPT | Performed by: INTERNAL MEDICINE

## 2018-12-07 RX ORDER — GLIMEPIRIDE 4 MG/1
4 TABLET ORAL
Qty: 90 TABLET | Refills: 1 | Status: SHIPPED | OUTPATIENT
Start: 2018-12-07 | End: 2019-07-28 | Stop reason: SDUPTHER

## 2018-12-07 RX ORDER — LANCETS 30 GAUGE
1 EACH MISCELLANEOUS 4 TIMES DAILY
Qty: 600 EACH | Refills: 1 | Status: SHIPPED | OUTPATIENT
Start: 2018-12-07

## 2018-12-07 NOTE — PROGRESS NOTES
- 450 E9/L 228       A/P -   1. Insulin dependent diabetes, most 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 - uncontrolled, but improving   Target a1c 6.5%   C-Peptide nml 3.3   Pt is rapidly gaining weight. He wishes to begin intermittent fasting b/c he has had success with losing weight in the past by doing this. Therefore, will have him hold the glimepiride on the days he is fasting and take 2-4 units of humalog prior to the one meal he eats during the day   Keep the glargine at 35 units (the reduction of food intake obviates the need to increase the dose)   Limit calories to 1800 sary/day   See me back in six-eight weeks    I spent 25 minutes in a face to face encounter with Diana Avila today.    >15 minutes of this was in education and counseling regarding insulin therapy and dietary interventions for weight reduction      Mark Santos  12/7/18

## 2018-12-17 RX ORDER — LANCETS 28 GAUGE
1 EACH MISCELLANEOUS DAILY
Qty: 400 EACH | Refills: 3 | Status: SHIPPED | OUTPATIENT
Start: 2018-12-17 | End: 2019-03-29 | Stop reason: SDUPTHER

## 2018-12-21 NOTE — PROGRESS NOTES
Attending note -  Pt seen and examined today with Dr. Divine Zuluaga. New onset diabetes diagnosed at time of admission for DKA. He is positive for BALWINDER and anti-islet cell antibodies. It is possible that His DKA may be secondary to autoimmune diabetes or it may have been from his olanzepine. The latter would be a surprise since he stopped it one to two weeks prior to the DKA. It is likely that his diabetes was present for some time prior to his presentation with DKA and was untreated and tolerated. If this was the case for >6months, then SAGAR is the best description of the underlying pathology of his diabetes. PE was nml for me (H - RRR without MGR, GI - abd soft, NT/ND, Pulm - CTA b/l)  Labs reviewed. We discussed insulin therapy with pt. Will cont lantus 35 units daily. Use Lispro for correction at 1:50 >150 (instructed pt to only use this if two hours after a meal. I am doing it after the meal so that it will be purely a correction dose. This will allow for an assessment of the effect of the glimepiride and will protect from overtreatment that may occur when giving the insulin before the meal with the glimepiride (should the glimepiride actually work)). Start glimepiride 4mg daily in case he has some beta cell function present. See me back in two weeks for BG review  I reviewed the case in entirety with resident and agree with his assessment and plan. However, I realized after the patient left that the instructions pertaining to his medications and his blood work were not included in the AVS by the resident. I then called him and made sure he knew what he was supposed to do pertaining to these.     Conor Fields MD  Endocrinology  11/09/18
[facility] [place] [facility] [place] [facility] [place] regarding [place]. They had wanted to cancel appt on 2/12/2019 as pt was going to [place] instead. They left the number [phone]. Writer called this number, and did not get an answer. Writer then called pt number on file and left vm for oncology line.   
Constitutional: Negative for activity change, diaphoresis and fatigue. HENT: Negative for congestion, hearing loss, rhinorrhea, sneezing and sore throat. Eyes: Negative for pain, discharge and itching. Respiratory: Negative for cough and shortness of breath. Cardiovascular: Negative for chest pain, palpitations and leg swelling. Gastrointestinal: Positive for blood in stool and rectal pain. Negative for abdominal pain, constipation and diarrhea. Endocrine: Negative for polydipsia, polyphagia and polyuria. Genitourinary: Negative for difficulty urinating, dysuria and hematuria. Musculoskeletal: Negative for arthralgias, back pain and joint swelling. Skin: Negative for color change, pallor and rash. Neurological: Negative for dizziness, syncope, light-headedness and headaches. Psychiatric/Behavioral: Positive for decreased concentration and dysphoric mood. Negative for agitation, behavioral problems, hallucinations and sleep disturbance. The patient is not nervous/anxious. Objective:   Physical Exam   Constitutional: He is oriented to person, place, and time. He appears well-developed and well-nourished. HENT:   Head: Normocephalic and atraumatic. Eyes: Pupils are equal, round, and reactive to light. Conjunctivae are normal.   Neck: Normal range of motion. Neck supple. Cardiovascular: Normal rate and regular rhythm. Pulmonary/Chest: Effort normal and breath sounds normal.   Abdominal: Soft. Musculoskeletal: Normal range of motion. Neurological: He is alert and oriented to person, place, and time. Skin: Skin is warm and dry. Psychiatric: He has a normal mood and affect.  His behavior is normal. Judgment and thought content normal.   Feet - good monofilament b/l, no ulcers or wounds    Assessment and Plan:        Eloina Melo was seen today for diabetes, exact type as of yet unknowb    Diagnoses and all orders for this visit:    Insulin dependent diabetes mellitus ([facility] [address]  -

## 2019-02-25 ENCOUNTER — TELEPHONE (OUTPATIENT)
Dept: INTERNAL MEDICINE | Age: 34
End: 2019-02-25

## 2019-03-02 NOTE — H&P
tablet by mouth daily (Patient taking differently: Take 200 mg by mouth daily )  atorvastatin (LIPITOR) 20 MG tablet, Take 1 tablet by mouth daily  lisinopril (PRINIVIL;ZESTRIL) 10 MG tablet, Take 1 tablet by mouth daily  Blood Pressure Monitoring (ADULT BLOOD PRESSURE CUFF LG) KIT, 1 kit by Does not apply route daily  Allergies:  Patient has no known allergies. Social History:   TOBACCO:  Currently smokes. ETOH:   reports that he drinks alcohol. DRUGS:   reports that he uses drugs, including Marijuana. Family History:   History reviewed. No pertinent family history. REVIEW OF SYSTEMS:  CONSTITUTIONAL:  positive for  fevers, chills, fatigue and anorexia  HENT: negative for sore throat, ear pain  EYES:  positive for  blurred vision, vision disturbance. Negative for pain, discharge, redness. RESPIRATORY:  positive for cough, shortness of breath. CARDIOVASCULAR:  positive for  chest pain  GASTROINTESTINAL:  positive for nausea, vomiting and abdominal pain. Negative for diarrhea and abdominal distention  GENITOURINARY:  negative for frequency and dysuria  MUSCULOSKELETAL:  Negative for arthralgias and back pain. NEUROLOGICAL:  Positive for dizziness. BEHAVIOR/PSYCH:  positive for poor appetite.       PHYSICAL EXAM:  VITALS:  BP (!) 141/75   Pulse 73   Temp 97.8 °F (36.6 °C) (Temporal)   Resp 14   Ht 5' 9\" (1.753 m)   Wt 255 lb (115.7 kg)   SpO2 98%   BMI 37.66 kg/m²     CONSTITUTIONAL:  alert, cooperative and mild distress  EYES:  Lids and lashes normal, pupils equal, round and reactive to light, extra ocular muscles intact, sclera clear, conjunctiva normal  ENT:  normocepalic, without obvious abnormality, atraumatic  NECK:  Supple, symmetrical, trachea midline, no adenopathy, thyroid symmetric, not enlarged and no tenderness, skin normal  LUNGS:  No increased work of breathing, good air exchange, clear to auscultation bilaterally, no crackles or wheezing  CARDIOVASCULAR: Regular rate and 02-Mar-2019 07:47 rhythm, normal S1 and S2, and no murmur noted. ABDOMEN: Normal bowel sounds, soft, non-distended, non-tender, no masses palpated. CHEST/BREASTS:  Atraumatic. Non-tender. MUSCULOSKELETAL:  Full range of motion noted. Motor strength is 5 out of 5 all extremities bilaterally. Tone is normal. No redness or warmth. NEUROLOGIC: Alert and oriented to person and place, but not time. Attention/Concentration:  abnormal - repeating same sentence multiple times, not answering all questions appropriately. Sensory intact. Normal strength. Normal muscle tone.   SKIN:  no bruising or bleeding, normal skin color, texture, turgor and no redness, warmth, or swelling    DATA:  CBC with Differential:    Lab Results   Component Value Date    WBC 7.4 09/19/2018    RBC 5.20 09/19/2018    HGB 15.5 09/19/2018    HCT 50.6 09/19/2018     09/19/2018    MCV 97.3 09/19/2018    MCH 29.8 09/19/2018    MCHC 30.6 09/19/2018    RDW 13.2 09/19/2018    LYMPHOPCT 8.3 09/19/2018    MONOPCT 5.7 09/19/2018    BASOPCT 0.5 09/19/2018    MONOSABS 0.42 09/19/2018    LYMPHSABS 0.61 09/19/2018    EOSABS 0.00 09/19/2018    BASOSABS 0.04 09/19/2018     CMP:    Lab Results   Component Value Date     09/19/2018    K 6.8 09/19/2018    CL 82 09/19/2018    CO2 16 09/19/2018    BUN 74 09/19/2018    CREATININE 5.1 09/19/2018    GFRAA 16 09/19/2018    LABGLOM 13 09/19/2018    GLUCOSE  09/19/2018      Comment:      HIGH     GLUCOSE 85 03/14/2011    PROT 8.4 09/19/2018    LABALBU 4.9 09/19/2018    LABALBU 4.4 03/14/2011    CALCIUM 9.2 09/19/2018    BILITOT 1.0 09/19/2018    ALKPHOS 154 09/19/2018    AST 10 09/19/2018    ALT 18 09/19/2018     PT/INR:    Lab Results   Component Value Date    PROTIME 11.6 09/01/2016    INR 1.0 09/01/2016     U/A:    Lab Results   Component Value Date    NITRITE neg 03/10/2011    COLORU Yellow 09/19/2018    PROTEINU Negative 09/19/2018    PHUR 5.5 09/19/2018    WBCUA 0-1 09/19/2018    RBCUA NONE 09/19/2018    BACTERIA RARE

## 2019-03-06 DIAGNOSIS — E78.5 HYPERLIPIDEMIA, UNSPECIFIED HYPERLIPIDEMIA TYPE: ICD-10-CM

## 2019-03-06 RX ORDER — ATORVASTATIN CALCIUM 20 MG/1
TABLET, FILM COATED ORAL
Qty: 30 TABLET | Refills: 3 | Status: SHIPPED | OUTPATIENT
Start: 2019-03-06 | End: 2019-07-14 | Stop reason: SDUPTHER

## 2019-03-22 ENCOUNTER — HOSPITAL ENCOUNTER (OUTPATIENT)
Age: 34
Discharge: HOME | End: 2019-03-22
Payer: COMMERCIAL

## 2019-03-22 LAB
ANION GAP SERPL CALCULATED.3IONS-SCNC: 13 MMOL/L (ref 7–16)
BASOPHILS ABSOLUTE: 0.03 E9/L (ref 0–0.2)
BASOPHILS RELATIVE PERCENT: 0.6 % (ref 0–2)
BUN BLDV-MCNC: 21 MG/DL (ref 6–20)
CALCIUM SERPL-MCNC: 8.6 MG/DL (ref 8.6–10.2)
CHLORIDE BLD-SCNC: 106 MMOL/L (ref 98–107)
CHOLESTEROL, TOTAL: 217 MG/DL (ref 0–199)
CO2: 24 MMOL/L (ref 22–29)
CREAT SERPL-MCNC: 2.2 MG/DL (ref 0.7–1.2)
EOSINOPHILS ABSOLUTE: 0.2 E9/L (ref 0.05–0.5)
EOSINOPHILS RELATIVE PERCENT: 4.1 % (ref 0–6)
GFR AFRICAN AMERICAN: 42
GFR NON-AFRICAN AMERICAN: 34 ML/MIN/1.73
GLUCOSE BLD-MCNC: 180 MG/DL (ref 74–99)
HCT VFR BLD CALC: 41.1 % (ref 37–54)
HDLC SERPL-MCNC: 24 MG/DL
HEMOGLOBIN: 13.4 G/DL (ref 12.5–16.5)
IMMATURE GRANULOCYTES #: 0.01 E9/L
IMMATURE GRANULOCYTES %: 0.2 % (ref 0–5)
LDL CHOLESTEROL CALCULATED: 128 MG/DL (ref 0–99)
LYMPHOCYTES ABSOLUTE: 1.66 E9/L (ref 1.5–4)
LYMPHOCYTES RELATIVE PERCENT: 33.7 % (ref 20–42)
MCH RBC QN AUTO: 30.2 PG (ref 26–35)
MCHC RBC AUTO-ENTMCNC: 32.6 % (ref 32–34.5)
MCV RBC AUTO: 92.6 FL (ref 80–99.9)
MONOCYTES ABSOLUTE: 0.48 E9/L (ref 0.1–0.95)
MONOCYTES RELATIVE PERCENT: 9.7 % (ref 2–12)
NEUTROPHILS ABSOLUTE: 2.55 E9/L (ref 1.8–7.3)
NEUTROPHILS RELATIVE PERCENT: 51.7 % (ref 43–80)
PDW BLD-RTO: 14.7 FL (ref 11.5–15)
PHOSPHORUS: 2.2 MG/DL (ref 2.5–4.5)
PLATELET # BLD: 221 E9/L (ref 130–450)
PMV BLD AUTO: 11.7 FL (ref 7–12)
POTASSIUM SERPL-SCNC: 4.5 MMOL/L (ref 3.5–5)
RBC # BLD: 4.44 E12/L (ref 3.8–5.8)
SODIUM BLD-SCNC: 143 MMOL/L (ref 132–146)
TRIGL SERPL-MCNC: 327 MG/DL (ref 0–149)
VLDLC SERPL CALC-MCNC: 65 MG/DL
WBC # BLD: 4.9 E9/L (ref 4.5–11.5)

## 2019-03-22 PROCEDURE — 84100 ASSAY OF PHOSPHORUS: CPT

## 2019-03-22 PROCEDURE — 36415 COLL VENOUS BLD VENIPUNCTURE: CPT

## 2019-03-22 PROCEDURE — 80061 LIPID PANEL: CPT

## 2019-03-22 PROCEDURE — 85025 COMPLETE CBC W/AUTO DIFF WBC: CPT

## 2019-03-22 PROCEDURE — 80048 BASIC METABOLIC PNL TOTAL CA: CPT

## 2019-03-29 ENCOUNTER — OFFICE VISIT (OUTPATIENT)
Dept: ENDOCRINOLOGY | Age: 34
End: 2019-03-29
Payer: COMMERCIAL

## 2019-03-29 VITALS
RESPIRATION RATE: 16 BRPM | OXYGEN SATURATION: 97 % | DIASTOLIC BLOOD PRESSURE: 82 MMHG | HEIGHT: 70 IN | SYSTOLIC BLOOD PRESSURE: 138 MMHG | WEIGHT: 259.7 LBS | BODY MASS INDEX: 37.18 KG/M2 | HEART RATE: 80 BPM | TEMPERATURE: 98.1 F

## 2019-03-29 LAB — HBA1C MFR BLD: 7.1 %

## 2019-03-29 PROCEDURE — G8484 FLU IMMUNIZE NO ADMIN: HCPCS | Performed by: INTERNAL MEDICINE

## 2019-03-29 PROCEDURE — G8427 DOCREV CUR MEDS BY ELIG CLIN: HCPCS | Performed by: INTERNAL MEDICINE

## 2019-03-29 PROCEDURE — 83036 HEMOGLOBIN GLYCOSYLATED A1C: CPT | Performed by: INTERNAL MEDICINE

## 2019-03-29 PROCEDURE — 99213 OFFICE O/P EST LOW 20 MIN: CPT | Performed by: INTERNAL MEDICINE

## 2019-03-29 PROCEDURE — G8417 CALC BMI ABV UP PARAM F/U: HCPCS | Performed by: INTERNAL MEDICINE

## 2019-03-29 PROCEDURE — 3045F PR MOST RECENT HEMOGLOBIN A1C LEVEL 7.0-9.0%: CPT | Performed by: INTERNAL MEDICINE

## 2019-03-29 PROCEDURE — 1036F TOBACCO NON-USER: CPT | Performed by: INTERNAL MEDICINE

## 2019-03-29 PROCEDURE — 2022F DILAT RTA XM EVC RTNOPTHY: CPT | Performed by: INTERNAL MEDICINE

## 2019-03-29 RX ORDER — LANCETS 28 GAUGE
1 EACH MISCELLANEOUS DAILY
Qty: 400 EACH | Refills: 3 | Status: SHIPPED | OUTPATIENT
Start: 2019-03-29

## 2019-04-08 DIAGNOSIS — I10 ESSENTIAL HYPERTENSION: ICD-10-CM

## 2019-04-08 RX ORDER — MINOXIDIL 10 MG/1
TABLET ORAL
Qty: 30 TABLET | Refills: 0 | Status: SHIPPED | OUTPATIENT
Start: 2019-04-08

## 2019-04-14 DIAGNOSIS — E10.11 TYPE 1 DIABETES MELLITUS WITH KETOACIDOTIC COMA (HCC): ICD-10-CM

## 2019-04-18 RX ORDER — INSULIN GLARGINE 100 [IU]/ML
INJECTION, SOLUTION SUBCUTANEOUS
Qty: 5 PEN | Refills: 0 | Status: SHIPPED | OUTPATIENT
Start: 2019-04-18 | End: 2019-08-01 | Stop reason: SDUPTHER

## 2019-05-09 DIAGNOSIS — I10 ESSENTIAL HYPERTENSION: ICD-10-CM

## 2019-05-10 RX ORDER — LOSARTAN POTASSIUM 100 MG/1
TABLET ORAL
Qty: 30 TABLET | Refills: 0 | Status: ON HOLD
Start: 2019-05-10 | End: 2022-08-02 | Stop reason: SDUPTHER

## 2019-05-24 ENCOUNTER — OFFICE VISIT (OUTPATIENT)
Dept: ENDOCRINOLOGY | Age: 34
End: 2019-05-24
Payer: COMMERCIAL

## 2019-05-24 VITALS
BODY MASS INDEX: 37.65 KG/M2 | HEART RATE: 77 BPM | HEIGHT: 70 IN | OXYGEN SATURATION: 98 % | RESPIRATION RATE: 18 BRPM | DIASTOLIC BLOOD PRESSURE: 72 MMHG | WEIGHT: 263 LBS | SYSTOLIC BLOOD PRESSURE: 131 MMHG

## 2019-05-24 DIAGNOSIS — L98.499 SKIN ULCER, UNSPECIFIED ULCER STAGE (HCC): Primary | ICD-10-CM

## 2019-05-24 PROCEDURE — 99213 OFFICE O/P EST LOW 20 MIN: CPT | Performed by: INTERNAL MEDICINE

## 2019-05-24 RX ORDER — FLUOXETINE HYDROCHLORIDE 20 MG/1
CAPSULE ORAL
Refills: 2 | COMMUNITY
Start: 2019-04-14

## 2019-05-24 RX ORDER — ASPIRIN 81 MG/1
TABLET ORAL
Refills: 3 | COMMUNITY
Start: 2019-04-14 | End: 2019-05-24

## 2019-05-24 RX ORDER — POTASSIUM CHLORIDE 1500 MG/1
TABLET, EXTENDED RELEASE ORAL
Refills: 3 | Status: ON HOLD | COMMUNITY
Start: 2019-04-16 | End: 2022-08-02 | Stop reason: SDUPTHER

## 2019-05-24 RX ORDER — BUMETANIDE 1 MG/1
TABLET ORAL
Refills: 5 | Status: ON HOLD | COMMUNITY
Start: 2019-04-29 | End: 2022-08-02 | Stop reason: SDUPTHER

## 2019-05-24 NOTE — PROGRESS NOTES
atorvastatin (LIPITOR) 20 MG tablet TAKE 1 TABLET BY MOUTH EVERY DAY 30 tablet 3    blood glucose test strips (FREESTYLE TEST STRIPS) strip Use to check blood glucose four times each day. 400 each 2    glimepiride (AMARYL) 4 MG tablet Take 1 tablet by mouth every morning (before breakfast) 90 tablet 1    Lancets MISC 1 each by Does not apply route 4 times daily 600 each 1    Insulin Pen Needle ([employer] PEN NEEDLES 31G) 31G X 8 MM MISC 1 each by Does not apply route daily 100 each 3    Insulin Syringe-Needle U-100 30G X 1/2\" 1 ML MISC 1 each by Does not apply route daily Use as directed 100 each 11    glucagon 1 MG injection Infuse 1 mg intravenously once for 1 dose Administer 1 mg under skin if blood sugars go <70 with in ability to take glucose tablets 1 kit 3    furosemide (LASIX) 20 MG tablet Take 1 tablet by mouth daily 60 tablet 3    LATUDA 20 MG TABS tablet TAKE 1 TABLET BY MOUTH EVERY DAY EVERY AFTERNOON  2    traZODone (DESYREL) 50 MG tablet TAKE 1/2 TABLET BY MOUTH AT BEDTIME  2    ergocalciferol (DRISDOL) 54967 units capsule Take with meal 2x/week 8 capsule 3    aspirin 81 MG tablet Take 1 tablet by mouth daily 30 tablet 3    metoprolol succinate (TOPROL XL) 200 MG extended release tablet Take 1 tablet by mouth daily 30 tablet 3    amLODIPine (NORVASC) 10 MG tablet Take 1 tablet by mouth daily 30 tablet 3    amphetamine-dextroamphetamine (ADDERALL) 5 MG tablet TAKE 1 TABLET BY MOUTH TWICE A DAY  0    insulin lispro (HUMALOG) 100 UNIT/ML injection vial Check Blood Sugar daily -largest meal and Cover as follows:  =No Insulin, 151-200=2 units, 201-250=4 units, 251-300=6 units, 301-350=8 units, 351-400=10 units, Over 400 give 10 units and call Physician 1 vial 3    glucose monitoring kit (FREESTYLE) monitoring kit Use once.  1 kit 0    Blood Pressure Monitoring (ADULT BLOOD PRESSURE CUFF LG) KIT 1 kit by Does not apply route daily 1 kit 0    sodium bicarbonate 650 MG tablet Take 1 tablet by mouth 3 times daily 90 tablet 1     No current facility-administered medications on file prior to visit. ROS - Skin: superficial excoriation of the upper arm b/l, also has a follicular lesion of the upper lip    PE -  Gen : /72 (Site: Left Upper Arm, Position: Sitting, Cuff Size: Large Adult)   Pulse 77   Resp 18   Ht 5' 10\" (1.778 m)   Wt 263 lb (119.3 kg)   SpO2 98%   BMI 37.74 kg/m²    WN, WD, NAD  Lung: Nml resp effort  Psych: Normal mood   Full affect  Neur: Moves all extremities well    Test Results -  No new labs since last visit        A/P -   1. Insulin dependent diabetes, most 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 - uncontrolled   A1c 7.1% 3/29/19, 10.0% 11/7/18   Target a1c 6.5%   C-Peptide nml 3.3   Reports BG readings are good without glargine. Therefore will cont without it. The plan then will be    Stop Lantus    Cont taking glimepiride 4mg daily    Check the BG fasting every day    Limit calories to 1800 sary/day    2. Skin ulcerations - new problem   Apply neosporin to ulcers on arms 3x/day and see the urgent care physician at the [facility] clinic at the [address] to see if po antibiotic is needed    3.   Follow up    See me back in two months    Zeenat Rahman MD  Endocrinology/Obesity  5/24/19

## 2019-05-24 NOTE — PATIENT INSTRUCTIONS
Stop Lantus   Cont taking glimepiride 4mg daily   Check the BG fasting every day   Limit calories to 1800 sary/day   Apply neosporin to ulcers on arms 3x/day and see the urgent care physician at the Int Medicine clinic at the [address] [place] [address] Drive   See me back in two months

## 2019-05-28 ENCOUNTER — HOSPITAL ENCOUNTER (OUTPATIENT)
Age: 34
Discharge: HOME | End: 2019-05-28
Payer: COMMERCIAL

## 2019-05-28 LAB
ANION GAP SERPL CALCULATED.3IONS-SCNC: 16 MMOL/L (ref 7–16)
BASOPHILS ABSOLUTE: 0.03 E9/L (ref 0–0.2)
BASOPHILS RELATIVE PERCENT: 0.6 % (ref 0–2)
BUN BLDV-MCNC: 31 MG/DL (ref 6–20)
CALCIUM SERPL-MCNC: 9.2 MG/DL (ref 8.6–10.2)
CHLORIDE BLD-SCNC: 107 MMOL/L (ref 98–107)
CHOLESTEROL, TOTAL: 230 MG/DL (ref 0–199)
CO2: 19 MMOL/L (ref 22–29)
CREAT SERPL-MCNC: 2.5 MG/DL (ref 0.7–1.2)
EOSINOPHILS ABSOLUTE: 0.12 E9/L (ref 0.05–0.5)
EOSINOPHILS RELATIVE PERCENT: 2.5 % (ref 0–6)
GFR AFRICAN AMERICAN: 36
GFR NON-AFRICAN AMERICAN: 30 ML/MIN/1.73
GLUCOSE BLD-MCNC: 119 MG/DL (ref 74–99)
HCT VFR BLD CALC: 40.9 % (ref 37–54)
HDLC SERPL-MCNC: 29 MG/DL
HEMOGLOBIN: 13.4 G/DL (ref 12.5–16.5)
IMMATURE GRANULOCYTES #: 0.01 E9/L
IMMATURE GRANULOCYTES %: 0.2 % (ref 0–5)
LDL CHOLESTEROL CALCULATED: 175 MG/DL (ref 0–99)
LYMPHOCYTES ABSOLUTE: 1.4 E9/L (ref 1.5–4)
LYMPHOCYTES RELATIVE PERCENT: 28.8 % (ref 20–42)
MCH RBC QN AUTO: 30.6 PG (ref 26–35)
MCHC RBC AUTO-ENTMCNC: 32.8 % (ref 32–34.5)
MCV RBC AUTO: 93.4 FL (ref 80–99.9)
MONOCYTES ABSOLUTE: 0.41 E9/L (ref 0.1–0.95)
MONOCYTES RELATIVE PERCENT: 8.4 % (ref 2–12)
NEUTROPHILS ABSOLUTE: 2.89 E9/L (ref 1.8–7.3)
NEUTROPHILS RELATIVE PERCENT: 59.5 % (ref 43–80)
PDW BLD-RTO: 14.7 FL (ref 11.5–15)
PHOSPHORUS: 2.8 MG/DL (ref 2.5–4.5)
PLATELET # BLD: 197 E9/L (ref 130–450)
PMV BLD AUTO: 11.4 FL (ref 7–12)
POTASSIUM SERPL-SCNC: 4.2 MMOL/L (ref 3.5–5)
RBC # BLD: 4.38 E12/L (ref 3.8–5.8)
SODIUM BLD-SCNC: 142 MMOL/L (ref 132–146)
TRIGL SERPL-MCNC: 130 MG/DL (ref 0–149)
VLDLC SERPL CALC-MCNC: 26 MG/DL
WBC # BLD: 4.9 E9/L (ref 4.5–11.5)

## 2019-05-28 PROCEDURE — 84100 ASSAY OF PHOSPHORUS: CPT

## 2019-05-28 PROCEDURE — 80048 BASIC METABOLIC PNL TOTAL CA: CPT

## 2019-05-28 PROCEDURE — 85025 COMPLETE CBC W/AUTO DIFF WBC: CPT

## 2019-05-28 PROCEDURE — 80061 LIPID PANEL: CPT

## 2019-05-28 PROCEDURE — 36415 COLL VENOUS BLD VENIPUNCTURE: CPT

## 2019-05-30 ENCOUNTER — OFFICE VISIT (OUTPATIENT)
Dept: INTERNAL MEDICINE | Age: 34
End: 2019-05-30
Payer: COMMERCIAL

## 2019-05-30 VITALS
BODY MASS INDEX: 37.37 KG/M2 | HEART RATE: 71 BPM | WEIGHT: 261 LBS | DIASTOLIC BLOOD PRESSURE: 92 MMHG | OXYGEN SATURATION: 96 % | HEIGHT: 70 IN | SYSTOLIC BLOOD PRESSURE: 138 MMHG

## 2019-05-30 DIAGNOSIS — B96.89 BACTERIAL SKIN INFECTION: Primary | ICD-10-CM

## 2019-05-30 DIAGNOSIS — L08.9 BACTERIAL SKIN INFECTION: Primary | ICD-10-CM

## 2019-05-30 PROCEDURE — 99213 OFFICE O/P EST LOW 20 MIN: CPT | Performed by: INTERNAL MEDICINE

## 2019-05-30 RX ORDER — DOXYCYCLINE HYCLATE 100 MG
100 TABLET ORAL 2 TIMES DAILY
Qty: 20 TABLET | Refills: 0 | Status: SHIPPED | OUTPATIENT
Start: 2019-05-30 | End: 2019-06-28

## 2019-05-30 ASSESSMENT — PATIENT HEALTH QUESTIONNAIRE - PHQ9
SUM OF ALL RESPONSES TO PHQ QUESTIONS 1-9: 0
SUM OF ALL RESPONSES TO PHQ QUESTIONS 1-9: 0
1. LITTLE INTEREST OR PLEASURE IN DOING THINGS: 0
SUM OF ALL RESPONSES TO PHQ9 QUESTIONS 1 & 2: 0
2. FEELING DOWN, DEPRESSED OR HOPELESS: 0

## 2019-05-30 ASSESSMENT — ENCOUNTER SYMPTOMS
RESPIRATORY NEGATIVE: 1
COLOR CHANGE: 0

## 2019-05-30 NOTE — PROGRESS NOTES
Subjective:      Patient ID: Sonja Turner is a 28 y.o. male. HPI   Pt presented with one month duration of skin sores, started from right upper inferior nostril area, produced pus like material then scabbed over 2-3 weeks, then noted second spot on right proximal arm then on left inferior axillary area, reported irritative itching sensation in latter two areas, denies any other associated symptoms. Review of Systems   Constitutional: Negative for activity change, appetite change, chills, fatigue and fever. HENT: Negative. Respiratory: Negative. Cardiovascular: Negative. Skin: Positive for wound. Negative for color change and pallor. Objective:   Physical Exam   Skin:   Thin scab on right upper mouth area without discahrge size 1 cm round without tenderness. 1/2 cm excoriated superfically ulcerated lesion without discharge without tenderness or local LN noted. 1 cm same character lesion on left inferior axillary area withotu axillary lyphadenopathy noted. The bottom of lesions are well granulated. Assessment:      1. Probable community acquired staphy infection of skin      Plan:      Recommended doxycycline for 10 days and follow up at [facility].         Jeny Hicks MD

## 2019-06-28 ENCOUNTER — OFFICE VISIT (OUTPATIENT)
Dept: INTERNAL MEDICINE | Age: 34
End: 2019-06-28
Payer: COMMERCIAL

## 2019-06-28 VITALS
TEMPERATURE: 98.3 F | WEIGHT: 254 LBS | HEIGHT: 70 IN | RESPIRATION RATE: 12 BRPM | HEART RATE: 88 BPM | BODY MASS INDEX: 36.36 KG/M2 | SYSTOLIC BLOOD PRESSURE: 146 MMHG | DIASTOLIC BLOOD PRESSURE: 87 MMHG

## 2019-06-28 DIAGNOSIS — B95.8 STAPH SKIN INFECTION: Primary | ICD-10-CM

## 2019-06-28 DIAGNOSIS — B96.89 BACTERIAL SKIN INFECTION: ICD-10-CM

## 2019-06-28 DIAGNOSIS — L08.9 STAPH SKIN INFECTION: Primary | ICD-10-CM

## 2019-06-28 DIAGNOSIS — L08.9 BACTERIAL SKIN INFECTION: ICD-10-CM

## 2019-06-28 PROCEDURE — 4004F PT TOBACCO SCREEN RCVD TLK: CPT | Performed by: INTERNAL MEDICINE

## 2019-06-28 PROCEDURE — 99213 OFFICE O/P EST LOW 20 MIN: CPT | Performed by: INTERNAL MEDICINE

## 2019-06-28 PROCEDURE — G8427 DOCREV CUR MEDS BY ELIG CLIN: HCPCS | Performed by: INTERNAL MEDICINE

## 2019-06-28 PROCEDURE — G8417 CALC BMI ABV UP PARAM F/U: HCPCS | Performed by: INTERNAL MEDICINE

## 2019-06-28 RX ORDER — DOXYCYCLINE HYCLATE 100 MG
100 TABLET ORAL 2 TIMES DAILY
Qty: 20 TABLET | Refills: 0 | Status: SHIPPED | OUTPATIENT
Start: 2019-06-28 | End: 2019-06-28 | Stop reason: SDUPTHER

## 2019-06-28 RX ORDER — DOXYCYCLINE HYCLATE 100 MG
100 TABLET ORAL 2 TIMES DAILY
Qty: 20 TABLET | Refills: 0 | Status: SHIPPED | OUTPATIENT
Start: 2019-06-28 | End: 2019-07-08

## 2019-06-28 ASSESSMENT — ENCOUNTER SYMPTOMS
DIARRHEA: 0
WHEEZING: 0
SINUS PAIN: 0
SHORTNESS OF BREATH: 0
CONSTIPATION: 0
SINUS PRESSURE: 0
BLOOD IN STOOL: 0
NAUSEA: 0
TROUBLE SWALLOWING: 0
COUGH: 0
VOMITING: 0

## 2019-06-28 NOTE — PROGRESS NOTES
[facility]  ACC Note      SUBJECTIVE:  CC: had concerns including Skin Lesion (generalized open areas has been tx with no improvement). Anson Petersen presented to the [address] for a routine visit. Presents for 1 month follow-up. Walk in appt today to reassess rash. Reviewed prior notes- seen on 5/30 for skin lesions- thought to be community acquired MRSA. Treated for 10 days doxycycline and states no improvement. Delayed treatment with doxycycline and not consistent with treatment. No noted fevers. No noted chills. No noted diaphoresis or night sweats. States 2 new lesions on abdomen and 2 on buttock. Rash   This is a recurrent problem. The affected locations include the abdomen, torso, right arm, right buttock and left buttock. Pertinent negatives include no anorexia, congestion, cough, diarrhea, facial edema, fatigue, fever, joint pain, shortness of breath or vomiting. Treatments tried: Doxycycline  The treatment provided mild relief. Review of Systems   Constitutional: Negative for activity change, appetite change, chills, diaphoresis, fatigue, fever and unexpected weight change. HENT: Negative for congestion, sinus pressure, sinus pain and trouble swallowing. Respiratory: Negative for cough, shortness of breath and wheezing. Cardiovascular: Negative for chest pain and leg swelling. Gastrointestinal: Negative for anorexia, blood in stool, constipation, diarrhea, nausea and vomiting. Genitourinary: Negative for dysuria and hematuria. Musculoskeletal: Negative for joint pain. Skin: Positive for rash. Neurological: Negative for dizziness, syncope and light-headedness. Hematological: Negative for adenopathy. Does not bruise/bleed easily.        Current Outpatient Medications on File Prior to Visit   Medication Sig Dispense Refill    bumetanide (BUMEX) 1 MG tablet TAKE 1 TABLET BY MOUTH TWICE A DAY  5    FLUoxetine (PROZAC) 20 MG capsule TAKE 1 CAPSULE BY MOUTH EVERY DAY IN THE MORNING  2    KLOR-CON M20 20 MEQ extended release tablet TAKE 1 TABLET BY MOUTH EVERY OTHER DAY  3    losartan (COZAAR) 100 MG tablet TAKE 1 TABLET BY MOUTH EVERY DAY 30 tablet 0    LANTUS SOLOSTAR 100 UNIT/ML injection pen INJECT 35 UNITS INTO THE SKIN NIGHTLY 5 pen 0    minoxidil (LONITEN) 10 MG tablet TAKE 1 TABLET BY MOUTH THREE TIMES A DAY 30 tablet 0    atorvastatin (LIPITOR) 20 MG tablet TAKE 1 TABLET BY MOUTH EVERY DAY 30 tablet 3    glimepiride (AMARYL) 4 MG tablet Take 1 tablet by mouth every morning (before breakfast) 90 tablet 1    LATUDA 20 MG TABS tablet TAKE 1 TABLET BY MOUTH EVERY DAY EVERY AFTERNOON  2    traZODone (DESYREL) 50 MG tablet TAKE 1/2 TABLET BY MOUTH AT BEDTIME  2    ergocalciferol (DRISDOL) 16405 units capsule Take with meal 2x/week (Patient taking differently: once a week Take with meal 2x/week) 8 capsule 3    aspirin 81 MG tablet Take 1 tablet by mouth daily 30 tablet 3    metoprolol succinate (TOPROL XL) 200 MG extended release tablet Take 1 tablet by mouth daily 30 tablet 3    amLODIPine (NORVASC) 10 MG tablet Take 1 tablet by mouth daily 30 tablet 3    amphetamine-dextroamphetamine (ADDERALL) 5 MG tablet TAKE 1 TABLET BY MOUTH TWICE A DAY  0    insulin lispro (HUMALOG) 100 UNIT/ML injection vial Check Blood Sugar daily -largest meal and Cover as follows:  =No Insulin, 151-200=2 units, 201-250=4 units, 251-300=6 units, 301-350=8 units, 351-400=10 units, Over 400 give 10 units and call Physician 1 vial 3    FREESTYLE LANCETS MISC 1 each by Does not apply route daily 400 each 3    blood glucose test strips (FREESTYLE TEST STRIPS) strip Use to check blood glucose four times each day.  400 each 2    Lancets MISC 1 each by Does not apply route 4 times daily 600 each 1    Insulin Pen Needle ([employer] PEN NEEDLES 31G) 31G X 8 MM MISC 1 each by Does not apply route daily 100 each 3    Insulin Syringe-Needle U-100 30G X 1/2\" 1 ML MISC 1 each by Does not apply route daily Use as directed 100 each 11    glucagon 1 MG injection Infuse 1 mg intravenously once for 1 dose Administer 1 mg under skin if blood sugars go <70 with in ability to take glucose tablets 1 kit 3    sodium bicarbonate 650 MG tablet Take 1 tablet by mouth 3 times daily 90 tablet 1    glucose monitoring kit (FREESTYLE) monitoring kit Use once. 1 kit 0    Blood Pressure Monitoring (ADULT BLOOD PRESSURE CUFF LG) KIT 1 kit by Does not apply route daily 1 kit 0     No current facility-administered medications on file prior to visit. I have reviewed all pertinent PMHx, PSHx, FamHx, SocialHx,medications, and allergies and updated history as appropriate. OBJECTIVE:    VS: BP (!) 146/87   Pulse 88   Temp 98.3 °F (36.8 °C) (Oral)   Resp 12   Ht 5' 10\" (1.778 m)   Wt 254 lb (115.2 kg)   BMI 36.45 kg/m²   Physical Exam   Constitutional: He is oriented to person, place, and time. He appears well-developed and well-nourished. No distress. Cardiovascular: Normal rate, regular rhythm, normal heart sounds and intact distal pulses. Exam reveals no friction rub. No murmur heard. Pulmonary/Chest: Effort normal and breath sounds normal. He has no wheezes. He has no rales. Abdominal: Soft. Bowel sounds are normal. There is no tenderness. obese   Musculoskeletal: He exhibits no edema. Neurological: He is alert and oriented to person, place, and time. Skin: Lesion (3 noted on abdomen; scarring on RUE; scarrin on Left torso; 2 lesions on right/left buttuck ) noted. He is not diaphoretic. Multiple lesions as noted:  1) healing/scarring of RUE/Lt Torso;   2)  Noted scabbing of 3 small abdominal lesions without significant induration/no drainage/no fluctuance or palpable abscess  3) 2 lesions on buttock;  Left is larger; granulation developing underneath; no significant fluctuance; non-draining in nature; ASSESSMENT/PLAN:  Shira Snow was seen today for skin lesion. Diagnoses and all orders for this visit:    Staph skin infection  -     ([facility]) - Kirsten Ortega MD, Infectious Disease, [place] ([place])    Bacterial skin infection  -     doxycycline hyclate (VIBRA-TABS) 100 MG tablet; Take 1 tablet by mouth 2 times daily for 10 days  -     ([facility]) - Kirsten Ortega MD, Infectious Disease, [place] ([place])    1) agree with prior assessment- consistent with Likely staph infection/furnucles noted on exam- different staging- 2 areas are healing; no active drainage; no systemic symptoms. Discussed repeat treatment with empiric anti-staphylococcal agent- repeat Doxy use- discussed appropriate administration and potential side effects including reducing risk for sun sensitivity and pill esophagitis. Cannot use bactrim due to CKD; would not trial clindamycin due to low yield for community acquired MRSA; no culture given no active drainage; no significant abscess formation to discuss I & D. No noted Drug to drug interactions with doxy use; tolerated previously. 2) Recommend ID eval given recurrence; consider mrsa colonization treatment after completion of doxy; need to be more consistent with use; recommend antibacterial soap usage- DIAL vs. Short term hibiclens use as discussed. All questions answered. RTC: maintain appt.      I have reviewed my findings and recommendations with Farhana Nunn MD, FACP   6/28/2019 9:24 AM

## 2019-07-01 DIAGNOSIS — I10 ESSENTIAL HYPERTENSION: ICD-10-CM

## 2019-07-03 RX ORDER — ASPIRIN 81 MG/1
TABLET ORAL
Qty: 30 TABLET | Refills: 3 | Status: SHIPPED | OUTPATIENT
Start: 2019-07-03 | End: 2019-10-23 | Stop reason: SDUPTHER

## 2019-07-03 RX ORDER — METOPROLOL SUCCINATE 200 MG/1
TABLET, EXTENDED RELEASE ORAL
Qty: 30 TABLET | Refills: 3 | Status: SHIPPED | OUTPATIENT
Start: 2019-07-03 | End: 2019-10-26 | Stop reason: SDUPTHER

## 2019-07-14 DIAGNOSIS — E78.5 HYPERLIPIDEMIA, UNSPECIFIED HYPERLIPIDEMIA TYPE: ICD-10-CM

## 2019-07-17 RX ORDER — ATORVASTATIN CALCIUM 20 MG/1
TABLET, FILM COATED ORAL
Qty: 30 TABLET | Refills: 3 | Status: SHIPPED | OUTPATIENT
Start: 2019-07-17 | End: 2020-01-17 | Stop reason: SDUPTHER

## 2019-07-26 ENCOUNTER — TELEPHONE (OUTPATIENT)
Dept: ENDOCRINOLOGY | Age: 34
End: 2019-07-26

## 2019-07-27 DIAGNOSIS — I10 ESSENTIAL HYPERTENSION: ICD-10-CM

## 2019-07-29 RX ORDER — AMLODIPINE BESYLATE 10 MG/1
TABLET ORAL
Qty: 30 TABLET | Refills: 1 | Status: SHIPPED | OUTPATIENT
Start: 2019-07-29 | End: 2019-09-01 | Stop reason: SDUPTHER

## 2019-07-29 RX ORDER — GLIMEPIRIDE 4 MG/1
TABLET ORAL
Qty: 30 TABLET | Refills: 5 | Status: SHIPPED | OUTPATIENT
Start: 2019-07-29 | End: 2020-01-17 | Stop reason: SDUPTHER

## 2019-08-01 DIAGNOSIS — E10.11 TYPE 1 DIABETES MELLITUS WITH KETOACIDOTIC COMA (HCC): Primary | ICD-10-CM

## 2019-09-01 DIAGNOSIS — I10 ESSENTIAL HYPERTENSION: ICD-10-CM

## 2019-09-04 RX ORDER — AMLODIPINE BESYLATE 10 MG/1
TABLET ORAL
Qty: 30 TABLET | Refills: 0 | Status: SHIPPED | OUTPATIENT
Start: 2019-09-04 | End: 2019-10-21 | Stop reason: SDUPTHER

## 2019-10-04 ENCOUNTER — HOSPITAL ENCOUNTER (OUTPATIENT)
Age: 34
Discharge: HOME | End: 2019-10-04
Payer: COMMERCIAL

## 2019-10-04 LAB
ANION GAP SERPL CALCULATED.3IONS-SCNC: 17 MMOL/L (ref 7–16)
BASOPHILS ABSOLUTE: 0.05 E9/L (ref 0–0.2)
BASOPHILS RELATIVE PERCENT: 0.9 % (ref 0–2)
BUN BLDV-MCNC: 31 MG/DL (ref 6–20)
CALCIUM SERPL-MCNC: 9.5 MG/DL (ref 8.6–10.2)
CHLORIDE BLD-SCNC: 93 MMOL/L (ref 98–107)
CHOLESTEROL, TOTAL: 177 MG/DL (ref 0–199)
CO2: 21 MMOL/L (ref 22–29)
CREAT SERPL-MCNC: 2.1 MG/DL (ref 0.7–1.2)
EOSINOPHILS ABSOLUTE: 0.17 E9/L (ref 0.05–0.5)
EOSINOPHILS RELATIVE PERCENT: 2.9 % (ref 0–6)
GFR AFRICAN AMERICAN: 44
GFR NON-AFRICAN AMERICAN: 36 ML/MIN/1.73
GLUCOSE BLD-MCNC: 483 MG/DL (ref 74–99)
HCT VFR BLD CALC: 45.7 % (ref 37–54)
HDLC SERPL-MCNC: 23 MG/DL
HEMOGLOBIN: 16 G/DL (ref 12.5–16.5)
IMMATURE GRANULOCYTES #: 0.02 E9/L
IMMATURE GRANULOCYTES %: 0.3 % (ref 0–5)
LDL CHOLESTEROL CALCULATED: ABNORMAL MG/DL (ref 0–99)
LYMPHOCYTES ABSOLUTE: 1.58 E9/L (ref 1.5–4)
LYMPHOCYTES RELATIVE PERCENT: 27 % (ref 20–42)
MCH RBC QN AUTO: 31.1 PG (ref 26–35)
MCHC RBC AUTO-ENTMCNC: 35 % (ref 32–34.5)
MCV RBC AUTO: 88.7 FL (ref 80–99.9)
MONOCYTES ABSOLUTE: 0.41 E9/L (ref 0.1–0.95)
MONOCYTES RELATIVE PERCENT: 7 % (ref 2–12)
NEUTROPHILS ABSOLUTE: 3.63 E9/L (ref 1.8–7.3)
NEUTROPHILS RELATIVE PERCENT: 61.9 % (ref 43–80)
PDW BLD-RTO: 12.8 FL (ref 11.5–15)
PHOSPHORUS: 3.6 MG/DL (ref 2.5–4.5)
PLATELET # BLD: 157 E9/L (ref 130–450)
PMV BLD AUTO: 12.5 FL (ref 7–12)
POTASSIUM SERPL-SCNC: 4 MMOL/L (ref 3.5–5)
RBC # BLD: 5.15 E12/L (ref 3.8–5.8)
SODIUM BLD-SCNC: 131 MMOL/L (ref 132–146)
TRIGL SERPL-MCNC: 460 MG/DL (ref 0–149)
VLDLC SERPL CALC-MCNC: ABNORMAL MG/DL
WBC # BLD: 5.9 E9/L (ref 4.5–11.5)

## 2019-10-04 PROCEDURE — 80061 LIPID PANEL: CPT

## 2019-10-04 PROCEDURE — 85025 COMPLETE CBC W/AUTO DIFF WBC: CPT

## 2019-10-04 PROCEDURE — 80048 BASIC METABOLIC PNL TOTAL CA: CPT

## 2019-10-04 PROCEDURE — 36415 COLL VENOUS BLD VENIPUNCTURE: CPT

## 2019-10-04 PROCEDURE — 84100 ASSAY OF PHOSPHORUS: CPT

## 2019-10-21 DIAGNOSIS — I10 ESSENTIAL HYPERTENSION: ICD-10-CM

## 2019-10-21 RX ORDER — AMLODIPINE BESYLATE 10 MG/1
TABLET ORAL
Qty: 30 TABLET | Refills: 0 | Status: SHIPPED | OUTPATIENT
Start: 2019-10-21 | End: 2019-11-17 | Stop reason: SDUPTHER

## 2019-10-23 DIAGNOSIS — I10 ESSENTIAL HYPERTENSION: ICD-10-CM

## 2019-10-24 RX ORDER — ASPIRIN 81 MG/1
TABLET, COATED ORAL
Qty: 30 TABLET | Refills: 0 | Status: SHIPPED
Start: 2019-10-24 | End: 2020-04-13

## 2019-10-26 DIAGNOSIS — I10 ESSENTIAL HYPERTENSION: ICD-10-CM

## 2019-10-29 RX ORDER — METOPROLOL SUCCINATE 200 MG/1
TABLET, EXTENDED RELEASE ORAL
Qty: 30 TABLET | Refills: 2 | Status: SHIPPED
Start: 2019-10-29 | End: 2020-04-13

## 2019-11-08 ENCOUNTER — TELEPHONE (OUTPATIENT)
Dept: INTERNAL MEDICINE | Age: 34
End: 2019-11-08

## 2019-11-17 DIAGNOSIS — I10 ESSENTIAL HYPERTENSION: ICD-10-CM

## 2019-11-19 RX ORDER — AMLODIPINE BESYLATE 10 MG/1
TABLET ORAL
Qty: 30 TABLET | Refills: 0 | Status: ON HOLD
Start: 2019-11-19 | End: 2022-08-02 | Stop reason: HOSPADM

## 2020-01-17 ENCOUNTER — OFFICE VISIT (OUTPATIENT)
Dept: ENDOCRINOLOGY | Age: 35
End: 2020-01-17
Payer: COMMERCIAL

## 2020-01-17 ENCOUNTER — HOSPITAL ENCOUNTER (OUTPATIENT)
Age: 35
Discharge: HOME | End: 2020-01-17
Payer: COMMERCIAL

## 2020-01-17 VITALS
DIASTOLIC BLOOD PRESSURE: 85 MMHG | BODY MASS INDEX: 29.58 KG/M2 | SYSTOLIC BLOOD PRESSURE: 137 MMHG | HEIGHT: 70 IN | HEART RATE: 79 BPM | RESPIRATION RATE: 16 BRPM | WEIGHT: 206.6 LBS

## 2020-01-17 LAB
ALBUMIN SERPL-MCNC: 4.2 G/DL (ref 3.5–5.2)
ALP BLD-CCNC: 75 U/L (ref 40–129)
ALT SERPL-CCNC: 25 U/L (ref 0–40)
ANION GAP SERPL CALCULATED.3IONS-SCNC: 16 MMOL/L (ref 7–16)
AST SERPL-CCNC: 13 U/L (ref 0–39)
BASOPHILS ABSOLUTE: 0.05 E9/L (ref 0–0.2)
BASOPHILS RELATIVE PERCENT: 0.7 % (ref 0–2)
BETA-HYDROXYBUTYRATE: 0.68 MMOL/L (ref 0.02–0.27)
BILIRUB SERPL-MCNC: 0.7 MG/DL (ref 0–1.2)
BILIRUBIN URINE: NEGATIVE
BLOOD, URINE: NEGATIVE
BUN BLDV-MCNC: 21 MG/DL (ref 6–20)
CALCIUM SERPL-MCNC: 10 MG/DL (ref 8.6–10.2)
CHLORIDE BLD-SCNC: 102 MMOL/L (ref 98–107)
CHOLESTEROL, TOTAL: 203 MG/DL (ref 0–199)
CLARITY: CLEAR
CO2: 24 MMOL/L (ref 22–29)
COLOR: YELLOW
CREAT SERPL-MCNC: 1.8 MG/DL (ref 0.7–1.2)
CREATININE URINE: 57 MG/DL (ref 40–278)
EOSINOPHILS ABSOLUTE: 0.12 E9/L (ref 0.05–0.5)
EOSINOPHILS RELATIVE PERCENT: 1.8 % (ref 0–6)
GFR AFRICAN AMERICAN: 52
GFR NON-AFRICAN AMERICAN: 43 ML/MIN/1.73
GLUCOSE BLD-MCNC: 381 MG/DL (ref 74–99)
GLUCOSE URINE: >=1000 MG/DL
HCT VFR BLD CALC: 43.8 % (ref 37–54)
HDLC SERPL-MCNC: 33 MG/DL
HEMOGLOBIN: 14 G/DL (ref 12.5–16.5)
IMMATURE GRANULOCYTES #: 0.02 E9/L
IMMATURE GRANULOCYTES %: 0.3 % (ref 0–5)
KETONES, URINE: NEGATIVE MG/DL
LDL CHOLESTEROL CALCULATED: 140 MG/DL (ref 0–99)
LEUKOCYTE ESTERASE, URINE: NEGATIVE
LYMPHOCYTES ABSOLUTE: 1.54 E9/L (ref 1.5–4)
LYMPHOCYTES RELATIVE PERCENT: 22.5 % (ref 20–42)
MCH RBC QN AUTO: 30.7 PG (ref 26–35)
MCHC RBC AUTO-ENTMCNC: 32 % (ref 32–34.5)
MCV RBC AUTO: 96.1 FL (ref 80–99.9)
MICROALBUMIN UR-MCNC: 100.1 MG/L
MICROALBUMIN/CREAT UR-RTO: 175.6 (ref 0–30)
MONOCYTES ABSOLUTE: 0.47 E9/L (ref 0.1–0.95)
MONOCYTES RELATIVE PERCENT: 6.9 % (ref 2–12)
NEUTROPHILS ABSOLUTE: 4.64 E9/L (ref 1.8–7.3)
NEUTROPHILS RELATIVE PERCENT: 67.8 % (ref 43–80)
NITRITE, URINE: NEGATIVE
PDW BLD-RTO: 12.9 FL (ref 11.5–15)
PH UA: 5 (ref 5–9)
PLATELET # BLD: 206 E9/L (ref 130–450)
PMV BLD AUTO: 11.5 FL (ref 7–12)
POTASSIUM SERPL-SCNC: 4.1 MMOL/L (ref 3.5–5)
PROTEIN UA: NEGATIVE MG/DL
RBC # BLD: 4.56 E12/L (ref 3.8–5.8)
SODIUM BLD-SCNC: 142 MMOL/L (ref 132–146)
SPECIFIC GRAVITY UA: 1.01 (ref 1–1.03)
TOTAL PROTEIN: 7.1 G/DL (ref 6.4–8.3)
TRIGL SERPL-MCNC: 149 MG/DL (ref 0–149)
UROBILINOGEN, URINE: 0.2 E.U./DL
VLDLC SERPL CALC-MCNC: 30 MG/DL
WBC # BLD: 6.8 E9/L (ref 4.5–11.5)

## 2020-01-17 PROCEDURE — 3046F HEMOGLOBIN A1C LEVEL >9.0%: CPT | Performed by: INTERNAL MEDICINE

## 2020-01-17 PROCEDURE — 80053 COMPREHEN METABOLIC PANEL: CPT

## 2020-01-17 PROCEDURE — 99212 OFFICE O/P EST SF 10 MIN: CPT | Performed by: INTERNAL MEDICINE

## 2020-01-17 PROCEDURE — G8484 FLU IMMUNIZE NO ADMIN: HCPCS | Performed by: INTERNAL MEDICINE

## 2020-01-17 PROCEDURE — 81003 URINALYSIS AUTO W/O SCOPE: CPT

## 2020-01-17 PROCEDURE — 80061 LIPID PANEL: CPT

## 2020-01-17 PROCEDURE — 4004F PT TOBACCO SCREEN RCVD TLK: CPT | Performed by: INTERNAL MEDICINE

## 2020-01-17 PROCEDURE — 36415 COLL VENOUS BLD VENIPUNCTURE: CPT

## 2020-01-17 PROCEDURE — 85025 COMPLETE CBC W/AUTO DIFF WBC: CPT

## 2020-01-17 PROCEDURE — 99215 OFFICE O/P EST HI 40 MIN: CPT | Performed by: INTERNAL MEDICINE

## 2020-01-17 PROCEDURE — 82044 UR ALBUMIN SEMIQUANTITATIVE: CPT

## 2020-01-17 PROCEDURE — G8417 CALC BMI ABV UP PARAM F/U: HCPCS | Performed by: INTERNAL MEDICINE

## 2020-01-17 PROCEDURE — 82010 KETONE BODYS QUAN: CPT

## 2020-01-17 PROCEDURE — 2022F DILAT RTA XM EVC RTNOPTHY: CPT | Performed by: INTERNAL MEDICINE

## 2020-01-17 PROCEDURE — G8427 DOCREV CUR MEDS BY ELIG CLIN: HCPCS | Performed by: INTERNAL MEDICINE

## 2020-01-17 PROCEDURE — 83036 HEMOGLOBIN GLYCOSYLATED A1C: CPT

## 2020-01-17 PROCEDURE — 82570 ASSAY OF URINE CREATININE: CPT

## 2020-01-17 RX ORDER — GLIMEPIRIDE 4 MG/1
4 TABLET ORAL
Qty: 30 TABLET | Refills: 5 | Status: SHIPPED
Start: 2020-01-17 | End: 2020-03-29

## 2020-01-17 RX ORDER — ATORVASTATIN CALCIUM 20 MG/1
20 TABLET, FILM COATED ORAL DAILY
Qty: 90 TABLET | Refills: 3 | Status: ON HOLD
Start: 2020-01-17 | End: 2022-08-02 | Stop reason: SDUPTHER

## 2020-01-17 NOTE — PROGRESS NOTES
CC -   Diabetes (type uncertain), Hyperglycemia    Background -   Pt returns for follow up of last visit with me on 05/24/2019    A1c  too high for machine to read 1/17/20, 7.1% 3/29/19  Fingersticks: none (the few times he has checked it, the glucometer reads \"high\")  Hypoglycemia: none  Regimen: Glimepiride 4 mg daily (stopped for between Thanksgiving and Jan 1); has not taken any insulin  Eyes:       Sept 2018, no DR  Kidneys:   10/4/19  BUN/Cr 31/2.1,  GFR 44  UACR:       9/2018 urine Protein 24  BP:            +HTN, 137/85, Controlled with Norvasc, Toprol, Minoxidil, Bumex, Losartan  Statin: Atorvastatin 20mg  Ace-I: Losartan 100mg  Feet:  1/17/20  Good monofilament sensation b/l, no calluses, ulcers or red spots b/l  ______________________    [facility] -  Medical problems: IDDM (type uncertain, 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), HTN, ADHD, Bipolar  Medications:  Glimepiride,  Norvasc, Toprol, Minoxidil, Bumex, Losartan, Atorvastatin, Adderall, Latuda    ROS -  Card - no CP  GI - + N/V (but just a couple of times a 2-3 weeks ago)    PE -  Gen : /85 (Site: Right Upper Arm, Position: Sitting, Cuff Size: Medium Adult)   Pulse 79   Resp 16   Ht 5' 10\" (1.778 m)   Wt 206 lb 9.6 oz (93.7 kg)   BMI 29.64 kg/m²    WN, WD, NAD  Card:   RRR with MGR  Lung: Nml resp effort   CTA b/l   Abd: Soft   NT/ND  Psych: Normal mood   Full affect  Neuro: Moves all ext well  Feet:   1/17/20, Good monofilament sensation b/l, no calluses, ulcers or red spots b/l  ______________________      HPI & A/P -   Problem 1  - Diabetes (type uncertain, + antibodies)  HPI   - See Background above for summary of care      Pt has not taken his MORA for 6 week period. Restarted glimepiride two weeks ago      Has not been checking BG.  Reading in clinic today is 473 mg/dl (fasting)      He is severely insulin resistant from glucotoxicity      Denies symptoms other than weight loss      Needs CMP, CBC, Beta-hydroxybutyric Acid, Urinalysis (for ketones), Urine for

## 2020-01-17 NOTE — PATIENT INSTRUCTIONS
Give Reg Insulin 10 units in clinic now  Go to lab afterward for blood work and urine tests  Begin Lantus 20 units daily. Check the BG before each meal and at bedtime  Every three days, average the fasting BG. If the avg is >180 mg/dl, then increase the Lantus dose by 5 units.   Take 5 units of Humalog anytime the BG reading is >300 mg/dl (but no more freq than every 4 hours)  Cont glimepiride 4 mg yousuf   Have an annual eye exam performed    See me back in two months    Dr. Tree Cosby Cell:  [phone]

## 2020-01-19 ENCOUNTER — TELEPHONE (OUTPATIENT)
Dept: BARIATRICS/WEIGHT MGMT | Age: 35
End: 2020-01-19

## 2020-01-20 LAB — HBA1C MFR BLD: >16.5 %

## 2020-02-09 ENCOUNTER — TELEPHONE (OUTPATIENT)
Dept: ENDOCRINOLOGY | Age: 35
End: 2020-02-09

## 2020-02-28 ENCOUNTER — OFFICE VISIT (OUTPATIENT)
Dept: ENDOCRINOLOGY | Age: 35
End: 2020-02-28
Payer: COMMERCIAL

## 2020-02-28 VITALS
WEIGHT: 220.8 LBS | SYSTOLIC BLOOD PRESSURE: 170 MMHG | RESPIRATION RATE: 18 BRPM | BODY MASS INDEX: 31.61 KG/M2 | HEART RATE: 81 BPM | DIASTOLIC BLOOD PRESSURE: 114 MMHG | HEIGHT: 70 IN

## 2020-02-28 PROCEDURE — G8417 CALC BMI ABV UP PARAM F/U: HCPCS | Performed by: INTERNAL MEDICINE

## 2020-02-28 PROCEDURE — G8484 FLU IMMUNIZE NO ADMIN: HCPCS | Performed by: INTERNAL MEDICINE

## 2020-02-28 PROCEDURE — 99213 OFFICE O/P EST LOW 20 MIN: CPT | Performed by: INTERNAL MEDICINE

## 2020-02-28 PROCEDURE — 2022F DILAT RTA XM EVC RTNOPTHY: CPT | Performed by: INTERNAL MEDICINE

## 2020-02-28 PROCEDURE — 3046F HEMOGLOBIN A1C LEVEL >9.0%: CPT | Performed by: INTERNAL MEDICINE

## 2020-02-28 PROCEDURE — G8427 DOCREV CUR MEDS BY ELIG CLIN: HCPCS | Performed by: INTERNAL MEDICINE

## 2020-02-28 PROCEDURE — 99212 OFFICE O/P EST SF 10 MIN: CPT | Performed by: INTERNAL MEDICINE

## 2020-02-28 PROCEDURE — 4004F PT TOBACCO SCREEN RCVD TLK: CPT | Performed by: INTERNAL MEDICINE

## 2020-02-28 NOTE — PATIENT INSTRUCTIONS
Check BG before breakfast, before dinner and before bedtime  Check the BG before each meal and at bedtime  Take Lantus 30 units daily  Take 5 units of Humalog anytime the BG reading is >300 mg/dl (but no more freq than every 4 hours)  Cont glimepiride 4 mg yousuf  Have an annual eye exam performed    See me back in 1 month    Dr. Lorna Spence Cell:  [phone]

## 2020-03-27 RX ORDER — METOPROLOL SUCCINATE 200 MG/1
TABLET, EXTENDED RELEASE ORAL
Qty: 30 TABLET | Refills: 3 | OUTPATIENT
Start: 2020-03-27

## 2020-03-27 RX ORDER — METOPROLOL SUCCINATE 200 MG/1
TABLET, EXTENDED RELEASE ORAL
Qty: 30 TABLET | Refills: 2 | OUTPATIENT
Start: 2020-03-27

## 2020-03-27 RX ORDER — ASPIRIN 81 MG/1
TABLET, COATED ORAL
Qty: 30 TABLET | Refills: 3 | OUTPATIENT
Start: 2020-03-27

## 2020-03-27 NOTE — TELEPHONE ENCOUNTER
Last Appointment:    Future Appointments   Date Time Provider Lucio Romero   4/10/2020  9:20 AM Denver Holm, MD [facility]

## 2020-03-29 RX ORDER — GLIMEPIRIDE 4 MG/1
TABLET ORAL
Qty: 30 TABLET | Refills: 5 | Status: SHIPPED
Start: 2020-03-29 | End: 2020-09-26

## 2020-04-13 RX ORDER — ASPIRIN 81 MG/1
TABLET, COATED ORAL
Qty: 30 TABLET | Refills: 3 | Status: ON HOLD
Start: 2020-04-13 | End: 2022-08-02 | Stop reason: SDUPTHER

## 2020-04-13 RX ORDER — METOPROLOL SUCCINATE 200 MG/1
TABLET, EXTENDED RELEASE ORAL
Qty: 30 TABLET | Refills: 3 | Status: ON HOLD
Start: 2020-04-13 | End: 2022-08-02 | Stop reason: SDUPTHER

## 2020-04-13 RX ORDER — METOPROLOL SUCCINATE 200 MG/1
TABLET, EXTENDED RELEASE ORAL
Qty: 30 TABLET | Refills: 2 | OUTPATIENT
Start: 2020-04-13

## 2020-06-16 ENCOUNTER — HOSPITAL ENCOUNTER (OUTPATIENT)
Age: 35
Discharge: HOME | End: 2020-06-16
Payer: COMMERCIAL

## 2020-06-16 LAB
ANION GAP SERPL CALCULATED.3IONS-SCNC: 12 MMOL/L (ref 7–16)
BASOPHILS ABSOLUTE: 0.03 E9/L (ref 0–0.2)
BASOPHILS RELATIVE PERCENT: 0.5 % (ref 0–2)
BUN BLDV-MCNC: 20 MG/DL (ref 6–20)
CALCIUM SERPL-MCNC: 9 MG/DL (ref 8.6–10.2)
CHLORIDE BLD-SCNC: 105 MMOL/L (ref 98–107)
CO2: 22 MMOL/L (ref 22–29)
CREAT SERPL-MCNC: 1.9 MG/DL (ref 0.7–1.2)
EOSINOPHILS ABSOLUTE: 0.15 E9/L (ref 0.05–0.5)
EOSINOPHILS RELATIVE PERCENT: 2.6 % (ref 0–6)
GFR AFRICAN AMERICAN: 49
GFR NON-AFRICAN AMERICAN: 40 ML/MIN/1.73
GLUCOSE BLD-MCNC: 262 MG/DL (ref 74–99)
HBA1C MFR BLD: 11.7 % (ref 4–5.6)
HCT VFR BLD CALC: 42.7 % (ref 37–54)
HEMOGLOBIN: 14.4 G/DL (ref 12.5–16.5)
IMMATURE GRANULOCYTES #: 0.01 E9/L
IMMATURE GRANULOCYTES %: 0.2 % (ref 0–5)
LYMPHOCYTES ABSOLUTE: 1.72 E9/L (ref 1.5–4)
LYMPHOCYTES RELATIVE PERCENT: 30.2 % (ref 20–42)
MCH RBC QN AUTO: 31.5 PG (ref 26–35)
MCHC RBC AUTO-ENTMCNC: 33.7 % (ref 32–34.5)
MCV RBC AUTO: 93.4 FL (ref 80–99.9)
MONOCYTES ABSOLUTE: 0.38 E9/L (ref 0.1–0.95)
MONOCYTES RELATIVE PERCENT: 6.7 % (ref 2–12)
NEUTROPHILS ABSOLUTE: 3.41 E9/L (ref 1.8–7.3)
NEUTROPHILS RELATIVE PERCENT: 59.8 % (ref 43–80)
PARATHYROID HORMONE INTACT: 117 PG/ML (ref 15–65)
PDW BLD-RTO: 13.2 FL (ref 11.5–15)
PHOSPHORUS: 2.3 MG/DL (ref 2.5–4.5)
PLATELET # BLD: 189 E9/L (ref 130–450)
PMV BLD AUTO: 11.4 FL (ref 7–12)
POTASSIUM SERPL-SCNC: 3.8 MMOL/L (ref 3.5–5)
RBC # BLD: 4.57 E12/L (ref 3.8–5.8)
SODIUM BLD-SCNC: 139 MMOL/L (ref 132–146)
VITAMIN D 25-HYDROXY: 20 NG/ML (ref 30–100)
WBC # BLD: 5.7 E9/L (ref 4.5–11.5)

## 2020-06-16 PROCEDURE — 83970 ASSAY OF PARATHORMONE: CPT

## 2020-06-16 PROCEDURE — 80048 BASIC METABOLIC PNL TOTAL CA: CPT

## 2020-06-16 PROCEDURE — 85025 COMPLETE CBC W/AUTO DIFF WBC: CPT

## 2020-06-16 PROCEDURE — 84100 ASSAY OF PHOSPHORUS: CPT

## 2020-06-16 PROCEDURE — 36415 COLL VENOUS BLD VENIPUNCTURE: CPT

## 2020-06-16 PROCEDURE — 83036 HEMOGLOBIN GLYCOSYLATED A1C: CPT

## 2020-06-16 PROCEDURE — 82306 VITAMIN D 25 HYDROXY: CPT

## 2020-08-04 RX ORDER — METOPROLOL SUCCINATE 200 MG/1
TABLET, EXTENDED RELEASE ORAL
Qty: 30 TABLET | Refills: 3 | OUTPATIENT
Start: 2020-08-04

## 2020-09-25 NOTE — TELEPHONE ENCOUNTER
Name of Medication(s) Requested:  [facility]    Pharmacy Requested:   [facility]    Medication(s) pended? [x] Yes  [] No    Last Appointment:  2/28/2020    Future appts:  No future appointments. Does patient need call back?   [] Yes  [x] No

## 2020-09-26 RX ORDER — GLIMEPIRIDE 4 MG/1
TABLET ORAL
Qty: 30 TABLET | Refills: 5 | Status: SHIPPED
Start: 2020-09-26 | End: 2021-05-18

## 2020-09-30 ENCOUNTER — TELEPHONE (OUTPATIENT)
Dept: INTERNAL MEDICINE | Age: 35
End: 2020-09-30

## 2020-09-30 NOTE — TELEPHONE ENCOUNTER
Left message informed needs an appointment before we can refill medications last seen in clinic 11/2019

## 2020-10-08 ENCOUNTER — TELEPHONE (OUTPATIENT)
Dept: ADMINISTRATIVE | Age: 35
End: 2020-10-08

## 2020-10-08 NOTE — TELEPHONE ENCOUNTER
Pt called to request an appt w/ Dr Jonathon Bridges at [address] [facility] office, he left a VM on [facility] msg line. [facility] returned call, no answer, lvm for pt to call back.

## 2021-02-11 ENCOUNTER — OFFICE VISIT (OUTPATIENT)
Dept: ENDOCRINOLOGY | Age: 36
End: 2021-02-11
Payer: COMMERCIAL

## 2021-02-11 VITALS
TEMPERATURE: 97.8 F | HEART RATE: 90 BPM | OXYGEN SATURATION: 98 % | WEIGHT: 213 LBS | BODY MASS INDEX: 30.56 KG/M2 | SYSTOLIC BLOOD PRESSURE: 142 MMHG | DIASTOLIC BLOOD PRESSURE: 86 MMHG

## 2021-02-11 DIAGNOSIS — E10.11 TYPE 1 DIABETES MELLITUS WITH KETOACIDOTIC COMA (HCC): Primary | ICD-10-CM

## 2021-02-11 LAB — HBA1C MFR BLD: 11.4 %

## 2021-02-11 PROCEDURE — 2022F DILAT RTA XM EVC RTNOPTHY: CPT | Performed by: INTERNAL MEDICINE

## 2021-02-11 PROCEDURE — 83036 HEMOGLOBIN GLYCOSYLATED A1C: CPT | Performed by: INTERNAL MEDICINE

## 2021-02-11 PROCEDURE — 3046F HEMOGLOBIN A1C LEVEL >9.0%: CPT | Performed by: INTERNAL MEDICINE

## 2021-02-11 PROCEDURE — G8484 FLU IMMUNIZE NO ADMIN: HCPCS | Performed by: INTERNAL MEDICINE

## 2021-02-11 PROCEDURE — 4004F PT TOBACCO SCREEN RCVD TLK: CPT | Performed by: INTERNAL MEDICINE

## 2021-02-11 PROCEDURE — G8427 DOCREV CUR MEDS BY ELIG CLIN: HCPCS | Performed by: INTERNAL MEDICINE

## 2021-02-11 PROCEDURE — 99214 OFFICE O/P EST MOD 30 MIN: CPT | Performed by: INTERNAL MEDICINE

## 2021-02-11 PROCEDURE — G8417 CALC BMI ABV UP PARAM F/U: HCPCS | Performed by: INTERNAL MEDICINE

## 2021-02-11 RX ORDER — INSULIN GLARGINE 100 [IU]/ML
30 INJECTION, SOLUTION SUBCUTANEOUS NIGHTLY
Qty: 5 PEN | Refills: 3 | Status: ON HOLD
Start: 2021-02-11 | End: 2022-08-02 | Stop reason: SDUPTHER

## 2021-02-11 RX ORDER — INSULIN LISPRO 100 [IU]/ML
INJECTION, SOLUTION INTRAVENOUS; SUBCUTANEOUS
Qty: 2 PEN | Refills: 3 | Status: ON HOLD
Start: 2021-02-11 | End: 2022-08-02 | Stop reason: SDUPTHER

## 2021-02-11 NOTE — PATIENT INSTRUCTIONS
Check the BG before each meal and at bedtime  Take Lantus 30 units daily  Take 5 units of Humalog anytime the BG reading is >300 mg/dl (but no more freq than every 4 hours)  Cont glimepiride 4 mg dailiy  Have an annual eye exam performed  Have a urine test for protein done    See your Primary Care Physician    See me back in 1 month    Dr. Elizabeth Wolf Cell:  [phone]

## 2021-02-11 NOTE — PROGRESS NOTES
CC -   Diabetes (type uncertain), Hyperglycemia    Background -   Pt returns for follow up of last visit with me on 02/28/2020    A1c  11.4% 2/11/21, too high for machine to read 1/17/20, 7.1% 3/29/19  BG:  Has not been using a glucometer since December b/c the battery went dead; he did not bring the meter  Hypoglycemia: 1x/month  Regimen: Glimepiride 4 mg daily, has been trying to ration his last pen of insulin that was opened in August (expires after 30 days)  Eyes:       Sept 2018, no DR (did not yet have his eye exam performed)  Kidneys:   6/16/20 BUN/Cr 20/1.9, GFR 49  UACR:      1/17/20 175.6  BP:           +HTN, 142/86, Typically controlled with Norvasc, Toprol, Minoxidil, Bumex, Losartan; did not take any of them today  Statin: Atorvastatin 20mg  Ace-I: Losartan 100mg  Feet:  1/17/20  Good monofilament sensation b/l, no calluses, ulcers or red spots b/l  ______________________    [facility]  Medical problems: IDDM (type uncertain, 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), HTN, ADHD, Bipolar  Current Outpatient Medications   Medication Sig Dispense Refill    glimepiride (AMARYL) 4 MG tablet TAKE 1 TABLET BY MOUTH EVERY DAY BEFORE BREAKFAST 30 tablet 5    glucagon 1 MG injection  to inject 1 mg into the muscle or under the skin if you become unconscious for any reason other than suspected high BG 1 kit 3    ASPIRIN LOW DOSE 81 MG EC tablet TAKE 1 TABLET BY MOUTH EVERY DAY 30 tablet 3    metoprolol succinate (TOPROL XL) 200 MG extended release tablet TAKE 1 TABLET BY MOUTH EVERY DAY 30 tablet 3    insulin glargine (LANTUS SOLOSTAR) 100 UNIT/ML injection pen Inject 30 Units into the skin nightly (Patient taking differently: Inject 30 Units into the skin nightly Ran out last summer) 5 pen 2    atorvastatin (LIPITOR) 20 MG tablet Take 1 tablet by mouth daily 90 tablet 3    Insulin Syringe-Needle U-100 30G X 1/2\" 1 ML MISC 1 each by Does not apply route daily Use as directed 100 each 11    Insulin Pen Needle 32G X 4 MM MISC Use to take long acting insulin once each day 100 each 5    glucose ([facility] GLUCOSE) 4 g chewable tablet Take 4 tablets by mouth as needed for Low blood sugar (<70 mg/dl) every 15 minutes until BG rises to >70mg/dl 60 tablet 3    amLODIPine (NORVASC) 10 MG tablet TAKE 1 TABLET BY MOUTH EVERY DAY 30 tablet 0    blood glucose test strips (FREESTYLE LITE) strip USE TO CHECK BLOOD GLUCOSE FOUR TIMES EACH DAY. 200 strip 5    bumetanide (BUMEX) 1 MG tablet TAKE 1 TABLET BY MOUTH TWICE A DAY  5    FLUoxetine (PROZAC) 20 MG capsule TAKE 1 CAPSULE BY MOUTH EVERY DAY IN THE MORNING  2    KLOR-CON M20 20 MEQ extended release tablet TAKE 1 TABLET BY MOUTH EVERY OTHER DAY  3    losartan (COZAAR) 100 MG tablet TAKE 1 TABLET BY MOUTH EVERY DAY 30 tablet 0    minoxidil (LONITEN) 10 MG tablet TAKE 1 TABLET BY MOUTH THREE TIMES A DAY 30 tablet 0    FREESTYLE LANCETS MISC 1 each by Does not apply route daily 400 each 3    Lancets MISC 1 each by Does not apply route 4 times daily 600 each 1    LATUDA 20 MG TABS tablet TAKE 1 TABLET BY MOUTH EVERY DAY EVERY AFTERNOON  2    traZODone (DESYREL) 50 MG tablet TAKE 1/2 TABLET BY MOUTH AT BEDTIME  2    ergocalciferol (DRISDOL) 92991 units capsule Take with meal 2x/week (Patient taking differently: once a week Take with meal 2x/week) 8 capsule 3    amphetamine-dextroamphetamine (ADDERALL) 5 MG tablet TAKE 1 TABLET BY MOUTH TWICE A DAY  0    sodium bicarbonate 650 MG tablet Take 1 tablet by mouth 3 times daily 90 tablet 1    glucose monitoring kit (FREESTYLE) monitoring kit Use once.  1 kit 0    Blood Pressure Monitoring (ADULT BLOOD PRESSURE CUFF LG) KIT 1 kit by Does not apply route daily 1 kit 0    glucagon 1 MG injection Infuse 1 mg intravenously once for 1 dose Administer 1 mg under skin if blood sugars go <70 with in ability to take glucose tablets 1 kit 3       ROS -  Pulm - no cough  Gen - no fever    PE -  Gen : BP (!) 142/86   Pulse 90   Temp 97.8 °F (36.6 °C)   Wt 213 lb (96.6 kg)   SpO2 98%   BMI 30.56 kg/m²     Repeat /114   Did not take any of his BP medications today   WN, WD, NAD  Lung: Nml resp effort   Psych: Normal mood   Full affect  Neuro:  Moves all ext well  ______________________      HPI & A/P -   Problem 1  - Diabetes (type uncertain, + antibodies)  HPI   - See Background above for summary of care      Preventive and Surveillance care: Needs annual eye exam; UACR      Fingerstick BG in clinic 289 mg/dl      No BG data      Has been using  insulin for past 5 months  Assessment  - Uncontrolled per a1c; needs new supply of insulin and needs to check his BG  Plan   -       Check the BG before each meal and at bedtime      Take Lantus 30 units daily      Take 5 units of Humalog anytime the BG reading is >300 mg/dl (but no more freq than every 4 hours)      Cont glimepiride 4 mg dailiy      Have an annual eye exam performed      Have a urine test for protein    Follow up  Return to see me in one month      Trino Lopes MD   [facility] Endocrin[facility] & Obesity  21

## 2021-02-17 PROBLEM — E11.10 DIABETIC ACIDOSIS WITHOUT COMA (HCC): Status: RESOLVED | Noted: 2018-09-19 | Resolved: 2021-02-17

## 2021-03-01 ENCOUNTER — HOSPITAL ENCOUNTER (OUTPATIENT)
Age: 36
Discharge: HOME | End: 2021-03-01
Payer: COMMERCIAL

## 2021-03-01 LAB
ANION GAP SERPL CALCULATED.3IONS-SCNC: 9 MMOL/L (ref 7–16)
BASOPHILS ABSOLUTE: 0.05 E9/L (ref 0–0.2)
BASOPHILS RELATIVE PERCENT: 0.7 % (ref 0–2)
BUN BLDV-MCNC: 29 MG/DL (ref 6–20)
CALCIUM SERPL-MCNC: 8.9 MG/DL (ref 8.6–10.2)
CHLORIDE BLD-SCNC: 102 MMOL/L (ref 98–107)
CHOLESTEROL, TOTAL: 225 MG/DL (ref 0–199)
CO2: 24 MMOL/L (ref 22–29)
CREAT SERPL-MCNC: 2.2 MG/DL (ref 0.7–1.2)
EOSINOPHILS ABSOLUTE: 0.19 E9/L (ref 0.05–0.5)
EOSINOPHILS RELATIVE PERCENT: 2.8 % (ref 0–6)
GFR AFRICAN AMERICAN: 41
GFR NON-AFRICAN AMERICAN: 34 ML/MIN/1.73
GLUCOSE BLD-MCNC: 272 MG/DL (ref 74–99)
HBA1C MFR BLD: 10.9 % (ref 4–5.6)
HCT VFR BLD CALC: 44.7 % (ref 37–54)
HDLC SERPL-MCNC: 40 MG/DL
HEMOGLOBIN: 14.9 G/DL (ref 12.5–16.5)
IMMATURE GRANULOCYTES #: 0.02 E9/L
IMMATURE GRANULOCYTES %: 0.3 % (ref 0–5)
LDL CHOLESTEROL CALCULATED: 137 MG/DL (ref 0–99)
LYMPHOCYTES ABSOLUTE: 1.6 E9/L (ref 1.5–4)
LYMPHOCYTES RELATIVE PERCENT: 23.5 % (ref 20–42)
MCH RBC QN AUTO: 31 PG (ref 26–35)
MCHC RBC AUTO-ENTMCNC: 33.3 % (ref 32–34.5)
MCV RBC AUTO: 93.1 FL (ref 80–99.9)
MONOCYTES ABSOLUTE: 0.42 E9/L (ref 0.1–0.95)
MONOCYTES RELATIVE PERCENT: 6.2 % (ref 2–12)
NEUTROPHILS ABSOLUTE: 4.53 E9/L (ref 1.8–7.3)
NEUTROPHILS RELATIVE PERCENT: 66.5 % (ref 43–80)
PDW BLD-RTO: 13.4 FL (ref 11.5–15)
PHOSPHORUS: 2.5 MG/DL (ref 2.5–4.5)
PLATELET # BLD: 241 E9/L (ref 130–450)
PMV BLD AUTO: 10.7 FL (ref 7–12)
POTASSIUM SERPL-SCNC: 4.6 MMOL/L (ref 3.5–5)
RBC # BLD: 4.8 E12/L (ref 3.8–5.8)
SODIUM BLD-SCNC: 135 MMOL/L (ref 132–146)
TRIGL SERPL-MCNC: 242 MG/DL (ref 0–149)
VLDLC SERPL CALC-MCNC: 48 MG/DL
WBC # BLD: 6.8 E9/L (ref 4.5–11.5)

## 2021-03-01 PROCEDURE — 80048 BASIC METABOLIC PNL TOTAL CA: CPT

## 2021-03-01 PROCEDURE — 80061 LIPID PANEL: CPT

## 2021-03-01 PROCEDURE — 36415 COLL VENOUS BLD VENIPUNCTURE: CPT

## 2021-03-01 PROCEDURE — 85025 COMPLETE CBC W/AUTO DIFF WBC: CPT

## 2021-03-01 PROCEDURE — 84100 ASSAY OF PHOSPHORUS: CPT

## 2021-03-01 PROCEDURE — 83036 HEMOGLOBIN GLYCOSYLATED A1C: CPT

## 2021-03-04 LAB
AVERAGE GLUCOSE: 272
HBA1C MFR BLD: 10.9 %

## 2021-03-11 ENCOUNTER — VIRTUAL VISIT (OUTPATIENT)
Dept: ENDOCRINOLOGY | Age: 36
End: 2021-03-11
Payer: COMMERCIAL

## 2021-03-11 DIAGNOSIS — E10.11 TYPE 1 DIABETES MELLITUS WITH KETOACIDOTIC COMA (HCC): Primary | ICD-10-CM

## 2021-03-11 PROCEDURE — G8427 DOCREV CUR MEDS BY ELIG CLIN: HCPCS | Performed by: INTERNAL MEDICINE

## 2021-03-11 PROCEDURE — 3046F HEMOGLOBIN A1C LEVEL >9.0%: CPT | Performed by: INTERNAL MEDICINE

## 2021-03-11 PROCEDURE — 2022F DILAT RTA XM EVC RTNOPTHY: CPT | Performed by: INTERNAL MEDICINE

## 2021-03-11 PROCEDURE — 99213 OFFICE O/P EST LOW 20 MIN: CPT | Performed by: INTERNAL MEDICINE

## 2021-03-11 RX ORDER — BLOOD-GLUCOSE METER
KIT MISCELLANEOUS
Qty: 1 KIT | Refills: 0 | Status: SHIPPED | OUTPATIENT
Start: 2021-03-11

## 2021-03-11 NOTE — PROGRESS NOTES
CC -   Diabetes (type uncertain), Hyperglycemia    Background -   Last visit: 21    A1c  10.9% 3/1/21, 11.4% 21, too high for machine to read 20, 7.1% 3/29/19  Bx/d, fasting mid-100's, late evening low 200's  Hypoglycemia: 1x/month  Regimen: Lantus 30 units daily, Cont glimepiride 4 mg daily, also takes 5 units of Humalog anytime the BG reading is >300 mg/dl (but no more freq than every 4 hours)      Cont glimepiride 4 mg dailiy  Eyes:       2018, no DR (did not yet have his eye exam performed)  Kidneys:   3/01/21 BUN/Cr 29/2.2, GFR >41 (was 49)  UACR:      20 175.6  Lipids:       3/01/21 /40/137/242 TG  BP:           +HTN, Typically controlled with Norvasc, Toprol, Minoxidil, Bumex, Losartan  Statin: Atorvastatin 20mg  Ace-I: Losartan 100mg  Feet:  20  Good monofilament sensation b/l, no calluses, ulcers or red spots b/l  ______________________    [facility]  Medical problems: IDDM (type uncertain, 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), HTN, ADHD, Bipolar  Current Outpatient Medications   Medication Sig Dispense Refill    glimepiride (AMARYL) 4 MG tablet TAKE 1 TABLET BY MOUTH EVERY DAY BEFORE BREAKFAST 30 tablet 5    glucagon 1 MG injection  to inject 1 mg into the muscle or under the skin if you become unconscious for any reason other than suspected high BG 1 kit 3    ASPIRIN LOW DOSE 81 MG EC tablet TAKE 1 TABLET BY MOUTH EVERY DAY 30 tablet 3    metoprolol succinate (TOPROL XL) 200 MG extended release tablet TAKE 1 TABLET BY MOUTH EVERY DAY 30 tablet 3    insulin glargine (LANTUS SOLOSTAR) 100 UNIT/ML injection pen Inject 30 Units into the skin nightly (Patient taking differently: Inject 30 Units into the skin nightly Ran out last summer) 5 pen 2    atorvastatin (LIPITOR) 20 MG tablet Take 1 tablet by mouth daily 90 tablet 3    Insulin Syringe-Needle U-100 30G X 1/2\" 1 ML MISC 1 each by Does not apply route daily Use as directed 100 each 11    Insulin Pen Needle 32G X 4 MM MISC Use to take long acting insulin once each day 100 each 5    glucose ([place] GLUCOSE) 4 g chewable tablet Take 4 tablets by mouth as needed for Low blood sugar (<70 mg/dl) every 15 minutes until BG rises to >70mg/dl 60 tablet 3    amLODIPine (NORVASC) 10 MG tablet TAKE 1 TABLET BY MOUTH EVERY DAY 30 tablet 0    blood glucose test strips (FREESTYLE LITE) strip USE TO CHECK BLOOD GLUCOSE FOUR TIMES EACH DAY. 200 strip 5    bumetanide (BUMEX) 1 MG tablet TAKE 1 TABLET BY MOUTH TWICE A DAY  5    FLUoxetine (PROZAC) 20 MG capsule TAKE 1 CAPSULE BY MOUTH EVERY DAY IN THE MORNING  2    KLOR-CON M20 20 MEQ extended release tablet TAKE 1 TABLET BY MOUTH EVERY OTHER DAY  3    losartan (COZAAR) 100 MG tablet TAKE 1 TABLET BY MOUTH EVERY DAY 30 tablet 0    minoxidil (LONITEN) 10 MG tablet TAKE 1 TABLET BY MOUTH THREE TIMES A DAY 30 tablet 0    FREESTYLE LANCETS MISC 1 each by Does not apply route daily 400 each 3    Lancets MISC 1 each by Does not apply route 4 times daily 600 each 1    LATUDA 20 MG TABS tablet TAKE 1 TABLET BY MOUTH EVERY DAY EVERY AFTERNOON  2    traZODone (DESYREL) 50 MG tablet TAKE 1/2 TABLET BY MOUTH AT BEDTIME  2    ergocalciferol (DRISDOL) 76879 units capsule Take with meal 2x/week (Patient taking differently: once a week Take with meal 2x/week) 8 capsule 3    amphetamine-dextroamphetamine (ADDERALL) 5 MG tablet TAKE 1 TABLET BY MOUTH TWICE A DAY  0    sodium bicarbonate 650 MG tablet Take 1 tablet by mouth 3 times daily 90 tablet 1    glucose monitoring kit (FREESTYLE) monitoring kit Use once. 1 kit 0    Blood Pressure Monitoring (ADULT BLOOD PRESSURE CUFF LG) KIT 1 kit by Does not apply route daily 1 kit 0    glucagon 1 MG injection Infuse 1 mg intravenously once for 1 dose Administer 1 mg under skin if blood sugars go <70 with in ability to take glucose tablets 1 kit 3       ROS -  Pulm - no cough  Gen - no fever    PE -  Gen : There were no vitals taken for this visit.     WN, WD, NAD Lung: Nml resp effort   Psych: Normal mood   Full affect  ______________________      HPI & A/P -   Problem 1  - Diabetes (type uncertain, + antibodies)  HPI   - See Background above for summary of care      Preventive and Surveillance care: Needs annual eye exam; UACR      Per pt report, BG readings are much improved. States he has made his diet more healthy      I suggested increasing the lantus to lower the fasting readings below 130 mg/dl. However, he prefers to hold on this for now       His glucometer will not store the BG readings  Assessment  - Uncontrolled per a1c and fingersticks; however, it is improving  Plan   -       Check the BG before each meal and at bedtime      Take Lantus 30 units daily.  Increase the dose by two units if the fasting readings remain above 130 mg/dl      Take 5 units of Humalog anytime the BG reading is >300 mg/dl (but no more freq than every 4 hours)      Cont glimepiride 4 mg dailiy      Have an annual eye exam performed      Have a urine test for protein    Follow up  Return to see me in one month      Patience Ferguson MD   [facility] Endocrinology & Obesity  3/11/21

## 2021-03-11 NOTE — PROGRESS NOTES
Lin Velarde was read the following message We want to confirm that, for purposes of billing, this is a virtual visit with your provider for which we will submit a claim for reimbursement with your insurance company. You will be responsible for any copays, coinsurance amounts or other amounts not covered by your insurance company. If you do not accept this, unfortunately we will not be able to schedule or proceed with a virtual visit with the provider. Do you accept? Ursula Scherer responded Yes .

## 2021-03-11 NOTE — PATIENT INSTRUCTIONS
Check the BG before each meal and at bedtime  Take Lantus 30 units daily.  Increase the dose by two units if the fasting readings remain above 130 mg/dl  Take 5 units of Humalog anytime the BG reading is >300 mg/dl (but no more freq than every 4 hours)  Cont glimepiride 4 mg dailiy  Have an annual eye exam performed  Have a urine test for protein     Follow up  Return to see me in one month

## 2021-05-18 DIAGNOSIS — E11.9 TYPE 2 DIABETES MELLITUS WITHOUT COMPLICATIONS (HCC): ICD-10-CM

## 2021-05-20 RX ORDER — GLIMEPIRIDE 4 MG/1
TABLET ORAL
Qty: 30 TABLET | Refills: 5 | Status: SHIPPED
Start: 2021-05-20 | End: 2022-09-15

## 2021-05-24 ENCOUNTER — TELEPHONE (OUTPATIENT)
Dept: ENDOCRINOLOGY | Age: 36
End: 2021-05-24

## 2021-05-24 DIAGNOSIS — E11.9 TYPE 2 DIABETES MELLITUS WITHOUT COMPLICATION, WITH LONG-TERM CURRENT USE OF INSULIN (HCC): Primary | ICD-10-CM

## 2021-05-24 DIAGNOSIS — Z79.4 TYPE 2 DIABETES MELLITUS WITHOUT COMPLICATION, WITH LONG-TERM CURRENT USE OF INSULIN (HCC): Primary | ICD-10-CM

## 2021-05-24 RX ORDER — BLOOD SUGAR DIAGNOSTIC
1 STRIP MISCELLANEOUS DAILY
Qty: 400 EACH | Refills: 3 | Status: SHIPPED
Start: 2021-05-24 | End: 2021-05-28 | Stop reason: SDUPTHER

## 2021-05-24 RX ORDER — BLOOD-GLUCOSE METER
EACH MISCELLANEOUS
Qty: 1 KIT | Refills: 0 | Status: SHIPPED | OUTPATIENT
Start: 2021-05-24

## 2021-05-25 NOTE — TELEPHONE ENCOUNTER
Freestyle Letty not covered. One Touch is.   Rx sent  [facility]  05/24/21

## 2021-05-28 DIAGNOSIS — Z79.4 TYPE 2 DIABETES MELLITUS WITHOUT COMPLICATION, WITH LONG-TERM CURRENT USE OF INSULIN (HCC): ICD-10-CM

## 2021-05-28 DIAGNOSIS — E11.9 TYPE 2 DIABETES MELLITUS WITHOUT COMPLICATION, WITH LONG-TERM CURRENT USE OF INSULIN (HCC): ICD-10-CM

## 2021-05-28 RX ORDER — LANCETS 33 GAUGE
EACH MISCELLANEOUS
Qty: 100 EACH | Refills: 3 | Status: SHIPPED
Start: 2021-05-28 | End: 2022-08-09 | Stop reason: SDUPTHER

## 2021-05-28 RX ORDER — BLOOD SUGAR DIAGNOSTIC
1 STRIP MISCELLANEOUS DAILY
Qty: 50 EACH | Refills: 11 | Status: ON HOLD
Start: 2021-05-28 | End: 2022-08-02 | Stop reason: SDUPTHER

## 2021-06-15 ENCOUNTER — HOSPITAL ENCOUNTER (OUTPATIENT)
Age: 36
Discharge: HOME | End: 2021-06-15
Payer: COMMERCIAL

## 2021-06-15 LAB
ANION GAP SERPL CALCULATED.3IONS-SCNC: 16 MMOL/L (ref 7–16)
BASOPHILS ABSOLUTE: 0.03 E9/L (ref 0–0.2)
BASOPHILS RELATIVE PERCENT: 0.5 % (ref 0–2)
BUN BLDV-MCNC: 38 MG/DL (ref 6–20)
CALCIUM SERPL-MCNC: 9 MG/DL (ref 8.6–10.2)
CHLORIDE BLD-SCNC: 108 MMOL/L (ref 98–107)
CO2: 16 MMOL/L (ref 22–29)
CREAT SERPL-MCNC: 2.7 MG/DL (ref 0.7–1.2)
EOSINOPHILS ABSOLUTE: 0.16 E9/L (ref 0.05–0.5)
EOSINOPHILS RELATIVE PERCENT: 2.6 % (ref 0–6)
GFR AFRICAN AMERICAN: 32
GFR NON-AFRICAN AMERICAN: 27 ML/MIN/1.73
GLUCOSE BLD-MCNC: 118 MG/DL (ref 74–99)
HCT VFR BLD CALC: 42.3 % (ref 37–54)
HEMOGLOBIN: 14 G/DL (ref 12.5–16.5)
IMMATURE GRANULOCYTES #: 0.01 E9/L
IMMATURE GRANULOCYTES %: 0.2 % (ref 0–5)
LYMPHOCYTES ABSOLUTE: 1.85 E9/L (ref 1.5–4)
LYMPHOCYTES RELATIVE PERCENT: 29.7 % (ref 20–42)
MCH RBC QN AUTO: 31.5 PG (ref 26–35)
MCHC RBC AUTO-ENTMCNC: 33.1 % (ref 32–34.5)
MCV RBC AUTO: 95.1 FL (ref 80–99.9)
MONOCYTES ABSOLUTE: 0.42 E9/L (ref 0.1–0.95)
MONOCYTES RELATIVE PERCENT: 6.7 % (ref 2–12)
NEUTROPHILS ABSOLUTE: 3.76 E9/L (ref 1.8–7.3)
NEUTROPHILS RELATIVE PERCENT: 60.3 % (ref 43–80)
PDW BLD-RTO: 13.6 FL (ref 11.5–15)
PHOSPHORUS: 3.1 MG/DL (ref 2.5–4.5)
PLATELET # BLD: 166 E9/L (ref 130–450)
PMV BLD AUTO: 11.4 FL (ref 7–12)
POTASSIUM SERPL-SCNC: 4.5 MMOL/L (ref 3.5–5)
RBC # BLD: 4.45 E12/L (ref 3.8–5.8)
SODIUM BLD-SCNC: 140 MMOL/L (ref 132–146)
WBC # BLD: 6.2 E9/L (ref 4.5–11.5)

## 2021-06-15 PROCEDURE — 36415 COLL VENOUS BLD VENIPUNCTURE: CPT

## 2021-06-15 PROCEDURE — 80048 BASIC METABOLIC PNL TOTAL CA: CPT

## 2021-06-15 PROCEDURE — 84100 ASSAY OF PHOSPHORUS: CPT

## 2021-06-15 PROCEDURE — 85025 COMPLETE CBC W/AUTO DIFF WBC: CPT

## 2021-11-04 ENCOUNTER — HOSPITAL ENCOUNTER (OUTPATIENT)
Age: 36
Discharge: HOME | End: 2021-11-04
Payer: COMMERCIAL

## 2021-11-04 LAB
ANION GAP SERPL CALCULATED.3IONS-SCNC: 12 MMOL/L (ref 7–16)
BASOPHILS ABSOLUTE: 0.05 E9/L (ref 0–0.2)
BASOPHILS RELATIVE PERCENT: 0.8 % (ref 0–2)
BUN BLDV-MCNC: 28 MG/DL (ref 6–20)
CALCIUM SERPL-MCNC: 8.9 MG/DL (ref 8.6–10.2)
CHLORIDE BLD-SCNC: 106 MMOL/L (ref 98–107)
CO2: 20 MMOL/L (ref 22–29)
CREAT SERPL-MCNC: 2.6 MG/DL (ref 0.7–1.2)
EOSINOPHILS ABSOLUTE: 0.29 E9/L (ref 0.05–0.5)
EOSINOPHILS RELATIVE PERCENT: 4.9 % (ref 0–6)
GFR AFRICAN AMERICAN: 34
GFR NON-AFRICAN AMERICAN: 28 ML/MIN/1.73
GLUCOSE BLD-MCNC: 142 MG/DL (ref 74–99)
HCT VFR BLD CALC: 37.9 % (ref 37–54)
HEMOGLOBIN: 12.6 G/DL (ref 12.5–16.5)
IMMATURE GRANULOCYTES #: 0.01 E9/L
IMMATURE GRANULOCYTES %: 0.2 % (ref 0–5)
LYMPHOCYTES ABSOLUTE: 1.29 E9/L (ref 1.5–4)
LYMPHOCYTES RELATIVE PERCENT: 21.8 % (ref 20–42)
MCH RBC QN AUTO: 31.2 PG (ref 26–35)
MCHC RBC AUTO-ENTMCNC: 33.2 % (ref 32–34.5)
MCV RBC AUTO: 93.8 FL (ref 80–99.9)
MONOCYTES ABSOLUTE: 0.52 E9/L (ref 0.1–0.95)
MONOCYTES RELATIVE PERCENT: 8.8 % (ref 2–12)
NEUTROPHILS ABSOLUTE: 3.75 E9/L (ref 1.8–7.3)
NEUTROPHILS RELATIVE PERCENT: 63.5 % (ref 43–80)
PDW BLD-RTO: 14.3 FL (ref 11.5–15)
PHOSPHORUS: 2.5 MG/DL (ref 2.5–4.5)
PLATELET # BLD: 231 E9/L (ref 130–450)
PMV BLD AUTO: 10.4 FL (ref 7–12)
POTASSIUM SERPL-SCNC: 4.1 MMOL/L (ref 3.5–5)
RBC # BLD: 4.04 E12/L (ref 3.8–5.8)
SODIUM BLD-SCNC: 138 MMOL/L (ref 132–146)
WBC # BLD: 5.9 E9/L (ref 4.5–11.5)

## 2021-11-04 PROCEDURE — 84100 ASSAY OF PHOSPHORUS: CPT

## 2021-11-04 PROCEDURE — 80048 BASIC METABOLIC PNL TOTAL CA: CPT

## 2021-11-04 PROCEDURE — 85025 COMPLETE CBC W/AUTO DIFF WBC: CPT

## 2021-11-04 PROCEDURE — 36415 COLL VENOUS BLD VENIPUNCTURE: CPT

## 2021-11-09 ENCOUNTER — TELEPHONE (OUTPATIENT)
Dept: INTERNAL MEDICINE | Age: 36
End: 2021-11-09

## 2021-11-09 NOTE — TELEPHONE ENCOUNTER
Patient has not been seen in our office since 2019. Received follow-up note from Nephrology. Please advise as patient does not have follow-up appt scheduled but listed with Dr. Neris Baird. Needs follow-up.

## 2021-11-11 ENCOUNTER — TELEPHONE (OUTPATIENT)
Dept: INTERNAL MEDICINE | Age: 36
End: 2021-11-11

## 2021-11-11 NOTE — TELEPHONE ENCOUNTER
VM message left for patient to notify of office note received from Dr. Shonna Capellan. Requested patient call [phone] and follow the prompts for scheduling. Also requested pt to call and leave message for office if he now has a new PCP. Message was also left per other staff member on 11/9/21 for patient to call office and schedule appt.

## 2022-05-17 ENCOUNTER — HOSPITAL ENCOUNTER (OUTPATIENT)
Age: 37
Discharge: HOME | End: 2022-05-17
Payer: COMMERCIAL

## 2022-05-17 LAB
ANION GAP SERPL CALCULATED.3IONS-SCNC: 11 MMOL/L (ref 7–16)
BASOPHILS ABSOLUTE: 0.04 E9/L (ref 0–0.2)
BASOPHILS RELATIVE PERCENT: 0.6 % (ref 0–2)
BUN BLDV-MCNC: 43 MG/DL (ref 6–20)
CALCIUM SERPL-MCNC: 9 MG/DL (ref 8.6–10.2)
CHLORIDE BLD-SCNC: 104 MMOL/L (ref 98–107)
CHOLESTEROL, TOTAL: 157 MG/DL (ref 0–199)
CO2: 24 MMOL/L (ref 22–29)
CREAT SERPL-MCNC: 2.9 MG/DL (ref 0.7–1.2)
CREATININE URINE: 103 MG/DL (ref 40–278)
EOSINOPHILS ABSOLUTE: 0.28 E9/L (ref 0.05–0.5)
EOSINOPHILS RELATIVE PERCENT: 4.1 % (ref 0–6)
GFR AFRICAN AMERICAN: 30
GFR NON-AFRICAN AMERICAN: 25 ML/MIN/1.73
GLUCOSE BLD-MCNC: 124 MG/DL (ref 74–99)
HBA1C MFR BLD: 8.4 % (ref 4–5.6)
HCT VFR BLD CALC: 39.8 % (ref 37–54)
HDLC SERPL-MCNC: 30 MG/DL
HEMOGLOBIN: 13.3 G/DL (ref 12.5–16.5)
IMMATURE GRANULOCYTES #: 0.01 E9/L
IMMATURE GRANULOCYTES %: 0.1 % (ref 0–5)
LDL CHOLESTEROL CALCULATED: 101 MG/DL (ref 0–99)
LYMPHOCYTES ABSOLUTE: 2.37 E9/L (ref 1.5–4)
LYMPHOCYTES RELATIVE PERCENT: 34.3 % (ref 20–42)
MCH RBC QN AUTO: 31.5 PG (ref 26–35)
MCHC RBC AUTO-ENTMCNC: 33.4 % (ref 32–34.5)
MCV RBC AUTO: 94.3 FL (ref 80–99.9)
MICROALBUMIN UR-MCNC: 49.7 MG/L
MICROALBUMIN/CREAT UR-RTO: 48.3 (ref 0–30)
MONOCYTES ABSOLUTE: 0.52 E9/L (ref 0.1–0.95)
MONOCYTES RELATIVE PERCENT: 7.5 % (ref 2–12)
NEUTROPHILS ABSOLUTE: 3.69 E9/L (ref 1.8–7.3)
NEUTROPHILS RELATIVE PERCENT: 53.4 % (ref 43–80)
PARATHYROID HORMONE INTACT: 167 PG/ML (ref 15–65)
PDW BLD-RTO: 13.8 FL (ref 11.5–15)
PHOSPHORUS: 3.3 MG/DL (ref 2.5–4.5)
PLATELET # BLD: 183 E9/L (ref 130–450)
PMV BLD AUTO: 10.5 FL (ref 7–12)
POTASSIUM SERPL-SCNC: 3.9 MMOL/L (ref 3.5–5)
RBC # BLD: 4.22 E12/L (ref 3.8–5.8)
SODIUM BLD-SCNC: 139 MMOL/L (ref 132–146)
TRIGL SERPL-MCNC: 129 MG/DL (ref 0–149)
VITAMIN D 25-HYDROXY: 36 NG/ML (ref 30–100)
VLDLC SERPL CALC-MCNC: 26 MG/DL
WBC # BLD: 6.9 E9/L (ref 4.5–11.5)

## 2022-05-17 PROCEDURE — 82570 ASSAY OF URINE CREATININE: CPT

## 2022-05-17 PROCEDURE — 85025 COMPLETE CBC W/AUTO DIFF WBC: CPT

## 2022-05-17 PROCEDURE — 82306 VITAMIN D 25 HYDROXY: CPT

## 2022-05-17 PROCEDURE — 82044 UR ALBUMIN SEMIQUANTITATIVE: CPT

## 2022-05-17 PROCEDURE — 80061 LIPID PANEL: CPT

## 2022-05-17 PROCEDURE — 83036 HEMOGLOBIN GLYCOSYLATED A1C: CPT

## 2022-05-17 PROCEDURE — 80048 BASIC METABOLIC PNL TOTAL CA: CPT

## 2022-05-17 PROCEDURE — 83970 ASSAY OF PARATHORMONE: CPT

## 2022-05-17 PROCEDURE — 36415 COLL VENOUS BLD VENIPUNCTURE: CPT

## 2022-05-17 PROCEDURE — 84100 ASSAY OF PHOSPHORUS: CPT

## 2022-06-21 ENCOUNTER — HOSPITAL ENCOUNTER (OUTPATIENT)
Age: 37
Discharge: HOME | End: 2022-06-21
Payer: COMMERCIAL

## 2022-06-21 LAB
ANION GAP SERPL CALCULATED.3IONS-SCNC: 12 MMOL/L (ref 7–16)
BASOPHILS ABSOLUTE: 0.04 E9/L (ref 0–0.2)
BASOPHILS RELATIVE PERCENT: 0.6 % (ref 0–2)
BUN BLDV-MCNC: 40 MG/DL (ref 6–20)
CALCIUM SERPL-MCNC: 9.1 MG/DL (ref 8.6–10.2)
CHLORIDE BLD-SCNC: 103 MMOL/L (ref 98–107)
CO2: 22 MMOL/L (ref 22–29)
CREAT SERPL-MCNC: 3 MG/DL (ref 0.7–1.2)
EOSINOPHILS ABSOLUTE: 0.22 E9/L (ref 0.05–0.5)
EOSINOPHILS RELATIVE PERCENT: 3.3 % (ref 0–6)
GFR AFRICAN AMERICAN: 29
GFR NON-AFRICAN AMERICAN: 24 ML/MIN/1.73
GLUCOSE BLD-MCNC: 289 MG/DL (ref 74–99)
HCT VFR BLD CALC: 39.4 % (ref 37–54)
HEMOGLOBIN: 13.3 G/DL (ref 12.5–16.5)
IMMATURE GRANULOCYTES #: 0.01 E9/L
IMMATURE GRANULOCYTES %: 0.2 % (ref 0–5)
LYMPHOCYTES ABSOLUTE: 1.96 E9/L (ref 1.5–4)
LYMPHOCYTES RELATIVE PERCENT: 29.5 % (ref 20–42)
MCH RBC QN AUTO: 31.1 PG (ref 26–35)
MCHC RBC AUTO-ENTMCNC: 33.8 % (ref 32–34.5)
MCV RBC AUTO: 92.3 FL (ref 80–99.9)
MONOCYTES ABSOLUTE: 0.64 E9/L (ref 0.1–0.95)
MONOCYTES RELATIVE PERCENT: 9.6 % (ref 2–12)
NEUTROPHILS ABSOLUTE: 3.77 E9/L (ref 1.8–7.3)
NEUTROPHILS RELATIVE PERCENT: 56.8 % (ref 43–80)
PDW BLD-RTO: 13.1 FL (ref 11.5–15)
PHOSPHORUS: 3.4 MG/DL (ref 2.5–4.5)
PLATELET # BLD: 197 E9/L (ref 130–450)
PMV BLD AUTO: 11 FL (ref 7–12)
POTASSIUM SERPL-SCNC: 4.1 MMOL/L (ref 3.5–5)
RBC # BLD: 4.27 E12/L (ref 3.8–5.8)
SODIUM BLD-SCNC: 137 MMOL/L (ref 132–146)
WBC # BLD: 6.6 E9/L (ref 4.5–11.5)

## 2022-06-21 PROCEDURE — 84100 ASSAY OF PHOSPHORUS: CPT

## 2022-06-21 PROCEDURE — 80048 BASIC METABOLIC PNL TOTAL CA: CPT

## 2022-06-21 PROCEDURE — 36415 COLL VENOUS BLD VENIPUNCTURE: CPT

## 2022-06-21 PROCEDURE — 85025 COMPLETE CBC W/AUTO DIFF WBC: CPT

## 2022-07-31 ENCOUNTER — APPOINTMENT (OUTPATIENT)
Dept: GENERAL RADIOLOGY | Age: 37
DRG: 420 | End: 2022-07-31
Payer: COMMERCIAL

## 2022-07-31 ENCOUNTER — HOSPITAL ENCOUNTER (INPATIENT)
Age: 37
LOS: 2 days | Discharge: HOME | DRG: 420 | End: 2022-08-02
Attending: STUDENT IN AN ORGANIZED HEALTH CARE EDUCATION/TRAINING PROGRAM | Admitting: INTERNAL MEDICINE
Payer: COMMERCIAL

## 2022-07-31 ENCOUNTER — APPOINTMENT (OUTPATIENT)
Dept: NUCLEAR MEDICINE | Age: 37
DRG: 420 | End: 2022-07-31
Payer: COMMERCIAL

## 2022-07-31 DIAGNOSIS — E10.40 TYPE 1 DIABETES MELLITUS WITH DIABETIC NEUROPATHY (HCC): ICD-10-CM

## 2022-07-31 DIAGNOSIS — I10 ESSENTIAL HYPERTENSION: ICD-10-CM

## 2022-07-31 DIAGNOSIS — E11.9 TYPE 2 DIABETES MELLITUS WITHOUT COMPLICATION, WITH LONG-TERM CURRENT USE OF INSULIN (HCC): ICD-10-CM

## 2022-07-31 DIAGNOSIS — Z79.4 TYPE 2 DIABETES MELLITUS WITHOUT COMPLICATION, WITH LONG-TERM CURRENT USE OF INSULIN (HCC): ICD-10-CM

## 2022-07-31 DIAGNOSIS — E10.11 TYPE 1 DIABETES MELLITUS WITH KETOACIDOTIC COMA (HCC): ICD-10-CM

## 2022-07-31 DIAGNOSIS — E78.5 HYPERLIPIDEMIA, UNSPECIFIED HYPERLIPIDEMIA TYPE: ICD-10-CM

## 2022-07-31 DIAGNOSIS — E10.10 TYPE 1 DIABETES MELLITUS WITH KETOACIDOSIS WITHOUT COMA (HCC): Primary | ICD-10-CM

## 2022-07-31 LAB
ALBUMIN SERPL-MCNC: 4.2 G/DL (ref 3.5–5.2)
ALP BLD-CCNC: 85 U/L (ref 40–129)
ALT SERPL-CCNC: 14 U/L (ref 0–40)
ANION GAP SERPL CALCULATED.3IONS-SCNC: 19 MMOL/L (ref 7–16)
ANION GAP SERPL CALCULATED.3IONS-SCNC: 19 MMOL/L (ref 7–16)
AST SERPL-CCNC: 9 U/L (ref 0–39)
BACTERIA: ABNORMAL /HPF
BASOPHILS ABSOLUTE: 0.02 E9/L (ref 0–0.2)
BASOPHILS RELATIVE PERCENT: 0.4 % (ref 0–2)
BETA-HYDROXYBUTYRATE: 1.78 MMOL/L (ref 0.02–0.27)
BILIRUB SERPL-MCNC: 0.8 MG/DL (ref 0–1.2)
BILIRUBIN URINE: NEGATIVE
BLOOD, URINE: ABNORMAL
BUN BLDV-MCNC: 69 MG/DL (ref 6–20)
BUN BLDV-MCNC: 74 MG/DL (ref 6–20)
CALCIUM SERPL-MCNC: 8.8 MG/DL (ref 8.6–10.2)
CALCIUM SERPL-MCNC: 9.5 MG/DL (ref 8.6–10.2)
CHLORIDE BLD-SCNC: 87 MMOL/L (ref 98–107)
CHLORIDE BLD-SCNC: 92 MMOL/L (ref 98–107)
CLARITY: CLEAR
CO2: 19 MMOL/L (ref 22–29)
CO2: 22 MMOL/L (ref 22–29)
COLOR: YELLOW
CREAT SERPL-MCNC: 3.1 MG/DL (ref 0.7–1.2)
CREAT SERPL-MCNC: 3.7 MG/DL (ref 0.7–1.2)
D DIMER: 954 NG/ML DDU
EOSINOPHILS ABSOLUTE: 0.05 E9/L (ref 0.05–0.5)
EOSINOPHILS RELATIVE PERCENT: 1 % (ref 0–6)
EPITHELIAL CELLS, UA: ABNORMAL /HPF
GFR AFRICAN AMERICAN: 22
GFR AFRICAN AMERICAN: 27
GFR NON-AFRICAN AMERICAN: 18 ML/MIN/1.73
GFR NON-AFRICAN AMERICAN: 23 ML/MIN/1.73
GLUCOSE BLD-MCNC: 541 MG/DL (ref 74–99)
GLUCOSE BLD-MCNC: 552 MG/DL (ref 74–99)
GLUCOSE URINE: >=1000 MG/DL
HCT VFR BLD CALC: 41.1 % (ref 37–54)
HEMOGLOBIN: 14.5 G/DL (ref 12.5–16.5)
IMMATURE GRANULOCYTES #: 0.01 E9/L
IMMATURE GRANULOCYTES %: 0.2 % (ref 0–5)
KETONES, URINE: ABNORMAL MG/DL
LACTIC ACID: 1.3 MMOL/L (ref 0.5–2.2)
LEUKOCYTE ESTERASE, URINE: NEGATIVE
LIPASE: 105 U/L (ref 13–60)
LYMPHOCYTES ABSOLUTE: 1.35 E9/L (ref 1.5–4)
LYMPHOCYTES RELATIVE PERCENT: 26.2 % (ref 20–42)
MCH RBC QN AUTO: 31 PG (ref 26–35)
MCHC RBC AUTO-ENTMCNC: 35.3 % (ref 32–34.5)
MCV RBC AUTO: 88 FL (ref 80–99.9)
METER GLUCOSE: >500 MG/DL (ref 74–99)
METER GLUCOSE: >500 MG/DL (ref 74–99)
MONOCYTES ABSOLUTE: 0.44 E9/L (ref 0.1–0.95)
MONOCYTES RELATIVE PERCENT: 8.5 % (ref 2–12)
NEUTROPHILS ABSOLUTE: 3.28 E9/L (ref 1.8–7.3)
NEUTROPHILS RELATIVE PERCENT: 63.7 % (ref 43–80)
NITRITE, URINE: NEGATIVE
PDW BLD-RTO: 11.9 FL (ref 11.5–15)
PH UA: 5.5 (ref 5–9)
PH VENOUS: 7.3 (ref 7.35–7.45)
PLATELET # BLD: 114 E9/L (ref 130–450)
PMV BLD AUTO: 12.9 FL (ref 7–12)
POTASSIUM SERPL-SCNC: 4.5 MMOL/L (ref 3.5–5)
POTASSIUM SERPL-SCNC: 5.4 MMOL/L (ref 3.5–5)
PROTEIN UA: NEGATIVE MG/DL
RBC # BLD: 4.67 E12/L (ref 3.8–5.8)
RBC UA: ABNORMAL /HPF (ref 0–2)
SODIUM BLD-SCNC: 128 MMOL/L (ref 132–146)
SODIUM BLD-SCNC: 130 MMOL/L (ref 132–146)
SPECIFIC GRAVITY UA: 1.01 (ref 1–1.03)
TOTAL PROTEIN: 7.5 G/DL (ref 6.4–8.3)
TROPONIN, HIGH SENSITIVITY: 42 NG/L (ref 0–11)
TROPONIN, HIGH SENSITIVITY: 52 NG/L (ref 0–11)
UROBILINOGEN, URINE: 0.2 E.U./DL
WBC # BLD: 5.2 E9/L (ref 4.5–11.5)
WBC UA: ABNORMAL /HPF (ref 0–5)

## 2022-07-31 PROCEDURE — 2000000000 HC ICU R&B

## 2022-07-31 PROCEDURE — 36415 COLL VENOUS BLD VENIPUNCTURE: CPT

## 2022-07-31 PROCEDURE — 96361 HYDRATE IV INFUSION ADD-ON: CPT

## 2022-07-31 PROCEDURE — 82800 BLOOD PH: CPT

## 2022-07-31 PROCEDURE — 82010 KETONE BODYS QUAN: CPT

## 2022-07-31 PROCEDURE — 2580000003 HC RX 258: Performed by: STUDENT IN AN ORGANIZED HEALTH CARE EDUCATION/TRAINING PROGRAM

## 2022-07-31 PROCEDURE — 2580000003 HC RX 258: Performed by: PHYSICIAN ASSISTANT

## 2022-07-31 PROCEDURE — 78580 LUNG PERFUSION IMAGING: CPT | Performed by: RADIOLOGY

## 2022-07-31 PROCEDURE — 93005 ELECTROCARDIOGRAM TRACING: CPT | Performed by: PHYSICIAN ASSISTANT

## 2022-07-31 PROCEDURE — 82962 GLUCOSE BLOOD TEST: CPT

## 2022-07-31 PROCEDURE — 71046 X-RAY EXAM CHEST 2 VIEWS: CPT

## 2022-07-31 PROCEDURE — 85025 COMPLETE CBC W/AUTO DIFF WBC: CPT

## 2022-07-31 PROCEDURE — 85378 FIBRIN DEGRADE SEMIQUANT: CPT

## 2022-07-31 PROCEDURE — 96374 THER/PROPH/DIAG INJ IV PUSH: CPT

## 2022-07-31 PROCEDURE — 6370000000 HC RX 637 (ALT 250 FOR IP): Performed by: PHYSICIAN ASSISTANT

## 2022-07-31 PROCEDURE — 78580 LUNG PERFUSION IMAGING: CPT

## 2022-07-31 PROCEDURE — 83690 ASSAY OF LIPASE: CPT

## 2022-07-31 PROCEDURE — 81001 URINALYSIS AUTO W/SCOPE: CPT

## 2022-07-31 PROCEDURE — 80053 COMPREHEN METABOLIC PANEL: CPT

## 2022-07-31 PROCEDURE — 84484 ASSAY OF TROPONIN QUANT: CPT

## 2022-07-31 PROCEDURE — 6360000002 HC RX W HCPCS: Performed by: PHYSICIAN ASSISTANT

## 2022-07-31 PROCEDURE — 96375 TX/PRO/DX INJ NEW DRUG ADDON: CPT

## 2022-07-31 PROCEDURE — 99285 EMERGENCY DEPT VISIT HI MDM: CPT

## 2022-07-31 PROCEDURE — A9540 TC99M MAA: HCPCS | Performed by: RADIOLOGY

## 2022-07-31 PROCEDURE — 80048 BASIC METABOLIC PNL TOTAL CA: CPT

## 2022-07-31 PROCEDURE — 83605 ASSAY OF LACTIC ACID: CPT

## 2022-07-31 PROCEDURE — 3430000000 HC RX DIAGNOSTIC RADIOPHARMACEUTICAL: Performed by: RADIOLOGY

## 2022-07-31 RX ORDER — 0.9 % SODIUM CHLORIDE 0.9 %
1000 INTRAVENOUS SOLUTION INTRAVENOUS ONCE
Status: COMPLETED | OUTPATIENT
Start: 2022-07-31 | End: 2022-07-31

## 2022-07-31 RX ORDER — ONDANSETRON 2 MG/ML
4 INJECTION INTRAMUSCULAR; INTRAVENOUS ONCE
Status: COMPLETED | OUTPATIENT
Start: 2022-07-31 | End: 2022-07-31

## 2022-07-31 RX ORDER — 0.9 % SODIUM CHLORIDE 0.9 %
15 INTRAVENOUS SOLUTION INTRAVENOUS ONCE
Status: DISCONTINUED | OUTPATIENT
Start: 2022-07-31 | End: 2022-08-01

## 2022-07-31 RX ORDER — DEXTROSE AND SODIUM CHLORIDE 5; .45 G/100ML; G/100ML
INJECTION, SOLUTION INTRAVENOUS CONTINUOUS PRN
Status: DISCONTINUED | OUTPATIENT
Start: 2022-07-31 | End: 2022-08-01

## 2022-07-31 RX ORDER — SODIUM CHLORIDE 9 MG/ML
INJECTION, SOLUTION INTRAVENOUS CONTINUOUS
Status: DISCONTINUED | OUTPATIENT
Start: 2022-08-01 | End: 2022-08-01

## 2022-07-31 RX ORDER — POTASSIUM CHLORIDE 7.45 MG/ML
10 INJECTION INTRAVENOUS PRN
Status: DISCONTINUED | OUTPATIENT
Start: 2022-07-31 | End: 2022-08-01

## 2022-07-31 RX ORDER — 0.9 % SODIUM CHLORIDE 0.9 %
1000 INTRAVENOUS SOLUTION INTRAVENOUS ONCE
Status: COMPLETED | OUTPATIENT
Start: 2022-07-31 | End: 2022-08-01

## 2022-07-31 RX ORDER — MAGNESIUM SULFATE 1 G/100ML
1000 INJECTION INTRAVENOUS PRN
Status: DISCONTINUED | OUTPATIENT
Start: 2022-07-31 | End: 2022-08-01

## 2022-07-31 RX ADMIN — SODIUM CHLORIDE 1000 ML: 9 INJECTION, SOLUTION INTRAVENOUS at 20:01

## 2022-07-31 RX ADMIN — SODIUM CHLORIDE 1000 ML: 9 INJECTION, SOLUTION INTRAVENOUS at 23:26

## 2022-07-31 RX ADMIN — ONDANSETRON HYDROCHLORIDE 4 MG: 2 SOLUTION INTRAMUSCULAR; INTRAVENOUS at 19:59

## 2022-07-31 RX ADMIN — INSULIN HUMAN 10 UNITS: 100 INJECTION, SOLUTION PARENTERAL at 22:15

## 2022-07-31 RX ADMIN — Medication 6 MILLICURIE: at 22:34

## 2022-07-31 NOTE — ED NOTES
Department of Emergency Medicine  FIRST PROVIDER TRIAGE NOTE             Independent MLP           7/31/22  7:45 PM EDT    Date of Encounter: 7/31/22   MRN: [id number]      HPI: Sav Raymundo is a 39 y.o. male who presents to the ED for Fatigue (Pt states he cant keep any food or liquids down for multiple days. Pt says he tested positive for covid last Wednesday.), Shortness of Breath, and Dizziness (Pts wife states he sleeps constantly on and off throughout the day)     Patient is a 58-year-old that last week he was diagnosed with COVID. Patient states he just feels extremely dehydrated and rundown. Patient states his body still aches and is having abdominal pain and shortness of breath. Patient states is also feeling intermittent dizziness and sleeping all the time. They are concerned that he is dehydrated and due to his lack of appetite brought him in for further evaluation    ROS: Negative for cough, vomiting, diarrhea, urinary complaints, or rash. PE: Gen Appearance/Constitutional: alert  HEENT: NC/NT. PERRLA,  Airway patent. Neck: supple     Initial Plan of Care: All treatment areas with department are currently occupied. Plan to order/Initiate the following while awaiting opening in ED: labs, EKG, and imaging studies.   Initiate Treatment-Testing, Proceed toTreatment Area When Bed Available for ED Attending/MLP to Continue Care    Electronically signed by Suma Bates PA-C   DD: 7/31/22       Suma Bates PA-C  07/31/22 1946

## 2022-08-01 PROBLEM — E10.10 DKA, TYPE 1, NOT AT GOAL (HCC): Status: ACTIVE | Noted: 2022-08-01

## 2022-08-01 LAB
ACETAMINOPHEN LEVEL: <5 MCG/ML (ref 10–30)
ADENOVIRUS BY PCR: NOT DETECTED
ANION GAP SERPL CALCULATED.3IONS-SCNC: 10 MMOL/L (ref 7–16)
ANION GAP SERPL CALCULATED.3IONS-SCNC: 11 MMOL/L (ref 7–16)
ANION GAP SERPL CALCULATED.3IONS-SCNC: 11 MMOL/L (ref 7–16)
ANION GAP SERPL CALCULATED.3IONS-SCNC: 12 MMOL/L (ref 7–16)
BASOPHILS ABSOLUTE: 0.01 E9/L (ref 0–0.2)
BASOPHILS RELATIVE PERCENT: 0.2 % (ref 0–2)
BETA-HYDROXYBUTYRATE: 0.81 MMOL/L (ref 0.02–0.27)
BORDETELLA PARAPERTUSSIS BY PCR: NOT DETECTED
BORDETELLA PERTUSSIS BY PCR: NOT DETECTED
BUN BLDV-MCNC: 43 MG/DL (ref 6–20)
BUN BLDV-MCNC: 50 MG/DL (ref 6–20)
BUN BLDV-MCNC: 50 MG/DL (ref 6–20)
BUN BLDV-MCNC: 63 MG/DL (ref 6–20)
CALCIUM SERPL-MCNC: 7.5 MG/DL (ref 8.6–10.2)
CALCIUM SERPL-MCNC: 8.5 MG/DL (ref 8.6–10.2)
CALCIUM SERPL-MCNC: 8.6 MG/DL (ref 8.6–10.2)
CALCIUM SERPL-MCNC: 8.8 MG/DL (ref 8.6–10.2)
CHLAMYDOPHILIA PNEUMONIAE BY PCR: NOT DETECTED
CHLORIDE BLD-SCNC: 103 MMOL/L (ref 98–107)
CHLORIDE BLD-SCNC: 103 MMOL/L (ref 98–107)
CHLORIDE BLD-SCNC: 106 MMOL/L (ref 98–107)
CHLORIDE BLD-SCNC: 106 MMOL/L (ref 98–107)
CHOLESTEROL, TOTAL: 156 MG/DL (ref 0–199)
CHP ED QC CHECK: YES
CO2: 20 MMOL/L (ref 22–29)
CO2: 21 MMOL/L (ref 22–29)
CO2: 21 MMOL/L (ref 22–29)
CO2: 24 MMOL/L (ref 22–29)
CORONAVIRUS 229E BY PCR: NOT DETECTED
CORONAVIRUS HKU1 BY PCR: NOT DETECTED
CORONAVIRUS NL63 BY PCR: NOT DETECTED
CORONAVIRUS OC43 BY PCR: NOT DETECTED
CREAT SERPL-MCNC: 2.3 MG/DL (ref 0.7–1.2)
CREAT SERPL-MCNC: 2.4 MG/DL (ref 0.7–1.2)
CREAT SERPL-MCNC: 2.6 MG/DL (ref 0.7–1.2)
CREAT SERPL-MCNC: 2.9 MG/DL (ref 0.7–1.2)
EKG ATRIAL RATE: 66 BPM
EKG P AXIS: 55 DEGREES
EKG P-R INTERVAL: 178 MS
EKG Q-T INTERVAL: 428 MS
EKG QRS DURATION: 102 MS
EKG QTC CALCULATION (BAZETT): 448 MS
EKG R AXIS: 54 DEGREES
EKG T AXIS: 87 DEGREES
EKG VENTRICULAR RATE: 66 BPM
EOSINOPHILS ABSOLUTE: 0.08 E9/L (ref 0.05–0.5)
EOSINOPHILS RELATIVE PERCENT: 1.2 % (ref 0–6)
ETHANOL: <10 MG/DL (ref 0–0.08)
GFR AFRICAN AMERICAN: 30
GFR AFRICAN AMERICAN: 34
GFR AFRICAN AMERICAN: 37
GFR AFRICAN AMERICAN: 39
GFR NON-AFRICAN AMERICAN: 24 ML/MIN/1.73
GFR NON-AFRICAN AMERICAN: 28 ML/MIN/1.73
GFR NON-AFRICAN AMERICAN: 30 ML/MIN/1.73
GFR NON-AFRICAN AMERICAN: 32 ML/MIN/1.73
GLUCOSE BLD-MCNC: 140 MG/DL (ref 74–99)
GLUCOSE BLD-MCNC: 248 MG/DL (ref 74–99)
GLUCOSE BLD-MCNC: 297 MG/DL (ref 74–99)
GLUCOSE BLD-MCNC: 345 MG/DL (ref 74–99)
GLUCOSE BLD-MCNC: 359 MG/DL
GLUCOSE BLD-MCNC: 537 MG/DL (ref 74–99)
HBA1C MFR BLD: 14.8 % (ref 4–5.6)
HCT VFR BLD CALC: 35.3 % (ref 37–54)
HDLC SERPL-MCNC: 16 MG/DL
HEMOGLOBIN: 12.4 G/DL (ref 12.5–16.5)
HUMAN METAPNEUMOVIRUS BY PCR: NOT DETECTED
HUMAN RHINOVIRUS/ENTEROVIRUS BY PCR: NOT DETECTED
IMMATURE GRANULOCYTES #: 0.02 E9/L
IMMATURE GRANULOCYTES %: 0.3 % (ref 0–5)
INFLUENZA A BY PCR: NOT DETECTED
INFLUENZA B BY PCR: NOT DETECTED
LDL CHOLESTEROL CALCULATED: 63 MG/DL (ref 0–99)
LIPASE: 95 U/L (ref 13–60)
LYMPHOCYTES ABSOLUTE: 2.19 E9/L (ref 1.5–4)
LYMPHOCYTES RELATIVE PERCENT: 33.1 % (ref 20–42)
MAGNESIUM: 2.2 MG/DL (ref 1.6–2.6)
MAGNESIUM: 2.3 MG/DL (ref 1.6–2.6)
MAGNESIUM: 2.3 MG/DL (ref 1.6–2.6)
MAGNESIUM: 2.6 MG/DL (ref 1.6–2.6)
MCH RBC QN AUTO: 31.8 PG (ref 26–35)
MCHC RBC AUTO-ENTMCNC: 35.1 % (ref 32–34.5)
MCV RBC AUTO: 90.5 FL (ref 80–99.9)
METER GLUCOSE: 106 MG/DL (ref 74–99)
METER GLUCOSE: 114 MG/DL (ref 74–99)
METER GLUCOSE: 125 MG/DL (ref 74–99)
METER GLUCOSE: 141 MG/DL (ref 74–99)
METER GLUCOSE: 260 MG/DL (ref 74–99)
METER GLUCOSE: 262 MG/DL (ref 74–99)
METER GLUCOSE: 292 MG/DL (ref 74–99)
METER GLUCOSE: 314 MG/DL (ref 74–99)
METER GLUCOSE: 341 MG/DL (ref 74–99)
METER GLUCOSE: 359 MG/DL (ref 74–99)
METER GLUCOSE: 99 MG/DL (ref 74–99)
METER GLUCOSE: >500 MG/DL (ref 74–99)
MONOCYTES ABSOLUTE: 0.7 E9/L (ref 0.1–0.95)
MONOCYTES RELATIVE PERCENT: 10.6 % (ref 2–12)
MYCOPLASMA PNEUMONIAE BY PCR: NOT DETECTED
NEUTROPHILS ABSOLUTE: 3.61 E9/L (ref 1.8–7.3)
NEUTROPHILS RELATIVE PERCENT: 54.6 % (ref 43–80)
PARAINFLUENZA VIRUS 1 BY PCR: NOT DETECTED
PARAINFLUENZA VIRUS 2 BY PCR: NOT DETECTED
PARAINFLUENZA VIRUS 3 BY PCR: NOT DETECTED
PARAINFLUENZA VIRUS 4 BY PCR: NOT DETECTED
PDW BLD-RTO: 11.9 FL (ref 11.5–15)
PHOSPHORUS: 1.9 MG/DL (ref 2.5–4.5)
PHOSPHORUS: 2.4 MG/DL (ref 2.5–4.5)
PHOSPHORUS: 2.6 MG/DL (ref 2.5–4.5)
PHOSPHORUS: 2.9 MG/DL (ref 2.5–4.5)
PLATELET # BLD: 113 E9/L (ref 130–450)
PMV BLD AUTO: 12.3 FL (ref 7–12)
POTASSIUM SERPL-SCNC: 3.7 MMOL/L (ref 3.5–5)
POTASSIUM SERPL-SCNC: 3.9 MMOL/L (ref 3.5–5)
POTASSIUM SERPL-SCNC: 4.4 MMOL/L (ref 3.5–5)
POTASSIUM SERPL-SCNC: 4.5 MMOL/L (ref 3.5–5)
RBC # BLD: 3.9 E12/L (ref 3.8–5.8)
RESPIRATORY SYNCYTIAL VIRUS BY PCR: NOT DETECTED
SALICYLATE, SERUM: <0.3 MG/DL (ref 0–30)
SARS-COV-2, PCR: DETECTED
SODIUM BLD-SCNC: 136 MMOL/L (ref 132–146)
SODIUM BLD-SCNC: 136 MMOL/L (ref 132–146)
SODIUM BLD-SCNC: 138 MMOL/L (ref 132–146)
SODIUM BLD-SCNC: 138 MMOL/L (ref 132–146)
TRICYCLIC ANTIDEPRESSANTS SCREEN SERUM: NEGATIVE NG/ML
TRIGL SERPL-MCNC: 383 MG/DL (ref 0–149)
TROPONIN, HIGH SENSITIVITY: 38 NG/L (ref 0–11)
VLDLC SERPL CALC-MCNC: 77 MG/DL
WBC # BLD: 6.6 E9/L (ref 4.5–11.5)

## 2022-08-01 PROCEDURE — 6360000002 HC RX W HCPCS: Performed by: FAMILY MEDICINE

## 2022-08-01 PROCEDURE — 6370000000 HC RX 637 (ALT 250 FOR IP): Performed by: FAMILY MEDICINE

## 2022-08-01 PROCEDURE — 2580000003 HC RX 258

## 2022-08-01 PROCEDURE — 84484 ASSAY OF TROPONIN QUANT: CPT

## 2022-08-01 PROCEDURE — 83690 ASSAY OF LIPASE: CPT

## 2022-08-01 PROCEDURE — 80179 DRUG ASSAY SALICYLATE: CPT

## 2022-08-01 PROCEDURE — 82077 ASSAY SPEC XCP UR&BREATH IA: CPT

## 2022-08-01 PROCEDURE — 83735 ASSAY OF MAGNESIUM: CPT

## 2022-08-01 PROCEDURE — 87040 BLOOD CULTURE FOR BACTERIA: CPT

## 2022-08-01 PROCEDURE — 82947 ASSAY GLUCOSE BLOOD QUANT: CPT

## 2022-08-01 PROCEDURE — 6370000000 HC RX 637 (ALT 250 FOR IP)

## 2022-08-01 PROCEDURE — 96376 TX/PRO/DX INJ SAME DRUG ADON: CPT

## 2022-08-01 PROCEDURE — S5553 INSULIN LONG ACTING 5 U: HCPCS

## 2022-08-01 PROCEDURE — 80143 DRUG ASSAY ACETAMINOPHEN: CPT

## 2022-08-01 PROCEDURE — 2060000000 HC ICU INTERMEDIATE R&B

## 2022-08-01 PROCEDURE — G0378 HOSPITAL OBSERVATION PER HR: HCPCS

## 2022-08-01 PROCEDURE — 36415 COLL VENOUS BLD VENIPUNCTURE: CPT

## 2022-08-01 PROCEDURE — 80061 LIPID PANEL: CPT

## 2022-08-01 PROCEDURE — 96366 THER/PROPH/DIAG IV INF ADDON: CPT

## 2022-08-01 PROCEDURE — 2580000003 HC RX 258: Performed by: FAMILY MEDICINE

## 2022-08-01 PROCEDURE — 83036 HEMOGLOBIN GLYCOSYLATED A1C: CPT

## 2022-08-01 PROCEDURE — 84100 ASSAY OF PHOSPHORUS: CPT

## 2022-08-01 PROCEDURE — 99220 PR INITIAL OBSERVATION CARE/DAY 70 MINUTES: CPT | Performed by: INTERNAL MEDICINE

## 2022-08-01 PROCEDURE — 85025 COMPLETE CBC W/AUTO DIFF WBC: CPT

## 2022-08-01 PROCEDURE — 96367 TX/PROPH/DG ADDL SEQ IV INF: CPT

## 2022-08-01 PROCEDURE — 0202U NFCT DS 22 TRGT SARS-COV-2: CPT

## 2022-08-01 PROCEDURE — 82962 GLUCOSE BLOOD TEST: CPT

## 2022-08-01 PROCEDURE — 80048 BASIC METABOLIC PNL TOTAL CA: CPT

## 2022-08-01 PROCEDURE — 96368 THER/DIAG CONCURRENT INF: CPT

## 2022-08-01 PROCEDURE — 82010 KETONE BODYS QUAN: CPT

## 2022-08-01 PROCEDURE — 80307 DRUG TEST PRSMV CHEM ANLYZR: CPT

## 2022-08-01 RX ORDER — INSULIN LISPRO 100 [IU]/ML
0-8 INJECTION, SOLUTION INTRAVENOUS; SUBCUTANEOUS
Status: DISCONTINUED | OUTPATIENT
Start: 2022-08-01 | End: 2022-08-02 | Stop reason: HOSPADM

## 2022-08-01 RX ORDER — POLYETHYLENE GLYCOL 3350 17 G/17G
17 POWDER, FOR SOLUTION ORAL DAILY PRN
Status: DISCONTINUED | OUTPATIENT
Start: 2022-08-01 | End: 2022-08-02 | Stop reason: HOSPADM

## 2022-08-01 RX ORDER — SODIUM CHLORIDE 0.9 % (FLUSH) 0.9 %
5-40 SYRINGE (ML) INJECTION EVERY 12 HOURS SCHEDULED
Status: DISCONTINUED | OUTPATIENT
Start: 2022-08-01 | End: 2022-08-02 | Stop reason: HOSPADM

## 2022-08-01 RX ORDER — INSULIN GLARGINE-YFGN 100 [IU]/ML
20 INJECTION, SOLUTION SUBCUTANEOUS NIGHTLY
Status: DISCONTINUED | OUTPATIENT
Start: 2022-08-01 | End: 2022-08-02 | Stop reason: HOSPADM

## 2022-08-01 RX ORDER — ONDANSETRON 2 MG/ML
4 INJECTION INTRAMUSCULAR; INTRAVENOUS EVERY 6 HOURS PRN
Status: DISCONTINUED | OUTPATIENT
Start: 2022-08-01 | End: 2022-08-02 | Stop reason: HOSPADM

## 2022-08-01 RX ORDER — ACETAMINOPHEN 650 MG/1
650 SUPPOSITORY RECTAL EVERY 6 HOURS PRN
Status: DISCONTINUED | OUTPATIENT
Start: 2022-08-01 | End: 2022-08-02 | Stop reason: HOSPADM

## 2022-08-01 RX ORDER — PROMETHAZINE HYDROCHLORIDE 12.5 MG/1
12.5 TABLET ORAL EVERY 6 HOURS PRN
Status: DISCONTINUED | OUTPATIENT
Start: 2022-08-01 | End: 2022-08-02 | Stop reason: HOSPADM

## 2022-08-01 RX ORDER — ACETAMINOPHEN 325 MG/1
650 TABLET ORAL EVERY 6 HOURS PRN
Status: DISCONTINUED | OUTPATIENT
Start: 2022-08-01 | End: 2022-08-02 | Stop reason: HOSPADM

## 2022-08-01 RX ORDER — SODIUM CHLORIDE 9 MG/ML
INJECTION, SOLUTION INTRAVENOUS PRN
Status: DISCONTINUED | OUTPATIENT
Start: 2022-08-01 | End: 2022-08-02 | Stop reason: HOSPADM

## 2022-08-01 RX ORDER — DEXTROSE MONOHYDRATE 100 MG/ML
INJECTION, SOLUTION INTRAVENOUS CONTINUOUS PRN
Status: DISCONTINUED | OUTPATIENT
Start: 2022-08-01 | End: 2022-08-02 | Stop reason: HOSPADM

## 2022-08-01 RX ORDER — AMLODIPINE BESYLATE 10 MG/1
10 TABLET ORAL DAILY
Status: DISCONTINUED | OUTPATIENT
Start: 2022-08-01 | End: 2022-08-02 | Stop reason: HOSPADM

## 2022-08-01 RX ORDER — INSULIN LISPRO 100 [IU]/ML
0-4 INJECTION, SOLUTION INTRAVENOUS; SUBCUTANEOUS NIGHTLY
Status: DISCONTINUED | OUTPATIENT
Start: 2022-08-01 | End: 2022-08-02 | Stop reason: HOSPADM

## 2022-08-01 RX ORDER — SODIUM CHLORIDE 0.9 % (FLUSH) 0.9 %
10 SYRINGE (ML) INJECTION EVERY 12 HOURS SCHEDULED
Status: DISCONTINUED | OUTPATIENT
Start: 2022-08-01 | End: 2022-08-01

## 2022-08-01 RX ORDER — ENOXAPARIN SODIUM 100 MG/ML
40 INJECTION SUBCUTANEOUS DAILY
Status: DISCONTINUED | OUTPATIENT
Start: 2022-08-01 | End: 2022-08-02 | Stop reason: HOSPADM

## 2022-08-01 RX ORDER — ATORVASTATIN CALCIUM 20 MG/1
20 TABLET, FILM COATED ORAL NIGHTLY
Status: DISCONTINUED | OUTPATIENT
Start: 2022-08-01 | End: 2022-08-02 | Stop reason: HOSPADM

## 2022-08-01 RX ORDER — SODIUM CHLORIDE 0.9 % (FLUSH) 0.9 %
10 SYRINGE (ML) INJECTION PRN
Status: DISCONTINUED | OUTPATIENT
Start: 2022-08-01 | End: 2022-08-01

## 2022-08-01 RX ORDER — SODIUM CHLORIDE 0.9 % (FLUSH) 0.9 %
5-40 SYRINGE (ML) INJECTION PRN
Status: DISCONTINUED | OUTPATIENT
Start: 2022-08-01 | End: 2022-08-02 | Stop reason: HOSPADM

## 2022-08-01 RX ADMIN — SODIUM CHLORIDE: 9 INJECTION, SOLUTION INTRAVENOUS at 00:35

## 2022-08-01 RX ADMIN — Medication 10 MEQ: at 05:43

## 2022-08-01 RX ADMIN — Medication 10 MEQ: at 04:38

## 2022-08-01 RX ADMIN — SODIUM CHLORIDE, PRESERVATIVE FREE 10 ML: 5 INJECTION INTRAVENOUS at 21:00

## 2022-08-01 RX ADMIN — Medication 10 MEQ: at 07:23

## 2022-08-01 RX ADMIN — INSULIN HUMAN 0.1 UNITS/KG/HR: 100 INJECTION, SOLUTION PARENTERAL at 00:26

## 2022-08-01 RX ADMIN — AMLODIPINE BESYLATE 10 MG: 10 TABLET ORAL at 09:47

## 2022-08-01 RX ADMIN — DEXTROSE AND SODIUM CHLORIDE: 5; 450 INJECTION, SOLUTION INTRAVENOUS at 04:34

## 2022-08-01 RX ADMIN — INSULIN HUMAN 0.05 UNITS/KG/HR: 100 INJECTION, SOLUTION PARENTERAL at 02:30

## 2022-08-01 RX ADMIN — POTASSIUM BICARBONATE 40 MEQ: 782 TABLET, EFFERVESCENT ORAL at 10:43

## 2022-08-01 RX ADMIN — INSULIN LISPRO 6 UNITS: 100 INJECTION, SOLUTION INTRAVENOUS; SUBCUTANEOUS at 16:25

## 2022-08-01 RX ADMIN — INSULIN GLARGINE-YFGN 20 UNITS: 100 INJECTION, SOLUTION SUBCUTANEOUS at 09:47

## 2022-08-01 ASSESSMENT — ENCOUNTER SYMPTOMS
NAUSEA: 0
DIARRHEA: 0
CHEST TIGHTNESS: 0
PHOTOPHOBIA: 0
CONSTIPATION: 0
VOMITING: 0
SHORTNESS OF BREATH: 0
COUGH: 0
ABDOMINAL PAIN: 0

## 2022-08-01 ASSESSMENT — PAIN - FUNCTIONAL ASSESSMENT: PAIN_FUNCTIONAL_ASSESSMENT: NONE - DENIES PAIN

## 2022-08-01 NOTE — ED NOTES
Tried calling Nuclear Medicine twice for VQ scan for Pt. No answer at this time.       Elza Litten, RN  07/31/22 2112

## 2022-08-01 NOTE — PROGRESS NOTES
I saw and examined the patient in the ICU in the morning. I discussed the case in detail with resident staff, nursing and respiratory therapist as needed. I reviewed the pertinent laboratory studies, imaging studies, and records. Plea     Past medical history, surgical history, family history, and social history are unchanged unless stated in the history of present illness. I have reviewed the patient's medications and allergies. Please see resident note for further details.     Diabetic ketoacidosis  COVID-19 infection  Acute kidney injury    Insulin GGT  Transition to subcu insulin when anion gap closes  IVF    Justin Rahman MD MS  Pulmonary & [address] [facility]

## 2022-08-01 NOTE — PROGRESS NOTES
Reason for consult: DKA    A1C: 14.8 (8/1/22)     [] Not available         Patient states the following concerns/barriers to diabetes self-management:     [x] None       [] Medication cost   [] Food cost/availability        [] Reading  [] Hearing   [] Vision                [] Work    [] Transportation  [] No insurance  [] Physical limitations    [] Other:      Diabetes survival packet provided to:   [x] Patient     [] Other:    Information reviewed:   Definition of diabetes   Target glucose ranges/A1C   Self-monitoring of blood glucose   Prevention/symptoms/treatment of hypo-/hyperglycemia   Medication adherence   The plate method/meal planning guidelines   The benefits of exercise and recommendations   Reducing the risk of chronic complications    Patient provided educational material, \"Diabetes Survival Packet\". Alternative beverages to help with glycemic control reviewed. Diabetes plate method for meal planning encouraged. Benefits of physical activity or increase activities of daily living on blood glucose control provided. Diabetes medications, mechanism of action, timing and side effects reviewed. Reviewed Sick Day Checklist.   Patient verbalizes understanding of teaching at this time. Contact information provided for future questions and concerns. Patient would likely benefit from [facility] classes after discharge.             Post-education Assessment  [x]  Attentive to teaching  [x]  Answered questions appropriately when asked   [x]  Seems able to apply concepts to daily lifestyle  [x]  Seems motivated to do well  [x]  Verbalized an understanding of the plate method for portion control   [x]  Verbalized an understanding of prescribed antidiabetes medications   [x]  Verbalized an understanding of target glucose ranges/A1C level  [x]  Expresses an intent to comply with treatment plan   []  Showed very little interest in complying with treatment plan   []  Seems to have trouble applying concepts to daily lifestyle        Recommendations:   [x] Carbohydrate-controlled diet    [x] Script for glucometer and supplies (per preference of patient's insurance)- Patient states that he has a working meter, does not know if Test Strips are current. [x] Script for insulin pens and pen needles (if insulin is ordered at discharge for home use)   [] Consult to social work: patient has no insurance or has financial hardship  [] Inpatient consult to endocrinologist   [] Follow up with endocrinologist as an outpatient   [] Home healthcare nursing to increase compliance to treatment plan   [x] Script for outpatient diabetes education classes (from doctor) if desired    Thank you for this consult.

## 2022-08-01 NOTE — PLAN OF CARE
Contacted by Dr. Zenon Nicole regarding transition of care for this patient. His PCP is Dr. Dillon Orr and is following with Internal medicine clinic. Dr. Henry Ortiz aware of new admission. Saw patient at bedside, now with appetite, no nausea or vomiting. He has been bridged with 20 Lantus and MDSS. Will continue to follow.      Alexei Lynch MD

## 2022-08-01 NOTE — ED NOTES
This RN and RNKaylen at bedside checking blood sugar and adjusting insulin dosage. Pt asked when he was going home. Explained to pt that he was being admitted to ICU. Pt sitting in bed blaring rap music and singing loudly to music. This RN asked pt if he could lower his music while we were adjusting the dosage since so that no errors were made. Pt immediately snapped back at RN and stated \"no I'm not lowering my music, you just don't like it because it's not white people music. \" This RN explained that it had nothing to do with the type of music, but it was loud and RN's were making sure that medication calculation was being done correctly. Pt stated \"good if you mess it up then I can get some money from the hospital.\" Pt then stated \"I hear nurses all the type playing music, you just don't like it because it's not white people country music. \" Once again explained that the type of music had nothing to do with it. Pt continued to argue and stated he was leaving music on.  Pt did eventually lower music while RN's were at bedside     Amarjit Allen RN  08/01/22 [id number]

## 2022-08-01 NOTE — PROGRESS NOTES
Assumed care of patient admitted earlier today by colleague, chart reviewed and important findings noted. Notidued by ICU team that pt belongs to medicine team. Contacted them and they have accepted under Dr Keira armendariz.     -MGS

## 2022-08-01 NOTE — ED NOTES
Found patient in room 24; no report received; assumed care of pt at this time; updated on patient plan of care from Dr Alden Valero, TEODORO  07/31/22 [id number]

## 2022-08-01 NOTE — ED PROVIDER NOTES
ED Attending shared visit  CC: yes       [facility]  Department of Emergency Medicine   ED  Encounter Note  Admit Date/RoomTime: 2022  8:06 PM  ED Room:   NAME: Benji Ramirez  : 1985  MRN: [id number]     Chief Complaint:  Fatigue (Pt states he cant keep any food or liquids down for multiple days. Pt says he tested positive for covid last Wednesday.), Shortness of Breath, and Dizziness (Pts wife states he sleeps constantly on and off throughout the day)    HISTORY OF PRESENT ILLNESS        Benji Ramirez is a 39 y.o. male who presents to the ED by private vehicle for post COVID symptoms, beginning 5 to 7 days ago. Patient states about a week ago patient was diagnosed with COVID and since has not been eating or drinking well and still feels extremely fatigued. Patient states he feels like he is losing a little weight. Patient states he feels short of breath, dizzy overall abdominal pain the complaint has been worsening and are moderate in severity. Patient states he just has \"radiation of pain and muscle pain all over. \"  Patient states he is sleeping on and off constantly and he cannot hold \"liquid down for multiple days. \"  Patient states he still making urine and having bowel movements. Patient denies any falls or trauma. Patient denies any new medications. ROS   Pertinent positives and negatives are stated within HPI, all other systems reviewed and are negative. Past Medical History:  has a past medical history of Chest pain, HTN (hypertension), Hydrocele, left, and Type 1 diabetes mellitus with diabetic chronic kidney disease ([facility] [address] Surgical History:  has a past surgical history that includes Hydrocele surgery (2010). Social History:  reports that he has been smoking cigars. He has a 8.00 pack-year smoking history. He has never used smokeless tobacco. He reports current alcohol use. He reports that he does not use drugs.     Family History: family history includes Crohn's Disease in his sister; Diabetes in his maternal grandmother and mother; Heart Failure in his maternal grandmother; Hypertension in his mother and sister. Allergies: Patient has no known allergies. PHYSICAL EXAM   Oxygen Saturation Interpretation: Normal on room air analysis. ED Triage Vitals   BP Temp Temp Source Heart Rate Resp SpO2 Height Weight   07/31/22 1941 07/31/22 1934 07/31/22 1934 07/31/22 1934 07/31/22 1941 07/31/22 1934 -- --   127/69 98 °F (36.7 °C) Oral 82 16 97 %           Physical Exam  Constitutional/General: Alert and oriented x3, well appearing, non toxic  HEENT:  NC/NT. PERRLA,  Airway patent. Neck: Supple, full ROM, non tender to palpation in the midline, no stridor, no crepitus, no meningeal signs  Respiratory: Lungs clear to auscultation bilaterally, no wheezes, rales, or rhonchi. Not in respiratory distress  CV:  Regular rate. Regular rhythm. No murmurs, gallops, or rubs. 2+ distal pulses  Chest: No chest wall tenderness  GI:  Abdomen Soft, Non tender, Non distended. +BS. No rebound, guarding, or rigidity. No pulsatile masses. Musculoskeletal: Moves all extremities x 4. Warm and well perfused, no clubbing, cyanosis, or edema. Capillary refill <3 seconds  Integument: skin warm and dry. No rashes.    Lymphatic: no lymphadenopathy noted  Neurologic: GCS 15, no focal deficits, symmetric strength 5/5 in the upper and lower extremities bilaterally  Psychiatric: Normal Affect    Lab / Imaging Results   (All laboratory and radiology results have been personally reviewed by myself)  Labs:  Results for orders placed or performed during the hospital encounter of 07/31/22   Comprehensive Metabolic Panel   Result Value Ref Range    Sodium 128 (L) 132 - 146 mmol/L    Potassium 4.5 3.5 - 5.0 mmol/L    Chloride 87 (L) 98 - 107 mmol/L    CO2 22 22 - 29 mmol/L    Anion Gap 19 (H) 7 - 16 mmol/L    Glucose 552 (HH) 74 - 99 mg/dL    BUN 74 (H) 6 - 20 mg/dL    Creatinine 3.7 (H) 0.7 - 1.2 mg/dL    GFR Non-African American 18 >=60 mL/min/1.73    GFR African American 22     Calcium 9.5 8.6 - 10.2 mg/dL    Total Protein 7.5 6.4 - 8.3 g/dL    Albumin 4.2 3.5 - 5.2 g/dL    Total Bilirubin 0.8 0.0 - 1.2 mg/dL    Alkaline Phosphatase 85 40 - 129 U/L    ALT 14 0 - 40 U/L    AST 9 0 - 39 U/L   CBC with Auto Differential   Result Value Ref Range    WBC 5.2 4.5 - 11.5 E9/L    RBC 4.67 3.80 - 5.80 E12/L    Hemoglobin 14.5 12.5 - 16.5 g/dL    Hematocrit 41.1 37.0 - 54.0 %    MCV 88.0 80.0 - 99.9 fL    MCH 31.0 26.0 - 35.0 pg    MCHC 35.3 (H) 32.0 - 34.5 %    RDW 11.9 11.5 - 15.0 fL    Platelets 243 (L) 794 - 450 E9/L    MPV 12.9 (H) 7.0 - 12.0 fL    Neutrophils % 63.7 43.0 - 80.0 %    Immature Granulocytes % 0.2 0.0 - 5.0 %    Lymphocytes % 26.2 20.0 - 42.0 %    Monocytes % 8.5 2.0 - 12.0 %    Eosinophils % 1.0 0.0 - 6.0 %    Basophils % 0.4 0.0 - 2.0 %    Neutrophils Absolute 3.28 1.80 - 7.30 E9/L    Immature Granulocytes # 0.01 E9/L    Lymphocytes Absolute 1.35 (L) 1.50 - 4.00 E9/L    Monocytes Absolute 0.44 0.10 - 0.95 E9/L    Eosinophils Absolute 0.05 0.05 - 0.50 E9/L    Basophils Absolute 0.02 0.00 - 0.20 E9/L   Lipase   Result Value Ref Range    Lipase 105 (H) 13 - 60 U/L   Lactic Acid   Result Value Ref Range    Lactic Acid 1.3 0.5 - 2.2 mmol/L   Urinalysis with Microscopic   Result Value Ref Range    Color, UA Yellow Straw/Yellow    Clarity, UA Clear Clear    Glucose, Ur >=1000 (A) Negative mg/dL    Bilirubin Urine Negative Negative    Ketones, Urine TRACE (A) Negative mg/dL    Specific Gravity, UA 1.010 1.005 - 1.030    Blood, Urine TRACE-INTACT Negative    pH, UA 5.5 5.0 - 9.0    Protein, UA Negative Negative mg/dL    Urobilinogen, Urine 0.2 <2.0 E.U./dL    Nitrite, Urine Negative Negative    Leukocyte Esterase, Urine Negative Negative    WBC, UA 0-1 0 - 5 /HPF    RBC, UA 0-1 0 - 2 /HPF    Epithelial Cells, UA RARE /HPF    Bacteria, UA RARE (A) None Seen /HPF   Troponin   Result Value Ref Range    Troponin, High Sensitivity 52 (H) 0 - 11 ng/L   D-Dimer, Quantitative   Result Value Ref Range    D-Dimer, Quant 954 ng/mL DDU   pH, venous   Result Value Ref Range    pH, Vinod 7.30 (L) 7.35 - 7.45   Beta-Hydroxybutyrate   Result Value Ref Range    Beta-Hydroxybutyrate 1.78 (H) 0.02 - 0.27 mmol/L   Troponin   Result Value Ref Range    Troponin, High Sensitivity 42 (H) 0 - 11 ng/L   Basic Metabolic Panel   Result Value Ref Range    Sodium 130 (L) 132 - 146 mmol/L    Potassium 5.4 (H) 3.5 - 5.0 mmol/L    Chloride 92 (L) 98 - 107 mmol/L    CO2 19 (L) 22 - 29 mmol/L    Anion Gap 19 (H) 7 - 16 mmol/L    Glucose 541 (HH) 74 - 99 mg/dL    BUN 69 (H) 6 - 20 mg/dL    Creatinine 3.1 (H) 0.7 - 1.2 mg/dL    GFR Non-African American 23 >=60 mL/min/1.73    GFR African American 27     Calcium 8.8 8.6 - 10.2 mg/dL   POCT Glucose   Result Value Ref Range    Meter Glucose >500 (H) 74 - 99 mg/dL   POCT Glucose   Result Value Ref Range    Meter Glucose >500 (H) 74 - 99 mg/dL   EKG 12 Lead   Result Value Ref Range    Ventricular Rate 66 BPM    Atrial Rate 66 BPM    P-R Interval 178 ms    QRS Duration 102 ms    Q-T Interval 428 ms    QTc Calculation (Bazett) 448 ms    P Axis 55 degrees    R Axis 54 degrees    T Axis 87 degrees     Imaging: All Radiology results interpreted by Radiologist unless otherwise noted. NM LUNG SCAN PERFUSION ONLY   Final Result   Low probability for pulmonary embolism               XR CHEST (2 VW)   Final Result   No acute cardiopulmonary process.              ED Course / Medical Decision Making     Medications   0.9 % sodium chloride bolus (1,000 mLs IntraVENous New Bag 7/31/22 9174)   dextrose bolus 10% 125 mL (has no administration in time range)     Or   dextrose bolus 10% 250 mL (has no administration in time range)   potassium chloride 10 mEq/100 mL IVPB (Peripheral Line) (has no administration in time range)   magnesium sulfate 1000 mg in dextrose 5% 100 mL IVPB (has no administration in time range)   sodium phosphate 10 mmol in sodium chloride 0.9 % 250 mL IVPB (has no administration in time range)     Or   sodium phosphate 15 mmol in dextrose 5 % 250 mL IVPB (has no administration in time range)     Or   sodium phosphate 20 mmol in dextrose 5 % 500 mL IVPB (has no administration in time range)   0.9 % sodium chloride bolus (has no administration in time range)     Followed by   0.9 % sodium chloride infusion (has no administration in time range)   dextrose 5 % and 0.45 % sodium chloride infusion (has no administration in time range)   insulin regular (HUMULIN R;NOVOLIN R) 100 Units in sodium chloride 0.9 % 100 mL infusion (has no administration in time range)   0.9 % sodium chloride bolus (0 mLs IntraVENous Stopped 7/31/22 2043)   ondansetron (ZOFRAN) injection 4 mg (4 mg IntraVENous Given 7/31/22 1959)   insulin regular (HUMULIN R;NOVOLIN R) injection 10 Units (10 Units IntraVENous Given 7/31/22 2215)   technetium albumin aggregated (MAA) solution 6 millicurie (6 millicuries IntraVENous Given 7/31/22 2234)        Re-examination:  7/31/22       Time: 2024 IV fluids started, patient placed in room awaiting results, Zofran given    2030 Case staffed with my attending ported more fluids given, insulin given. Will give fluids and see if gap closes and repeat BMP patient is agreeable with plan    Consult(s):   IP CONSULT TO DIABETES EDUCATOR    Procedure(s):   None    MDM:   Patient is a 34-year-old that is presenting with fatigue, nausea, vomiting, diffuse abdominal pain, shortness of breath and dizziness. Patient had a recent diagnosis of COVID. Patient is a type I diabetic uncontrolled. Patient is not very compliant. Patient had a thorough examination and was she was found to be benign. Patient had full lab work and was found to be in DKA. Fluids were given as well as insulin to try that shows a gap which were unsuccessful. Patient was started on a insulin drip and will be admitted to the ICU. All calls placed by my attending, patient was accepted to the floor. Patient is in stable condition and vitals have remained within normal limits. Patient is stable for the ICU    Plan of Care/Counseling:  Chuck Mcardle, PA-C reviewed today's visit with the patient in addition to providing specific details for the plan of care and counseling regarding the diagnosis and prognosis. Questions are answered at this time and are agreeable with the plan. ASSESSMENT     1. Type 1 diabetes mellitus with ketoacidosis without coma ([facility] [address]      PLAN   Admit to ICU  Patient condition is stable    New Medications     New Prescriptions    No medications on file     Electronically signed by Chuck Mcardle, PA-C   DD: 7/31/22  **This report was transcribed using voice recognition software. Every effort was made to ensure accuracy; however, inadvertent computerized transcription errors may be present.   END OF ED PROVIDER NOTE       Chuck Mcardle, PA-C  07/31/22 7093

## 2022-08-01 NOTE — CONSULTS
Medical Intensive Care Unit     [facility]  Resident Consult Note    Date and time: 8/1/2022 12:25 PM  Patient's name:  Mira Rowe  Medical Record Number: [id number]  Patient's account/billing number: [de-identified]  Patient's YOB: 1985  Age: 39 y.o. Date of Admission: 7/31/2022  8:06 PM  Length of stay during current admission: 1    Primary Care Physician: Roxanna Raymundo MD  ICU Attending Physician: Dr. Todd Calle Status: Full Code    Reason for ICU admission: DKA    History of Present Illness:   Patient is a 78-year-old male with past medical history of type 2 diabetes mellitus and on long-term insulin, hypertension and hyperlipidemia. 1 week ago patient started to feel unwell has had sick contacts with family members at home including his son, went for COVID test and was positive. Patient states that over the past week he has felt fatigued and become more nauseous including vomiting he states he is unable to keep any food down, last solid meal was on July 28, he has had very little to eat and drink since then. Patient states that he has not had any cough fever headache or congestion. Patient is also been without his insulin since at least 3 months ago, possibly longer. Was following with endocrinology for which she had a regimen of 30 units Lantus nightly along with a sliding scale. Presented to the ED last night with blood glucose of over 500 with an elevated anion gap, was started on DKA protocol and brought to the unit. By the time patient arrived in the ICU he had gap closure time 1, labs were repleted and gap was closed at that time. [facility]. Patient also has a long standing history of chronic kidney disease with elevated creatinine baseline around 3. At this time patient is bridged from DKA protocol and is stable for transfer to telemetry.      CURRENT VENTILATION STATUS:   [] Ventilator  [] BIPAP  [] Nasal Cannula [x] Room Air      SECRETIONS   Amount:  [] Small [] Moderate  [] Large [x] None    Color:     [] White [] Colored  [] Bloody    SEDATION:  RAAS Score:  [] Propofol gtt  [] Versed gtt  [] Fentanyl gtt  [x] No Sedation    PARALYZED:  [x] No    [] Yes    VASOPRESSORS:  [x] No    [] Yes    If yes -   [] Levophed       [] Dopamine     [] Vasopressin       [] Dobutamine  [] Phenylephrine         [] Epinephrine    CENTRAL LINES:     [x] No   [] Yes   (Date of Insertion:   )           If yes -     [] Right IJ     [] Left IJ [] Right Femoral [] Left Femoral                   [] Right Subclavian [] Left Subclavian     JOYCE'S CATHETER:   [x] No   [] Yes  (Date of Insertion:   )     URINE OUTPUT:            [x] Good   [] Low              [] Anuric    Review of Systems   Constitutional:  Negative for fatigue and fever. HENT:  Negative for congestion. Eyes:  Negative for photophobia and visual disturbance. Respiratory:  Negative for cough, chest tightness and shortness of breath. Cardiovascular:  Negative for chest pain, palpitations and leg swelling. Gastrointestinal:  Negative for abdominal pain, constipation, diarrhea, nausea and vomiting. Genitourinary:  Negative for difficulty urinating. Musculoskeletal:  Negative for arthralgias. Neurological:  Negative for dizziness, weakness, light-headedness and headaches.      OBJECTIVE:     VITAL SIGNS:  /69   Pulse 63   Temp 98 °F (36.7 °C) (Oral)   Resp 16   Wt 210 lb (95.3 kg)   SpO2 97%   BMI 30.13 kg/m²   Tmax over 24 hours:  Temp (24hrs), Av °F (36.7 °C), Min:98 °F (36.7 °C), Max:98 °F (36.7 °C)      Patient Vitals for the past 6 hrs:   BP Temp Temp src Pulse Resp SpO2 Weight   22 1215 -- -- -- -- -- -- 210 lb (95.3 kg)   22 0900 122/69 -- -- -- -- 97 % --   22 0800 137/86 98 °F (36.7 °C) Oral 63 16 99 % --   22 0630 114/64 -- -- 63 16 97 % --         Intake/Output Summary (Last 24 hours) at 2022 7326  Last data filed at 8/1/2022 0718  Gross per 24 hour   Intake 2200 ml   Output --   Net 2200 ml     Wt Readings from Last 2 Encounters:   08/01/22 210 lb (95.3 kg)   02/11/21 213 lb (96.6 kg)     Body mass index is 30.13 kg/m². Physical Exam  Constitutional:       Appearance: Normal appearance. Eyes:      Pupils: Pupils are equal, round, and reactive to light. Cardiovascular:      Rate and Rhythm: Normal rate and regular rhythm. Pulses: Normal pulses. Heart sounds: Normal heart sounds. No murmur heard. Pulmonary:      Effort: Pulmonary effort is normal.      Breath sounds: Normal breath sounds. No wheezing or rhonchi. Abdominal:      General: Abdomen is flat. There is no distension. Palpations: Abdomen is soft. Tenderness: There is no abdominal tenderness. Musculoskeletal:         General: Normal range of motion. Cervical back: Normal range of motion. Skin:     General: Skin is warm and dry. Capillary Refill: Capillary refill takes less than 2 seconds. Neurological:      General: No focal deficit present. Mental Status: He is alert and oriented to person, place, and time.    Psychiatric:         Mood and Affect: Mood normal.         Behavior: Behavior normal.        Any additional physical findings:    MEDICATIONS:  Scheduled Meds:   enoxaparin  40 mg SubCUTAneous Daily    amLODIPine  10 mg Oral Daily    sodium chloride flush  5-40 mL IntraVENous 2 times per day    insulin glargine  20 Units SubCUTAneous Nightly    insulin lispro  0-8 Units SubCUTAneous TID WC    insulin lispro  0-4 Units SubCUTAneous Nightly    atorvastatin  20 mg Oral Nightly     Continuous Infusions:   sodium chloride      dextrose       PRN Meds:   sodium chloride, , PRN  promethazine, 12.5 mg, Q6H PRN   Or  ondansetron, 4 mg, Q6H PRN  polyethylene glycol, 17 g, Daily PRN  acetaminophen, 650 mg, Q6H PRN   Or  acetaminophen, 650 mg, Q6H PRN  sodium chloride flush, 5-40 mL, PRN  glucose, 4 tablet, infection  History of type 2 diabetes mellitus with long-term insulin use: Uses Lantus 30 units nightly and medium dose sliding scale previously  History of stage III chronic kidney disease baseline creatinine between 2 and 3  Elevated troponin likely secondary to chronic kidney disease  High anion gap metabolic acidosis likely 2/2 DKA  History of hypertension  History of hyperlipidemia    Plan:  Patient is off DKA protocol  Bridged to Lantus 20 units with medium dose sliding scale  Troponins trending down repeat EKG shows no new changes or infarcts  Blood pressure under control slowly resume home blood pressure medications, will start with amlodipine and escalate from there  Creatinine at baseline, kidney disease stable at this time  Patient can transfer out of ICU and to general medicine floors today       WEAN PER PROTOCOL:  [x] No   [] Yes  [] N/A    ICU PROPHYLAXIS:  Stress ulcer:  [x] PPI Agent  [] [id number] [] Sucralfate  [] Other:  VTE:   [x] Enoxaparin  [] Unfract. Heparin Subcut  [] EPC Cuffs    NUTRITION:  [] NPO [] Tube Feeding (Specify: ) [] TPN  [x] PO (Diet: ADULT DIET;  Regular; 4 carb choices (60 gm/meal))    INSULIN DRIP:   [] No   [x] Yes    CONSULTATION NEEDED:   [x] No   [] Yes    FAMILY UPDATED:    [x] No   [] Yes    TRANSFER OUT OF ICU:   [] No   [x] Yes    Dorinda Luther MD, PGY-2  Attending Physician: Dr. Laura Colin  8/1/2022, 12:25 PM

## 2022-08-01 NOTE — H&P
Hospitalist History & Physical      PCP: Patricio Langford MD    Date of Service: Pt seen/examined on 8/1/2022     Chief Complaint:  had concerns including Fatigue (Pt states he cant keep any food or liquids down for multiple days. Pt says he tested positive for covid last Wednesday.), Shortness of Breath, and Dizziness (Pts wife states he sleeps constantly on and off throughout the day). History Of Present Illness:    Mr. Ruby More, a 39y.o. year old male  who  has a past medical history of Chest pain, HTN (hypertension), Hydrocele, left, and Type 1 diabetes mellitus with diabetic chronic kidney disease ([facility] [address] Patient presented to the emergency department with concerns of fatigue as well as nausea and vomiting. Unable to keep food or liquids down for the last several days. Patient reports he tested positive for COVID last Wednesday. Also reporting some shortness of breath and dizziness. Patient has a history of type 1 diabetes. Vital signs within normal limits and stable. Patient is afebrile. Laboratory studies demonstrate sodium 130, potassium 5.4, BUN 69, creatinine 3.1, anion gap 19, glucose greater than 500, troponin 42, beta hydroxybutyrate 1.78, venous pH 7.30. Patient was started on insulin infusion. Patient admitted to the ICU. Past Medical History:   Diagnosis Date    Chest pain     HTN (hypertension)     Hydrocele, left 10/28/2010    Type 1 diabetes mellitus with diabetic chronic kidney disease [facility])        Past Surgical History:   Procedure Laterality Date    HYDROCELE EXCISION  November 2010       Prior to Admission medications    Medication Sig Start Date End Date Taking? Authorizing Provider   blood glucose test strips (ONETOUCH ULTRA) strip 1 each by In Vitro route daily As needed.  5/28/21   Francine Sweet MD   Lancets ([address]) [address] To test daily 5/28/21   Francine Sweet MD   Blood Glucose Monitoring Suppl (ONE TOUCH ULTRA 2) w/Device KIT Use to check BG 4x each day 5/24/21   Lucila Quintanilla MD   glimepiride (AMARYL) 4 MG tablet TAKE 1 TABLET BY MOUTH EVERY DAY BEFORE BREAKFAST 5/20/21   Lucila Quintanilla MD   glucose monitoring kit (FREESTYLE) monitoring kit Use once.  3/11/21   Lucila Quintanilla MD   insulin glargine (LANTUS SOLOSTAR) 100 UNIT/ML injection pen Inject 30 Units into the skin nightly 2/11/21   Lcuila Quintanilla MD   insulin lispro, 1 Unit Dial, (HUMALOG KWIKPEN) 100 UNIT/ML SOPN Take 5 units if BG >300 mg/dl, do not take more freq than every 4 hours, max dose of 30 units/day  Patient not taking: Reported on 3/11/2021 2/11/21   Lucila Quintanilla MD   glucagon 1 MG injection  to inject 1 mg into the muscle or under the skin if you become unconscious for any reason other than suspected high BG 9/26/20   Lucila Quintanilla MD   ASPIRIN LOW DOSE 81 MG EC tablet TAKE 1 TABLET BY MOUTH EVERY DAY 4/13/20   Isaiah Perez MD   metoprolol succinate (TOPROL XL) 200 MG extended release tablet TAKE 1 TABLET BY MOUTH EVERY DAY 4/13/20   Isaiah Bliss MD   atorvastatin (LIPITOR) 20 MG tablet Take 1 tablet by mouth daily 1/17/20   Lucila Quintanilla MD   Insulin Syringe-Needle U-100 30G X 1/2\" 1 ML MISC 1 each by Does not apply route daily Use as directed 1/17/20   Lucila Quintanilla MD   Insulin Pen Needle 32G X 4 MM MISC Use to take long acting insulin once each day 1/17/20   Lucila Quintanilla MD   glucagon 1 MG injection Infuse 1 mg intravenously once for 1 dose Administer 1 mg under skin if blood sugars go <70 with in ability to take glucose tablets 1/17/20 1/17/20  Lucila Quintanilla MD   glucose [facility] GLUCOSE) 4 g chewable tablet Take 4 tablets by mouth as needed for Low blood sugar (<70 mg/dl) every 15 minutes until BG rises to >70mg/dl 1/17/20   Lucila Quintanilla MD   amLODIPine (NORVASC) 10 MG tablet TAKE 1 TABLET BY MOUTH EVERY DAY 11/19/19   Marlene Scott MD   bumetanide (BUMEX) 1 MG tablet TAKE 1 TABLET BY MOUTH TWICE A DAY 4/29/19   Historical Provider, MD   FLUoxetine (PROZAC) 20 MG capsule TAKE 1 CAPSULE BY MOUTH EVERY DAY IN THE MORNING 4/14/19   Historical Provider, MD   KLOR-CON M20 20 MEQ extended release tablet TAKE 1 TABLET BY MOUTH EVERY OTHER DAY 4/16/19   Historical Provider, MD   losartan (COZAAR) 100 MG tablet TAKE 1 TABLET BY MOUTH EVERY DAY 5/10/19   Ayesha Orellana MD   minoxidil (LONITEN) 10 MG tablet TAKE 1 TABLET BY MOUTH THREE TIMES A DAY 4/8/19   Natalio Easley,    FREESTYLE LANCETS MISC 1 each by Does not apply route daily 3/29/19   Pamela Torrez MD   Lancets MISC 1 each by Does not apply route 4 times daily 12/7/18   Pamela Torrez MD   LATUDA 20 MG TABS tablet TAKE 1 TABLET BY MOUTH EVERY DAY EVERY AFTERNOON 10/23/18   Historical Provider, MD   traZODone (DESYREL) 50 MG tablet TAKE 1/2 TABLET BY MOUTH AT BEDTIME 10/23/18   Historical Provider, MD   ergocalciferol (DRISDOL) 23655 units capsule Take with meal 2x/week  Patient taking differently: once a week Take with meal 2x/week 11/7/18   Goyo Harp MD   amphetamine-dextroamphetamine (ADDERALL) 5 MG tablet TAKE 1 TABLET BY MOUTH TWICE A DAY 9/12/18   Historical Provider, MD   sodium bicarbonate 650 MG tablet Take 1 tablet by mouth 3 times daily 9/23/18   Homar Lind MD   Blood Pressure Monitoring (ADULT BLOOD PRESSURE CUFF LG) KIT 1 kit by Does not apply route daily 9/6/16   Ayesha Orellana MD         Allergies:  Patient has no known allergies. Social History:    TOBACCO:   reports that he has been smoking cigars. He has a 8.00 pack-year smoking history. He has never used smokeless tobacco.  ETOH:   reports current alcohol use. Family History:    Reviewed in detail and negative for DM, CAD, Cancer, CVA.  Positive as follows\"      Problem Relation Age of Onset    Diabetes Maternal Grandmother     Heart Failure Maternal Grandmother     Diabetes Mother     Hypertension Mother     Crohn's Disease Sister     Hypertension Sister REVIEW OF SYSTEMS:   Pertinent positives as noted in the HPI. All other systems reviewed and negative. PHYSICAL EXAM:  BP (!) 140/80   Pulse 67   Temp 98 °F (36.7 °C) (Oral)   Resp 14   Wt 210 lb (95.3 kg)   SpO2 96%   BMI 30.13 kg/m²   General appearance: No apparent distress, appears stated age and cooperative. HEENT: Normal cephalic, atraumatic without obvious deformity. Pupils equal, round, and reactive to light. Extra ocular muscles intact. Conjunctivae/corneas clear. Neck: Supple, with full range of motion. No jugular venous distention. Trachea midline. Respiratory: Clear to auscultation bilaterally  Cardiovascular: Regular rate and rhythm  Abdomen: Soft, nontender, nondistended  Musculoskeletal: No clubbing, cyanosis, edema of bilateral lower extremities. Brisk capillary refill. Skin: Normal skin color. No rashes or lesions. Neurologic:  Neurovascularly intact without any focal sensory/motor deficits. Cranial nerves: II-XII intact, grossly non-focal.  Psychiatric: Alert and oriented, thought content appropriate, normal insight    Reviewed EKG and CXR personally      CBC:   Recent Labs     07/31/22 1952   WBC 5.2   RBC 4.67   HGB 14.5   HCT 41.1   MCV 88.0   RDW 11.9   *     BMP:   Recent Labs     07/31/22 1952 07/31/22  2221   * 130*   K 4.5 5.4*   CL 87* 92*   CO2 22 19*   BUN 74* 69*   CREATININE 3.7* 3.1*     LFT:  Recent Labs     07/31/22 1952   PROT 7.5   ALKPHOS 85   ALT 14   AST 9   BILITOT 0.8   LIPASE 105*     CE:  No results for input(s): Rhona Ill in the last 72 hours. PT/INR: No results for input(s): INR, APTT in the last 72 hours. BNP: No results for input(s): BNP in the last 72 hours.   ESR: No results found for: SEDRATE  CRP: No results found for: CRP  D Dimer:   Lab Results   Component Value Date    DDIMER 954 07/31/2022      Folate and B12: No results found for: [id number], No results found for: FOLATE  Lactic Acid:   Lab Results   Component Value Date    LACTA 1.3 2022     Thyroid Studies:   Lab Results   Component Value Date    TSH 0.436 2018       Oupatient labs:  Lab Results   Component Value Date    CHOL 157 2022    TRIG 129 2022    HDL 30 2022    LDLCALC 101 (H) 2022    TSH 0.436 2018    INR 1.0 2016    LABA1C 8.4 (H) 2022       Urinalysis:    Lab Results   Component Value Date/Time    NITRU Negative 2022 08:25 PM    WBCUA 0-1 2022 08:25 PM    BACTERIA RARE 2022 08:25 PM    RBCUA 0-1 2022 08:25 PM    BLOODU TRACE-INTACT 2022 08:25 PM    SPECGRAV 1.010 2022 08:25 PM    GLUCOSEU >=1000 2022 08:25 PM       Imaging:  XR CHEST (2 VW)    Result Date: 2022  EXAMINATION: TWO XRAY VIEWS OF THE CHEST 2022 8:15 pm COMPARISON: 2018 HISTORY: ORDERING SYSTEM PROVIDED HISTORY: SOB, recent COVID last week TECHNOLOGIST PROVIDED HISTORY: Reason for exam:->SOB, recent COVID last week What reading provider will be dictating this exam?->CRC FINDINGS: PA and left lateral views of the chest demonstrate satisfactory expansion lungs which are clear. The cardiac silhouette appears unremarkable. There is no evidence of a pneumothorax. The soft tissues and the osseous structures appear unremarkable. No acute cardiopulmonary process.      NM LUNG SCAN PERFUSION ONLY    Result Date: 2022  Patient MRN: [id number] : 1985 Age:  39 years Gender: Male Order Date: 2022 10:27 PM Exam: NM LUNG SCAN PERFUSION ONLY Number of Images: 5 views Indication:   Shortness of breath, recent COVID, stage IV kidney disease Shortness of breath, recent COVID, stage IV kidney disease Decision Support Exception - unselect if not a suspected or confirmed emergency medical condition->Emergency Medical Condition (MA) What reading provider will be dictating this exam?->[facility] Comparison: Chest x-ray 2022 Findings: Ventilation images  were not performed The patient received 6 millicuries of technetium MAA. Perfusion images reveal no segmental perfusion defects. Low probability for pulmonary embolism       ASSESSMENT:  -Diabetic ketoacidosis  -History of type 1 diabetes  -Hypertension  -Hyperlipidemia  -Hyperkalemia      PLAN:  -Admit to the ICU  -Consult critical care  -Insulin infusion per DKA protocol  -IV fluids per DKA protocol  -Monitor serum electrolytes  -Telemetry        Diet: Diet NPO  Code Status: Prior  Surrogate decision maker confirmed with patient:   Extended Emergency Contact Information  Primary Emergency Contact: Stephanie Champion   [address] Phone: [phone]  Mobile Phone: [phone]  Relation: Other    DVT Prophylaxis: []Lovenox []Heparin []PCD [] [address] []Encouraged ambulation  Disposition: []Med/Surg [] Intermediate [] ICU/CCU  Admit status: [] Observation [] Inpatient     +++++++++++++++++++++++++++++++++++++++++++++++++  Long Anaya DO  +++++++++++++++++++++++++++++++++++++++++++++++++  NOTE: This report was transcribed using voice recognition software. Every effort was made to ensure accuracy; however, inadvertent computerized transcription errors may be present.

## 2022-08-02 VITALS
HEART RATE: 65 BPM | TEMPERATURE: 95.9 F | BODY MASS INDEX: 27.12 KG/M2 | RESPIRATION RATE: 18 BRPM | DIASTOLIC BLOOD PRESSURE: 93 MMHG | OXYGEN SATURATION: 98 % | WEIGHT: 189 LBS | SYSTOLIC BLOOD PRESSURE: 149 MMHG

## 2022-08-02 PROBLEM — E10.10 DIABETIC KETOACIDOSIS WITHOUT COMA ASSOCIATED WITH TYPE 1 DIABETES MELLITUS (HCC): Status: RESOLVED | Noted: 2022-07-31 | Resolved: 2022-08-02

## 2022-08-02 LAB
ANION GAP SERPL CALCULATED.3IONS-SCNC: 10 MMOL/L (ref 7–16)
ANION GAP SERPL CALCULATED.3IONS-SCNC: 8 MMOL/L (ref 7–16)
BASOPHILS ABSOLUTE: 0.01 E9/L (ref 0–0.2)
BASOPHILS RELATIVE PERCENT: 0.2 % (ref 0–2)
BUN BLDV-MCNC: 30 MG/DL (ref 6–20)
BUN BLDV-MCNC: 35 MG/DL (ref 6–20)
CALCIUM SERPL-MCNC: 8.9 MG/DL (ref 8.6–10.2)
CALCIUM SERPL-MCNC: 9.4 MG/DL (ref 8.6–10.2)
CHLORIDE BLD-SCNC: 103 MMOL/L (ref 98–107)
CHLORIDE BLD-SCNC: 104 MMOL/L (ref 98–107)
CO2: 23 MMOL/L (ref 22–29)
CO2: 23 MMOL/L (ref 22–29)
CREAT SERPL-MCNC: 2.2 MG/DL (ref 0.7–1.2)
CREAT SERPL-MCNC: 2.2 MG/DL (ref 0.7–1.2)
EOSINOPHILS ABSOLUTE: 0.07 E9/L (ref 0.05–0.5)
EOSINOPHILS RELATIVE PERCENT: 1.5 % (ref 0–6)
FOLATE: 13 NG/ML (ref 4.8–24.2)
GFR AFRICAN AMERICAN: 41
GFR AFRICAN AMERICAN: 41
GFR NON-AFRICAN AMERICAN: 34 ML/MIN/1.73
GFR NON-AFRICAN AMERICAN: 34 ML/MIN/1.73
GLUCOSE BLD-MCNC: 195 MG/DL (ref 74–99)
GLUCOSE BLD-MCNC: 273 MG/DL (ref 74–99)
HCT VFR BLD CALC: 37 % (ref 37–54)
HEMOGLOBIN: 12.7 G/DL (ref 12.5–16.5)
IMMATURE GRANULOCYTES #: 0.01 E9/L
IMMATURE GRANULOCYTES %: 0.2 % (ref 0–5)
IRON SATURATION: 47 % (ref 20–55)
IRON: 94 MCG/DL (ref 59–158)
LYMPHOCYTES ABSOLUTE: 1.43 E9/L (ref 1.5–4)
LYMPHOCYTES RELATIVE PERCENT: 31.2 % (ref 20–42)
MAGNESIUM: 2 MG/DL (ref 1.6–2.6)
MAGNESIUM: 2.1 MG/DL (ref 1.6–2.6)
MCH RBC QN AUTO: 31.6 PG (ref 26–35)
MCHC RBC AUTO-ENTMCNC: 34.3 % (ref 32–34.5)
MCV RBC AUTO: 92 FL (ref 80–99.9)
METER GLUCOSE: 176 MG/DL (ref 74–99)
METER GLUCOSE: 211 MG/DL (ref 74–99)
METER GLUCOSE: 239 MG/DL (ref 74–99)
METER GLUCOSE: 302 MG/DL (ref 74–99)
MONOCYTES ABSOLUTE: 0.45 E9/L (ref 0.1–0.95)
MONOCYTES RELATIVE PERCENT: 9.8 % (ref 2–12)
NEUTROPHILS ABSOLUTE: 2.62 E9/L (ref 1.8–7.3)
NEUTROPHILS RELATIVE PERCENT: 57.1 % (ref 43–80)
PARATHYROID HORMONE INTACT: 103 PG/ML (ref 15–65)
PDW BLD-RTO: 11.9 FL (ref 11.5–15)
PHOSPHORUS: 1.8 MG/DL (ref 2.5–4.5)
PHOSPHORUS: 2.3 MG/DL (ref 2.5–4.5)
PLATELET # BLD: 119 E9/L (ref 130–450)
PMV BLD AUTO: 12.3 FL (ref 7–12)
POTASSIUM SERPL-SCNC: 4.2 MMOL/L (ref 3.5–5)
POTASSIUM SERPL-SCNC: 4.5 MMOL/L (ref 3.5–5)
RBC # BLD: 4.02 E12/L (ref 3.8–5.8)
SODIUM BLD-SCNC: 134 MMOL/L (ref 132–146)
SODIUM BLD-SCNC: 137 MMOL/L (ref 132–146)
TOTAL IRON BINDING CAPACITY: 198 MCG/DL (ref 250–450)
URIC ACID, SERUM: 8.4 MG/DL (ref 3.4–7)
VITAMIN B-12: 1549 PG/ML (ref 211–946)
VITAMIN D 25-HYDROXY: 40 NG/ML (ref 30–100)
WBC # BLD: 4.6 E9/L (ref 4.5–11.5)

## 2022-08-02 PROCEDURE — 83540 ASSAY OF IRON: CPT

## 2022-08-02 PROCEDURE — G0378 HOSPITAL OBSERVATION PER HR: HCPCS

## 2022-08-02 PROCEDURE — 80048 BASIC METABOLIC PNL TOTAL CA: CPT

## 2022-08-02 PROCEDURE — 83735 ASSAY OF MAGNESIUM: CPT

## 2022-08-02 PROCEDURE — 82962 GLUCOSE BLOOD TEST: CPT

## 2022-08-02 PROCEDURE — 6360000002 HC RX W HCPCS

## 2022-08-02 PROCEDURE — 99231 SBSQ HOSP IP/OBS SF/LOW 25: CPT | Performed by: INTERNAL MEDICINE

## 2022-08-02 PROCEDURE — 36415 COLL VENOUS BLD VENIPUNCTURE: CPT

## 2022-08-02 PROCEDURE — 84100 ASSAY OF PHOSPHORUS: CPT

## 2022-08-02 PROCEDURE — 96372 THER/PROPH/DIAG INJ SC/IM: CPT

## 2022-08-02 PROCEDURE — 84550 ASSAY OF BLOOD/URIC ACID: CPT

## 2022-08-02 PROCEDURE — 6370000000 HC RX 637 (ALT 250 FOR IP)

## 2022-08-02 PROCEDURE — 82607 VITAMIN B-12: CPT

## 2022-08-02 PROCEDURE — 85025 COMPLETE CBC W/AUTO DIFF WBC: CPT

## 2022-08-02 PROCEDURE — 83550 IRON BINDING TEST: CPT

## 2022-08-02 PROCEDURE — 82746 ASSAY OF FOLIC ACID SERUM: CPT

## 2022-08-02 PROCEDURE — 82306 VITAMIN D 25 HYDROXY: CPT

## 2022-08-02 PROCEDURE — 83970 ASSAY OF PARATHORMONE: CPT

## 2022-08-02 PROCEDURE — 2580000003 HC RX 258

## 2022-08-02 RX ORDER — INSULIN GLARGINE 100 [IU]/ML
30 INJECTION, SOLUTION SUBCUTANEOUS NIGHTLY
Qty: 5 PEN | Refills: 3 | Status: SHIPPED | OUTPATIENT
Start: 2022-08-02

## 2022-08-02 RX ORDER — BLOOD SUGAR DIAGNOSTIC
1 STRIP MISCELLANEOUS DAILY
Qty: 50 EACH | Refills: 11 | Status: SHIPPED | OUTPATIENT
Start: 2022-08-02 | End: 2022-08-09 | Stop reason: SDUPTHER

## 2022-08-02 RX ORDER — METOPROLOL SUCCINATE 200 MG/1
TABLET, EXTENDED RELEASE ORAL
Qty: 30 TABLET | Refills: 3 | Status: SHIPPED | OUTPATIENT
Start: 2022-08-02

## 2022-08-02 RX ORDER — ASPIRIN 81 MG/1
TABLET ORAL
Qty: 30 TABLET | Refills: 3 | Status: SHIPPED | OUTPATIENT
Start: 2022-08-02

## 2022-08-02 RX ORDER — SODIUM BICARBONATE 650 MG/1
650 TABLET ORAL 3 TIMES DAILY
Qty: 90 TABLET | Refills: 1 | Status: SHIPPED | OUTPATIENT
Start: 2022-08-02

## 2022-08-02 RX ORDER — POTASSIUM CHLORIDE 1500 MG/1
TABLET, EXTENDED RELEASE ORAL
Qty: 60 TABLET | Refills: 3 | Status: SHIPPED | OUTPATIENT
Start: 2022-08-02

## 2022-08-02 RX ORDER — AMLODIPINE BESYLATE 10 MG/1
10 TABLET ORAL DAILY
Qty: 30 TABLET | Refills: 3 | Status: SHIPPED | OUTPATIENT
Start: 2022-08-03

## 2022-08-02 RX ORDER — BUMETANIDE 1 MG/1
TABLET ORAL
Qty: 30 TABLET | Refills: 5 | Status: SHIPPED | OUTPATIENT
Start: 2022-08-02

## 2022-08-02 RX ORDER — LOSARTAN POTASSIUM 100 MG/1
TABLET ORAL
Qty: 30 TABLET | Refills: 0 | Status: SHIPPED | OUTPATIENT
Start: 2022-08-02 | End: 2022-08-09

## 2022-08-02 RX ORDER — INSULIN LISPRO 100 [IU]/ML
INJECTION, SOLUTION INTRAVENOUS; SUBCUTANEOUS
Qty: 2 PEN | Refills: 3 | Status: SHIPPED | OUTPATIENT
Start: 2022-08-02

## 2022-08-02 RX ORDER — ATORVASTATIN CALCIUM 20 MG/1
20 TABLET, FILM COATED ORAL DAILY
Qty: 90 TABLET | Refills: 2 | Status: SHIPPED | OUTPATIENT
Start: 2022-08-02

## 2022-08-02 RX ADMIN — INSULIN LISPRO 6 UNITS: 100 INJECTION, SOLUTION INTRAVENOUS; SUBCUTANEOUS at 11:41

## 2022-08-02 RX ADMIN — ENOXAPARIN SODIUM 40 MG: 100 INJECTION SUBCUTANEOUS at 08:57

## 2022-08-02 RX ADMIN — ATORVASTATIN CALCIUM 20 MG: 20 TABLET, FILM COATED ORAL at 00:08

## 2022-08-02 RX ADMIN — INSULIN LISPRO 4 UNITS: 100 INJECTION, SOLUTION INTRAVENOUS; SUBCUTANEOUS at 00:09

## 2022-08-02 RX ADMIN — SODIUM CHLORIDE, PRESERVATIVE FREE 10 ML: 5 INJECTION INTRAVENOUS at 08:57

## 2022-08-02 RX ADMIN — AMLODIPINE BESYLATE 10 MG: 10 TABLET ORAL at 08:57

## 2022-08-02 NOTE — PLAN OF CARE
Problem: Chronic Conditions and Co-morbidities  Goal: Patient's chronic conditions and co-morbidity symptoms are monitored and maintained or improved  Outcome: Progressing  Flowsheets (Taken 8/1/2022 2000)  Care Plan - Patient's Chronic Conditions and Co-Morbidity Symptoms are Monitored and Maintained or Improved: Monitor and assess patient's chronic conditions and comorbid symptoms for stability, deterioration, or improvement     Problem: Discharge Planning  Goal: Discharge to home or other facility with appropriate resources  Outcome: Progressing  Flowsheets (Taken 8/1/2022 2000)  Discharge to home or other facility with appropriate resources: Identify barriers to discharge with patient and caregiver     Problem: ABCDS Injury Assessment  Goal: Absence of physical injury  Outcome: Progressing

## 2022-08-02 NOTE — DISCHARGE SUMMARY
[address]  Department of Internal Medicine   Internal Medicine Residency Program   Discharge Summary    PCP:  Dr. Jackie Her Team: 1    Admit Date:  7/31/2022  Discharge Date:  08/02/22    Admission Diagnosis:  Diabetic ketoacidosis without coma associated with type 1 diabetes mellitus [facility])    Discharge Diagnosis:  Diabetic ketoacidosis without coma associated with type 1 diabetes mellitus [facility])      Hospital Course:  Mr. Isaac Mccormack is a 41-year-old male with PMH of type 1 diabetes, HTN, and CKD who was admitted on 7/31/2022 for DKA. He presented to the ED with 5-7 days post-COVID symptoms including fatigue, nausea, vomiting, generalized abdominal pain, shortness of breath, and dizziness. His workup was notable for glucose 552 with high anion gap (19), UA positive for glucose and ketones, and beta-hydroxybutyrate 1.78. He additionally was found to have acute renal failure with BUN 74 and creatinine 3.7. He was started on IV fluids and insulin infusion and admitted to the ICU. His blood glucose improved and anion gap normalized quickly. He was bridged to subcutaneous insulin and transferred out of the ICU. On day of discharge, Mr. Isaac Mccormack is asymptomatic and functioning at his baseline. His glucose has been controlled with basal-bolus insulin with moderate-dose correcting SSI, and his kidney function has returned to baseline.       Significant findings (history and exam, laboratory, radiological, pathology, other tests):  CBC with Differential:    Lab Results   Component Value Date/Time    WBC 4.6 08/02/2022 03:46 AM    RBC 4.02 08/02/2022 03:46 AM    HGB 12.7 08/02/2022 03:46 AM    HCT 37.0 08/02/2022 03:46 AM     08/02/2022 03:46 AM    MCV 92.0 08/02/2022 03:46 AM    MCH 31.6 08/02/2022 03:46 AM    MCHC 34.3 08/02/2022 03:46 AM    RDW 11.9 08/02/2022 03:46 AM    LYMPHOPCT 31.2 08/02/2022 03:46 AM    MONOPCT 9.8 08/02/2022 03:46 AM    BASOPCT 0.2 08/02/2022 Suppl (ONE TOUCH ULTRA 2) w/Device KIT Use to check BG 4x each day  Qty: 1 kit, Refills: 0    Associated Diagnoses: Type 2 diabetes mellitus without complication, with long-term current use of insulin ([facility])      glimepiride (AMARYL) 4 MG tablet TAKE 1 TABLET BY MOUTH EVERY DAY BEFORE BREAKFAST  Qty: 30 tablet, Refills: 5    Associated Diagnoses: Type 2 diabetes mellitus without complications ([facility])      glucose monitoring kit (FREESTYLE) monitoring kit Use once.   Qty: 1 kit, Refills: 0    Associated Diagnoses: Type 1 diabetes mellitus with ketoacidotic coma ([facility])      insulin glargine (LANTUS SOLOSTAR) 100 UNIT/ML injection pen Inject 30 Units into the skin nightly  Qty: 5 pen, Refills: 3    Associated Diagnoses: Type 1 diabetes mellitus with ketoacidotic coma ([facility])      insulin lispro, 1 Unit Dial, (HUMALOG KWIKPEN) 100 UNIT/ML SOPN Take 5 units if BG >300 mg/dl, do not take more freq than every 4 hours, max dose of 30 units/day  Qty: 2 pen, Refills: 3    Associated Diagnoses: Type 1 diabetes mellitus with ketoacidotic coma ([facility])      glucagon 1 MG injection  to inject 1 mg into the muscle or under the skin if you become unconscious for any reason other than suspected high BG  Qty: 1 kit, Refills: 3    Associated Diagnoses: Insulin dependent diabetes mellitus      ASPIRIN LOW DOSE 81 MG EC tablet TAKE 1 TABLET BY MOUTH EVERY DAY  Qty: 30 tablet, Refills: 3    Associated Diagnoses: Essential hypertension      metoprolol succinate (TOPROL XL) 200 MG extended release tablet TAKE 1 TABLET BY MOUTH EVERY DAY  Qty: 30 tablet, Refills: 3    Associated Diagnoses: Essential hypertension      atorvastatin (LIPITOR) 20 MG tablet Take 1 tablet by mouth daily  Qty: 90 tablet, Refills: 3    Associated Diagnoses: Hyperlipidemia, unspecified hyperlipidemia type      Insulin Syringe-Needle U-100 30G X 1/2\" 1 ML MISC 1 each by Does not apply route daily Use as directed  Qty: 100 each, Refills: 11    Associated Diagnoses: Insulin dependent diabetes mellitus      Insulin Pen Needle 32G X 4 MM MISC Use to take long acting insulin once each day  Qty: 100 each, Refills: 5    Associated Diagnoses: Insulin dependent diabetes mellitus      glucose ([facility] GLUCOSE) 4 g chewable tablet Take 4 tablets by mouth as needed for Low blood sugar (<70 mg/dl) every 15 minutes until BG rises to >70mg/dl  Qty: 60 tablet, Refills: 3    Associated Diagnoses: Insulin dependent diabetes mellitus      amLODIPine (NORVASC) 10 MG tablet TAKE 1 TABLET BY MOUTH EVERY DAY  Qty: 30 tablet, Refills: 0    Associated Diagnoses: Essential hypertension      bumetanide (BUMEX) 1 MG tablet TAKE 1 TABLET BY MOUTH TWICE A DAY  Refills: 5      FLUoxetine (PROZAC) 20 MG capsule TAKE 1 CAPSULE BY MOUTH EVERY DAY IN THE MORNING  Refills: 2      KLOR-CON M20 20 MEQ extended release tablet TAKE 1 TABLET BY MOUTH EVERY OTHER DAY  Refills: 3      losartan (COZAAR) 100 MG tablet TAKE 1 TABLET BY MOUTH EVERY DAY  Qty: 30 tablet, Refills: 0    Associated Diagnoses: Essential hypertension      minoxidil (LONITEN) 10 MG tablet TAKE 1 TABLET BY MOUTH THREE TIMES A DAY  Qty: 30 tablet, Refills: 0    Associated Diagnoses: Essential hypertension      !! FREESTYLE LANCETS MISC 1 each by Does not apply route daily  Qty: 400 each, Refills: 3    Associated Diagnoses: Insulin dependent diabetes mellitus      !!  Lancets MISC 1 each by Does not apply route 4 times daily  Qty: 600 each, Refills: 1      LATUDA 20 MG TABS tablet TAKE 1 TABLET BY MOUTH EVERY DAY EVERY AFTERNOON  Refills: 2      traZODone (DESYREL) 50 MG tablet TAKE 1/2 TABLET BY MOUTH AT BEDTIME  Refills: 2      ergocalciferol (DRISDOL) 14670 units capsule Take with meal 2x/week  Qty: 8 capsule, Refills: 3      amphetamine-dextroamphetamine (ADDERALL) 5 MG tablet TAKE 1 TABLET BY MOUTH TWICE A DAY  Refills: 0      sodium bicarbonate 650 MG tablet Take 1 tablet by mouth 3 times daily  Qty: 90 tablet, Refills: 1      Blood Pressure Monitoring (ADULT BLOOD PRESSURE CUFF LG) KIT 1 kit by Does not apply route daily  Qty: 1 kit, Refills: 0       !! - Potential duplicate medications found. Please discuss with provider. Discharge Instructions    Follow ups  Please follow up with your primary care physician via virtual visit at 1:00 PM on 8/9/22. Please contact the internal medicine clinic if you need to change this appointment time. Please keep all other follow up appointments:  Endocrinology - Strongly recommend close follow up to ensure your insulin regimen is at an appropriate dose to control your blood sugar. Changes in healthcare   Please take all medications as indicated  Diet: diabetic diet   Activity: activity as tolerated  New Medications started during this hospital stay  none  Changes to your medications  none  Medications you should stop taking   none  Additional labs, testing or imaging needed after discharge   Continue to check your blood sugar at home before meals. Please contact us if you have any concerns, wish to change or make an appointment:  Internal medicine clinic   Phone: [phone]  Fax: [phone]  [facility] [address]  Or please call the nurse line at [phone]. Should you have further questions in regards to this visit, you can review your clinical note and after visit summary document on your [employer] account. Other questions can be directed to our nurse line at [phone]. Other than any new prescriptions given to you today, the list of home medications on this After Visit Summary are based on information provided to us from you and your healthcare providers. This information, including the list, dose, and frequency of medications is only as accurate as the information you provided. If you have any questions or concerns about your home medications, please contact your Primary Care Physician for further clarification.         Aileen Munoz DO, PGY - 1  08/02/22 11:38 AM      Attending physician: Dr. Compa Tafoya

## 2022-08-02 NOTE — PROGRESS NOTES
[address] [facility]  Internal Medicine Residency / [address]    Attending Physician Statement  I have discussed the case, including pertinent history and exam findings with the resident and the team.  I have seen and examined the patient and the key elements of the encounter have been performed by me. I agree with the assessment, plan and orders as documented by the resident. DKA resolved ,   back to long acting insulin with Humalog coverage   Acute COVID probably caused acute DKA  Anion gap closed  VS stable   A&O  May consider discharge home  Remainder of medical problems as per resident note.       Phoebe Jackson MD [facility]  Internal Medicine Residency Faculty

## 2022-08-02 NOTE — PROGRESS NOTES
Discharge papers given to patient and girlfriend.  All questions answered to patient satisfaction, telemetry monitor and IV access removed     Ronald Barcenas RN   4:45 PM

## 2022-08-02 NOTE — PROGRESS NOTES
Messaged MELECIO to notify of patients BG and to notify patient was inquiring about discharge     Brian Weaver RN   11:59 AM

## 2022-08-02 NOTE — PROGRESS NOTES
Physician Progress Note      PATIENT:               Amanda Marie  CSN #:                  [id number]  :                       1985  ADMIT DATE:       2022 8:06 PM  [address] [place] DATE:  RESPONDING  PROVIDER #:        Ean Washburn MD          QUERY TEXT:    Pt admitted with DKA. Pt noted to have COVID-19. If possible, please document   in progress notes and discharge summary the relationship, if any, between DKA   and COVID-19. The medical record reflects the following:  Risk Factors: DKA, non compliance  Clinical Indicators:  Patient recent diagnosis of COVID now presenting with   DKA. 22 ICU notes \"Diabetic ketoacidosis 2/2 medication noncompliance and   viral infection\". COVID positive. Treatment: DKA protocol, labs, ICU    Thank you,  Leonarda Watts RN, BSN, CCDS, Clinical Documentation Improvement  Options provided:  -- DKA due to COVID-19  -- DKA unrelated to COVID-19  -- Other - I will add my own diagnosis  -- Disagree - Not applicable / Not valid  -- Disagree - Clinically unable to determine / Unknown  -- Refer to Clinical Documentation Reviewer    PROVIDER RESPONSE TEXT:    This patient has DKA due to COVID-19.     Query created by: Abdoul Rene on 2022 12:18 PM      Electronically signed by:  Ean Washburn MD 2022 12:23 PM

## 2022-08-02 NOTE — PLAN OF CARE
Problem: Chronic Conditions and Co-morbidities  Goal: Patient's chronic conditions and co-morbidity symptoms are monitored and maintained or improved  8/2/2022 1647 by Osiris Willett RN  Outcome: Completed  8/2/2022 0424 by Pearl Goldstein RN  Outcome: Progressing  Flowsheets (Taken 8/1/2022 2000)  Care Plan - Patient's Chronic Conditions and Co-Morbidity Symptoms are Monitored and Maintained or Improved: Monitor and assess patient's chronic conditions and comorbid symptoms for stability, deterioration, or improvement     Problem: Discharge Planning  Goal: Discharge to home or other facility with appropriate resources  8/2/2022 1647 by Osiris Willett RN  Outcome: Completed  8/2/2022 0424 by Pearl Goldstein RN  Outcome: Progressing  Flowsheets (Taken 8/1/2022 2000)  Discharge to home or other facility with appropriate resources: Identify barriers to discharge with patient and caregiver     Problem: ABCDS Injury Assessment  Goal: Absence of physical injury  8/2/2022 1647 by Osiris Willett RN  Outcome: Completed  8/2/2022 0424 by Pearl Goldstein RN  Outcome: Progressing

## 2022-08-02 NOTE — CARE COORDINATION
8/2/22 Spoke with patient regarding transition of care. [facility] He lives at home with his son and his son's mother in a one story home. Patient was independent prior to admit and discharge goal is to return home with no needs. Patient states he does not have a PCP and does not need one. Pharmacy is [facility] in [place]. [facility] will provide transport upon discharge.     Electronically signed by NATE Wright on 8/2/2022 at 2:10 PM

## 2022-08-06 LAB
BLOOD CULTURE, ROUTINE: NORMAL
CULTURE, BLOOD 2: NORMAL

## 2022-08-08 PROBLEM — E10.10 DKA, TYPE 1, NOT AT GOAL (HCC): Status: RESOLVED | Noted: 2022-08-01 | Resolved: 2022-08-08

## 2022-08-08 NOTE — PROGRESS NOTES
Katrin Cooper 6  Internal Medicine Residency Program  [facility] Note      SUBJECTIVE:  CC: had concerns including Follow-Up from Hospital and Diabetes. HPI:Ashkan Hines (:  1985) is a 39 y.o. male here for a routine evaluation of the following: Follow-Up from Hospital and Diabetes     Patient is a pleasant 39 y.o. male who  has a past medical history of Chest pain, HTN (hypertension), Hydrocele, left, and Type 1 diabetes mellitus with diabetic chronic kidney disease ([address] He presents to the clinic to address following concerns:    Acute concerns this visit:   Pt state that he he is feeling better but has dizziness. States he takes his medicine as prescribed. Denies any issues  Denies     Assessment   1. Primary hypertension  Assessment & Plan:   Not at goal    On amlodipine 10 mg daily, losartan 100 mg daily and Toprol-XL    2. Hypertriglyceridemia  Assessment & Plan:   noted on 22  On Atorvastatin 20 mg    3. Type 2 diabetes mellitus without complications (HCC)  -     Continuous Blood Gluc Sensor (FREESTYLE ZAINAB 14 DAY SENSOR) MISC; Please change sensor every 14 days, Disp-3 each, R-2Normal  4. Hospital discharge follow-up  -     UT DISCHARGE MEDS RECONCILED W/ CURRENT OUTPATIENT MED LIST  5. Type 2 diabetes mellitus without complication, with long-term current use of insulin (HCC)  -     blood glucose test strips (ONETOUCH ULTRA) strip; 1 each by In Vitro route daily As needed. , Disp-50 each, R-11Normal  -     Lancets (Larri Estimable) [address] [facility]; To test daily, Disp-100 each, R-3Normal  6.  CKD stage 3 due to type 2 diabetes mellitus ([address]  Assessment & Plan:  A1c 14.8% as of 2022  Baseline CR 2.3-2.9   GFR 41  On bicarb and Klor-Con tablets      Takes bumex BID     Plan   Zainab prescribed for BS monitor   continue insulin glargine 30 units nightly  Will follow with endocrinology for insulin dosing   Strongly advised to keep BS log to bring to clinic  Encouraged to keep BS log to be brought to the clinic on Monday 8/15/22. Patients girlfriend  states she will ensure pt keeps BS log for next visit. ASCVD The ASCVD Risk score (Eliseo Luo., et al., 2013) failed to calculate for the following reasons: The 2013 ASCVD risk score is only valid for ages 36 to 78  ______________________________________________________________________________  Patient was last seen in the clinic on ***/***/***      Lab Results   Component Value Date/Time    LABA1C 14.8 08/01/2022 09:30 AM       PMH according to chart:      Diagnosis Date    Chest pain     HTN (hypertension)     Hydrocele, left 10/28/2010    Type 1 diabetes mellitus with diabetic chronic kidney disease ([facility]        Review of Systems    Outpatient Medications Marked as Taking for the 8/9/22 encounter (Telemedicine) with Shane Gatica MD   Medication Sig Dispense Refill    valsartan (DIOVAN) 320 MG tablet Take 320 mg by mouth in the morning. vitamin D (CHOLECALCIFEROL) 125 MCG (5000 UT) CAPS capsule Take 5,000 Units by mouth in the morning. blood glucose test strips (ONETOUCH ULTRA) strip 1 each by In Vitro route daily As needed. 50 each 11    Lancets (ONETOUCH DELICA PLUS [id number]) MISC To test daily 100 each 3    Continuous Blood Gluc Sensor (FREESTYLE ZAINAB 14 DAY SENSOR) MISC Please change sensor every 14 days 3 each 2    amLODIPine (NORVASC) 10 MG tablet Take 1 tablet by mouth in the morning. 30 tablet 3    insulin glargine (LANTUS SOLOSTAR) 100 UNIT/ML injection pen Inject 30 Units into the skin nightly 5 pen 3    insulin lispro, 1 Unit Dial, (HUMALOG KWIKPEN) 100 UNIT/ML SOPN Take 5 units if BG >300 mg/dl, do not take more freq than every 4 hours, max dose of 30 units/day 2 pen 3    sodium bicarbonate 650 MG tablet Take 1 tablet by mouth in the morning and 1 tablet at noon and 1 tablet before bedtime.  90 tablet 1    KLOR-CON M20 20 MEQ extended release tablet TAKE 1 TABLET BY MOUTH EVERY OTHER DAY 60 tablet 3    aspirin (ASPIRIN LOW DOSE) 81 MG EC tablet TAKE 1 TABLET BY MOUTH EVERY DAY 30 tablet 3    atorvastatin (LIPITOR) 20 MG tablet Take 1 tablet by mouth in the morning. 90 tablet 2    bumetanide (BUMEX) 1 MG tablet TAKE 1 TABLET BY MOUTH TWICE A DAY 30 tablet 5    metoprolol succinate (TOPROL XL) 200 MG extended release tablet TAKE 1 TABLET BY MOUTH EVERY DAY 30 tablet 3    glimepiride (AMARYL) 4 MG tablet TAKE 1 TABLET BY MOUTH EVERY DAY BEFORE BREAKFAST 30 tablet 5    FLUoxetine (PROZAC) 20 MG capsule TAKE 1 CAPSULE BY MOUTH EVERY DAY IN THE MORNING  2    minoxidil (LONITEN) 10 MG tablet TAKE 1 TABLET BY MOUTH THREE TIMES A DAY 30 tablet 0    LATUDA 20 MG TABS tablet TAKE 1 TABLET BY MOUTH EVERY DAY EVERY AFTERNOON  2    traZODone (DESYREL) 50 MG tablet TAKE 1/2 TABLET BY MOUTH AT BEDTIME  2    amphetamine-dextroamphetamine (ADDERALL) 5 MG tablet TAKE 1 TABLET BY MOUTH TWICE A DAY  0       Social History     Tobacco Use    Smoking status: Every Day     Packs/day: 1.00     Years: 8.00     Pack years: 8.00     Types: Cigars, Cigarettes    Smokeless tobacco: Never    Tobacco comments:     pack aday   Vaping Use    Vaping Use: Never used   Substance Use Topics    Alcohol use: Yes     Comment: occasional    Drug use: No     Types: Marijuana Delona Members)     Comment: QUIT MARIJUANA       Not in a hospital admission. I have reviewed all pertinent PMHx, PSHx, FamHx, SocialHx, medications, and allergies and updated history as appropriate. OBJECTIVE:    VS: There were no vitals taken for this visit. Physical Exam    No results found.       Chronic issues:         HCM    To be discussed with PCP    Health Maintenance Due   Topic Date Due    Varicella vaccine (1 of 2 - 2-dose childhood series) Never done    Depression Screen  Never done    Hepatitis B vaccine (1 of 3 - Risk 3-dose series) Never done    Pneumococcal 0-64 years Vaccine (2 - PCV) 03/13/2018    Diabetic retinal exam  11/05/2019    Diabetic foot exam 11/09/2019    COVID-19 Vaccine (2 - Pfizer series) 01/18/2022       RTC   With PCP for chronic problems       Future Appointments   Date Time Provider Lucio Martinezi   8/12/2022 10:00 AM Fiorella Shine MD [facility]   8/15/2022  3:00 PM Neville Villar MD [facility]       I have reviewed my findings and recommendations with Ricardo Romero and Dr Javier Ochoa. Electronically signed by Darci Gomez MD, PhD PGY- 2 on 8/9/2022.     Internal Medicine Resident

## 2022-08-09 ENCOUNTER — TELEMEDICINE (OUTPATIENT)
Dept: INTERNAL MEDICINE | Age: 37
End: 2022-08-09
Payer: COMMERCIAL

## 2022-08-09 DIAGNOSIS — E11.9 TYPE 2 DIABETES MELLITUS WITHOUT COMPLICATION, WITH LONG-TERM CURRENT USE OF INSULIN (HCC): ICD-10-CM

## 2022-08-09 DIAGNOSIS — E78.1 HYPERTRIGLYCERIDEMIA: ICD-10-CM

## 2022-08-09 DIAGNOSIS — Z79.4 TYPE 2 DIABETES MELLITUS WITHOUT COMPLICATION, WITH LONG-TERM CURRENT USE OF INSULIN (HCC): ICD-10-CM

## 2022-08-09 DIAGNOSIS — Z09 HOSPITAL DISCHARGE FOLLOW-UP: ICD-10-CM

## 2022-08-09 DIAGNOSIS — I10 PRIMARY HYPERTENSION: Primary | ICD-10-CM

## 2022-08-09 DIAGNOSIS — E11.22 CKD STAGE 3 DUE TO TYPE 2 DIABETES MELLITUS (HCC): ICD-10-CM

## 2022-08-09 DIAGNOSIS — N18.30 CKD STAGE 3 DUE TO TYPE 2 DIABETES MELLITUS (HCC): ICD-10-CM

## 2022-08-09 PROBLEM — E11.10 DIABETIC KETOACIDOSIS WITHOUT COMA ASSOCIATED WITH TYPE 2 DIABETES MELLITUS (HCC): Status: ACTIVE | Noted: 2022-07-31

## 2022-08-09 PROCEDURE — 1111F DSCHRG MED/CURRENT MED MERGE: CPT | Performed by: INTERNAL MEDICINE

## 2022-08-09 PROCEDURE — 99214 OFFICE O/P EST MOD 30 MIN: CPT | Performed by: INTERNAL MEDICINE

## 2022-08-09 RX ORDER — BLOOD SUGAR DIAGNOSTIC
1 STRIP MISCELLANEOUS DAILY
Qty: 50 EACH | Refills: 11 | Status: SHIPPED | OUTPATIENT
Start: 2022-08-09

## 2022-08-09 RX ORDER — FLASH GLUCOSE SENSOR
KIT MISCELLANEOUS
Qty: 3 EACH | Refills: 2 | Status: SHIPPED | OUTPATIENT
Start: 2022-08-09

## 2022-08-09 RX ORDER — LANCETS 33 GAUGE
EACH MISCELLANEOUS
Qty: 100 EACH | Refills: 3 | Status: SHIPPED | OUTPATIENT
Start: 2022-08-09

## 2022-08-09 RX ORDER — VALSARTAN 320 MG/1
320 TABLET ORAL DAILY
COMMUNITY

## 2022-08-09 ASSESSMENT — ENCOUNTER SYMPTOMS
ABDOMINAL PAIN: 0
RHINORRHEA: 0
COLOR CHANGE: 0
CONSTIPATION: 0
VOMITING: 0
BACK PAIN: 0
DIARRHEA: 0
COUGH: 0
SHORTNESS OF BREATH: 0

## 2022-08-09 NOTE — PATIENT INSTRUCTIONS
[facility] Internal Medicine Resident Service    Activity as tolerated  Diet: common adult, low sodium, and low protein  Be compliant with your medications and take them as prescribed. Special Instructions:     A new device has been prescribed called Gertrude Odom which is a transdermal glucose monitor. Please be sure to replace it every 14 days. An in-person appt has bene made at our clinic. Please show up as scheduled. Continue to take rest of medications as prescribed. No labs were ordered this clinic visit. Continue to exercise to maintain good health       Hospital Follow up: Other Follow-Ups:    Future Appointments   Date Time Provider Lucio Romero   8/12/2022 10:00 AM Lavonne Nageotte, MD [facility]   8/15/2022  3:00 PM Patrick Walker MD [facility]       If a referral was placed on your behalf during your visit, you should expect to receive information about the date and time of your referral appointment within 7-10 days. If not, please call the office at [phone] and ask to speak to the . Should you have further questions in regards to this visit, you can review your clinical note and after visit summary document on your My [facility] account. Other questions can be directed to our nurse line at [phone]. Discharge instruction reviewed with Patient. Patient verbalizes understanding. Script given.       Branden Ventura MD PGY-2  8/9/2022  4:34 PM

## 2022-08-09 NOTE — PROGRESS NOTES
Appropriations Act. Patient identification was verified, and a caregiver was present when appropriate. The patient was located at Home: [address] [place] [address]. Provider was located at [facility] (Appt Dept): [address]  [facility]  [address] [place] [address]         --Edel Jean MD     Case discussed with Dr Cyrus Hernandez

## 2022-08-12 ENCOUNTER — OFFICE VISIT (OUTPATIENT)
Dept: ENDOCRINOLOGY | Age: 37
End: 2022-08-12
Payer: COMMERCIAL

## 2022-08-12 VITALS — BODY MASS INDEX: 29.63 KG/M2 | HEIGHT: 70 IN | WEIGHT: 207 LBS | RESPIRATION RATE: 18 BRPM

## 2022-08-12 DIAGNOSIS — Z91.119 DIETARY NONCOMPLIANCE: ICD-10-CM

## 2022-08-12 DIAGNOSIS — E55.9 VITAMIN D DEFICIENCY: ICD-10-CM

## 2022-08-12 DIAGNOSIS — Z79.4 TYPE 2 DIABETES MELLITUS WITH HYPERGLYCEMIA, WITH LONG-TERM CURRENT USE OF INSULIN (HCC): Primary | ICD-10-CM

## 2022-08-12 DIAGNOSIS — E11.65 TYPE 2 DIABETES MELLITUS WITH HYPERGLYCEMIA, WITH LONG-TERM CURRENT USE OF INSULIN (HCC): Primary | ICD-10-CM

## 2022-08-12 PROCEDURE — 1111F DSCHRG MED/CURRENT MED MERGE: CPT | Performed by: INTERNAL MEDICINE

## 2022-08-12 PROCEDURE — 3046F HEMOGLOBIN A1C LEVEL >9.0%: CPT | Performed by: INTERNAL MEDICINE

## 2022-08-12 PROCEDURE — G8427 DOCREV CUR MEDS BY ELIG CLIN: HCPCS | Performed by: INTERNAL MEDICINE

## 2022-08-12 PROCEDURE — 2022F DILAT RTA XM EVC RTNOPTHY: CPT | Performed by: INTERNAL MEDICINE

## 2022-08-12 PROCEDURE — 99204 OFFICE O/P NEW MOD 45 MIN: CPT | Performed by: INTERNAL MEDICINE

## 2022-08-12 PROCEDURE — G8419 CALC BMI OUT NRM PARAM NOF/U: HCPCS | Performed by: INTERNAL MEDICINE

## 2022-08-12 PROCEDURE — 4004F PT TOBACCO SCREEN RCVD TLK: CPT | Performed by: INTERNAL MEDICINE

## 2022-08-12 NOTE — PROGRESS NOTES
[address] [place] [address] Department of Endocrinology Diabetes and Metabolism   [address] [facility] [address]   Phone: [phone]  Fax: [phone]    Date of Service: 8/12/2022  Primary Care Physician: Jaki Yin MD  Provider: Adrián Zhao MD    Reason for the visit:  DM type 2     History of Present Illness: The history is provided by the patient. No  was used. Accuracy of the patient data is excellent. Carlos Evans is a very pleasant 39 y.o. male seen today for diabetes management     Carlos Evans was diagnosed with diabetes at age   and currently on Glimepiride 4 mg daily, Lantus 30u nightly, Humalog 5u with meals   The patient has been checking blood sugar  2 times/day   Most recent A1c results summarized below  Lab Results   Component Value Date/Time    LABA1C 14.8 08/01/2022 09:30 AM    LABA1C 8.4 05/17/2022 10:10 AM    LABA1C 10.9 03/01/2021 01:36 PM     Patient has had no hypoglycemic episodes   The patient hasn't been mindful of what has been eating and wasn't following diabetes diet    I reviewed current medications and the patient has no issues with diabetes medications  Carlos Evans is up to date with eye exam and denied any history of diabetic retinopathy   The patient her performs his own feet care  Microvascular complications:  No Retinopathy, Nephropathy or Neuropathy   Macrovascular complications: no CAD, PVD, or Stroke    PAST MEDICAL HISTORY   Past Medical History:   Diagnosis Date    Chest pain     HTN (hypertension)     Hydrocele, left 10/28/2010       PAST SURGICAL HISTORY   Past Surgical History:   Procedure Laterality Date    HYDROCELE EXCISION  November 2010       SOCIAL HISTORY   Tobacco:   reports that he has been smoking cigars and cigarettes. He has a 8.00 pack-year smoking history. He has never used smokeless tobacco.  Alcohol:   reports current alcohol use. Drugs:   reports no history of drug use.     FAMILY HISTORY   Family History   Problem Relation Age of Onset    Diabetes Maternal Grandmother     Heart Failure Maternal Grandmother     Diabetes Mother     Hypertension Mother     Crohn's Disease Sister     Hypertension Sister        ALLERGIES AND DRUG REACTIONS   No Known Allergies    CURRENT MEDICATIONS   Current Outpatient Medications   Medication Sig Dispense Refill    valsartan (DIOVAN) 320 MG tablet Take 320 mg by mouth in the morning. vitamin D (CHOLECALCIFEROL) 125 MCG (5000 UT) CAPS capsule Take 5,000 Units by mouth in the morning. blood glucose test strips (ONETOUCH ULTRA) strip 1 each by In Vitro route daily As needed. 50 each 11    Lancets (ONETOUCH DELICA PLUS [id number]) MISC To test daily 100 each 3    Continuous Blood Gluc Sensor (FREESTYLE ZAINAB 14 DAY SENSOR) MISC Please change sensor every 14 days 3 each 2    amLODIPine (NORVASC) 10 MG tablet Take 1 tablet by mouth in the morning. 30 tablet 3    insulin glargine (LANTUS SOLOSTAR) 100 UNIT/ML injection pen Inject 30 Units into the skin nightly 5 pen 3    insulin lispro, 1 Unit Dial, (HUMALOG KWIKPEN) 100 UNIT/ML SOPN Take 5 units if BG >300 mg/dl, do not take more freq than every 4 hours, max dose of 30 units/day 2 pen 3    sodium bicarbonate 650 MG tablet Take 1 tablet by mouth in the morning and 1 tablet at noon and 1 tablet before bedtime. 90 tablet 1    KLOR-CON M20 20 MEQ extended release tablet TAKE 1 TABLET BY MOUTH EVERY OTHER DAY 60 tablet 3    aspirin (ASPIRIN LOW DOSE) 81 MG EC tablet TAKE 1 TABLET BY MOUTH EVERY DAY 30 tablet 3    atorvastatin (LIPITOR) 20 MG tablet Take 1 tablet by mouth in the morning.  90 tablet 2    bumetanide (BUMEX) 1 MG tablet TAKE 1 TABLET BY MOUTH TWICE A DAY 30 tablet 5    metoprolol succinate (TOPROL XL) 200 MG extended release tablet TAKE 1 TABLET BY MOUTH EVERY DAY 30 tablet 3    Blood Glucose Monitoring Suppl (ONE TOUCH ULTRA 2) w/Device KIT Use to check BG 4x each day 1 kit 0    glimepiride (AMARYL) 4 MG tablet TAKE 1 TABLET BY MOUTH EVERY DAY BEFORE BREAKFAST 30 tablet 5    glucose monitoring kit (FREESTYLE) monitoring kit Use once.  1 kit 0    glucagon 1 MG injection  to inject 1 mg into the muscle or under the skin if you become unconscious for any reason other than suspected high BG 1 kit 3    Insulin Syringe-Needle U-100 30G X 1/2\" 1 ML MISC 1 each by Does not apply route daily Use as directed 100 each 11    Insulin Pen Needle 32G X 4 MM MISC Use to take long acting insulin once each day 100 each 5    glucose ([facility] GLUCOSE) 4 g chewable tablet Take 4 tablets by mouth as needed for Low blood sugar (<70 mg/dl) every 15 minutes until BG rises to >70mg/dl 60 tablet 3    FLUoxetine (PROZAC) 20 MG capsule TAKE 1 CAPSULE BY MOUTH EVERY DAY IN THE MORNING  2    minoxidil (LONITEN) 10 MG tablet TAKE 1 TABLET BY MOUTH THREE TIMES A DAY 30 tablet 0    FREESTYLE LANCETS MISC 1 each by Does not apply route daily 400 each 3    Lancets MISC 1 each by Does not apply route 4 times daily 600 each 1    LATUDA 20 MG TABS tablet TAKE 1 TABLET BY MOUTH EVERY DAY EVERY AFTERNOON  2    traZODone (DESYREL) 50 MG tablet TAKE 1/2 TABLET BY MOUTH AT BEDTIME  2    ergocalciferol (DRISDOL) 74359 units capsule Take with meal 2x/week (Patient taking differently: once a week Take with meal 2x/week) 8 capsule 3    amphetamine-dextroamphetamine (ADDERALL) 5 MG tablet TAKE 1 TABLET BY MOUTH TWICE A DAY  0    Blood Pressure Monitoring (ADULT BLOOD PRESSURE CUFF LG) KIT 1 kit by Does not apply route daily 1 kit 0    glucagon 1 MG injection Infuse 1 mg intravenously once for 1 dose Administer 1 mg under skin if blood sugars go <70 with in ability to take glucose tablets 1 kit 3     Current Facility-Administered Medications   Medication Dose Route Frequency Provider Last Rate Last Admin    insulin regular (HUMULIN R;NOVOLIN R) injection 10 Units  10 Units SubCUTAneous Once Hector Javed MD           Review of Systems  Constitutional: No fever, no chills, no diaphoresis, no generalized weakness. HEENT: No blurred vision, No sore throat, no ear pain, no hair loss  Neck: denied any neck swelling, difficulty swallowing,   Cardio-pulmonary: No CP, SOB or palpitation, No orthopnea or PND. No cough or wheezing. GI: No N/V/D, no constipation, No abdominal pain, no melena or hematochezia   : Denied any dysuria, hematuria, flank pain, discharge, or incontinence. Skin: denied any rash, ulcer, Hirsute, or hyperpigmentation. MSK: denied any joint deformity, joint pain/swelling, muscle pain, or back pain. Neuro: no numbness, no tingling, no weakness, _    OBJECTIVE    Resp 18   Ht 5' 10\" (1.778 m)   Wt 207 lb (93.9 kg)   BMI 29.70 kg/m²   BP Readings from Last 4 Encounters:   08/02/22 (!) 149/93   02/11/21 (!) 142/86   02/28/20 (!) 170/114   01/17/20 137/85     Wt Readings from Last 6 Encounters:   08/12/22 207 lb (93.9 kg)   08/02/22 189 lb (85.7 kg)   02/11/21 213 lb (96.6 kg)   02/28/20 220 lb 12.8 oz (100.2 kg)   01/17/20 206 lb 9.6 oz (93.7 kg)   07/22/19 255 lb (115.7 kg)       Physical examination:  General: awake alert, oriented x3, no abnormal position or movements. HEENT: normocephalic non-traumatic, no exophthalmos   Neck: supple, no LN enlargement, no thyromegaly, no thyroid tenderness, no JVD. Pulm: Clear equal air entry no added sounds, no wheezing or rhonchi    CVS: S1 + S2, no murmur, no heave. Dorsalis pedis pulse palpable   Abd: soft lax, no tenderness, no organomegaly, audible bowel sounds. Skin: warm, no lesions, no rash.  No callus, no Ulcers, No acanthosis nigricans  Musculoskeletal: No back tenderness, no kyphosis/scoliosis    Neuro: CN intact, Monofilament sensation decreased bilateral , muscle power normal  Psych: normal mood, and affect    Review of Laboratory Data:  I personally reviewed the following lab:  Lab Results   Component Value Date/Time    WBC 4.6 08/02/2022 03:46 AM    RBC 4.02 08/02/2022 03:46 AM    HGB 12.7 08/02/2022 03:46 AM    HCT 37.0 08/02/2022 03:46 AM    MCV 92.0 08/02/2022 03:46 AM    MCH 31.6 08/02/2022 03:46 AM    MCHC 34.3 08/02/2022 03:46 AM    RDW 11.9 08/02/2022 03:46 AM     (L) 08/02/2022 03:46 AM    MPV 12.3 (H) 08/02/2022 03:46 AM      Lab Results   Component Value Date/Time     08/02/2022 10:06 AM    K 4.5 08/02/2022 10:06 AM    CO2 23 08/02/2022 10:06 AM    BUN 30 (H) 08/02/2022 10:06 AM    CREATININE 2.2 (H) 08/02/2022 10:06 AM    CALCIUM 8.9 08/02/2022 10:06 AM    LABGLOM 34 08/02/2022 10:06 AM    GFRAA 41 08/02/2022 10:06 AM      Lab Results   Component Value Date/Time    TSH 0.436 09/20/2018 08:10 AM     Lab Results   Component Value Date/Time    LABA1C 14.8 08/01/2022 09:30 AM    GLUCOSE 273 08/02/2022 10:06 AM    GLUCOSE 85 03/14/2011 11:29 AM    MALBCR 48.3 05/17/2022 10:10 AM    LABMICR 49.7 05/17/2022 10:10 AM    LABCREA 103 05/17/2022 10:10 AM     Lab Results   Component Value Date/Time    LABA1C 14.8 08/01/2022 09:30 AM    LABA1C 8.4 05/17/2022 10:10 AM    LABA1C 10.9 03/01/2021 01:36 PM     Lab Results   Component Value Date/Time    TRIG 383 08/01/2022 09:30 AM    HDL 16 08/01/2022 09:30 AM    LDLCALC 63 08/01/2022 09:30 AM    CHOL 156 08/01/2022 09:30 AM     Lab Results   Component Value Date/Time    VITD25 40 08/02/2022 03:46 AM    VITD25 36 05/17/2022 10:10 AM       ASSESSMENT & RECOMMENDATIONS   Pop Flaming, a 39 y.o.-old male seen in for the following issues     Diabetes Mellitus Type 2    Patient's diabetes is uncontrol   Continue current dose of Glimepiride 4 mg daily   Change Lantus to 35 units daily at night, Humalog to 10 units with meals + following sliding scale 2:50> 150   The patient was advised to check blood sugars 4 times a day before meals and at bedtime and send BS readings to our office in a week.   Discussed with patient A1c and blood sugar goals   Patient will need routine diabetes maintenance and prevention   Diabetes labs before next visit     Dietary noncompliance  Discussed with patient the importance of eating consistent carbohydrate meals, avoiding high glycemic index food. Also, discussed with patient the risk and negative consequences of dietary noncompliance on blood glucose control, blood pressure and weight    HLD  Contine Lipitor 20 mg daily     I personally reviewed external notes from PCP and other patient's care team providers, and personally interpreted labs associated with the above diagnosis. I also ordered labs to further assess and manage the above addressed medical conditions. Return in about 4 months (around 12/12/2022) for DM type 2, VitD deficiency. The above issues were reviewed with the patient who understood and agreed with the plan. Thank you for allowing us to participate in the care of this patient. Please do not hesitate to contact us with any additional questions. Diagnosis Orders   1. Type 2 diabetes mellitus with hyperglycemia, with long-term current use of insulin (HCC)  Hemoglobin A1C      2. Dietary noncompliance        3. Vitamin D deficiency            Anthony Downs MD  Endocrinologist, [facility]   [address] [place] [address]   Phone: [phone]  Fax: [phone]  --------------------------------------------  An electronic signature was used to authenticate this note.  Bam Yin MD on 8/12/2022 at 9:46 AM

## 2022-08-12 NOTE — PATIENT INSTRUCTIONS
Recommendations for today's visit  Continue current dose of Glimepiride   Change Lantus to 35 units daily at night   Change Humalog to 10 units with meals + following sliding scale   Blood sugar 150-200: add 2 Units of Humalog  Blood sugar 201-250 : Add 4 Units of Humalog  Blood sugar 251 - 300: Add 6 Units of Humalog  Blood sugar 301 - 350 : Add 8 Units of Humalog  Blood sugar >350 : Add 10 Units of Humalog     Check Blood sugar 4 times/day before meals and at bedtime and send us sugar log in a week    These are your blood sugar, blood pressure, cholesterol and A1c goals:  Blood sugar fastin mg/dl to 130 mg/dl  Blood sugar before meals: <150 mg/dl  Peak blood sugar lower than 180 mg/dl  A1c: between 6.5 - 7.5%    I you have any questions please call Dr. Ceferino Morfin office     Richar Perez MD  Endocrinologist, [facility])   [address] [facility], [address] [place] [address],[facility] [id number]   Phone: [phone]  Fax: [phone]  Email: [email or web address]

## 2022-08-16 ENCOUNTER — TELEPHONE (OUTPATIENT)
Dept: INTERNAL MEDICINE | Age: 37
End: 2022-08-16

## 2022-08-16 NOTE — LETTER
[facility]   [address] [facility], [address] [place] [address]  Phone: [phone]  Fax: [phone]      8/16/2022  [address] [place] [address]       Dear Alwin Hatchet: We are sorry you missed your appointment with Dr. Nayan Barth on 8/15/2022. Your health and follow-up medical care are important to us. Please call our office as soon as possible at [phone] so that we may reschedule your appointment. Please note that if you have three cancelled appointments or do not show for three consecutive appointments, no medication refills or paperwork requests will be honored until an appointment is scheduled and completed. If you have already rescheduled your appointment, please disregard this letter. We look forward to seeing you soon.        Sincerely,         Rachel Heaton LPN

## 2022-08-26 ENCOUNTER — TELEPHONE (OUTPATIENT)
Dept: ENDOCRINOLOGY | Age: 37
End: 2022-08-26

## 2022-08-30 DIAGNOSIS — I10 ESSENTIAL HYPERTENSION: ICD-10-CM

## 2022-08-30 RX ORDER — LOSARTAN POTASSIUM 100 MG/1
TABLET ORAL
Qty: 30 TABLET | Refills: 0 | OUTPATIENT
Start: 2022-08-30

## 2022-09-01 DIAGNOSIS — Z79.4 TYPE 2 DIABETES MELLITUS WITH HYPERGLYCEMIA, WITH LONG-TERM CURRENT USE OF INSULIN (HCC): Primary | ICD-10-CM

## 2022-09-01 DIAGNOSIS — E11.65 TYPE 2 DIABETES MELLITUS WITH HYPERGLYCEMIA, WITH LONG-TERM CURRENT USE OF INSULIN (HCC): Primary | ICD-10-CM

## 2022-09-01 RX ORDER — FLASH GLUCOSE SENSOR
KIT MISCELLANEOUS
Qty: 3 EACH | Refills: 5 | Status: SHIPPED | OUTPATIENT
Start: 2022-09-01

## 2022-09-11 DIAGNOSIS — E11.9 TYPE 2 DIABETES MELLITUS WITHOUT COMPLICATIONS (HCC): ICD-10-CM

## 2022-09-15 RX ORDER — GLIMEPIRIDE 4 MG/1
TABLET ORAL
Qty: 30 TABLET | Refills: 5 | Status: SHIPPED | OUTPATIENT
Start: 2022-09-15

## 2022-09-27 ENCOUNTER — TELEPHONE (OUTPATIENT)
Dept: INTERNAL MEDICINE | Age: 37
End: 2022-09-27

## 2022-09-27 RX ORDER — SODIUM BICARBONATE 650 MG/1
650 TABLET ORAL 3 TIMES DAILY
Qty: 90 TABLET | Refills: 1 | OUTPATIENT
Start: 2022-09-27

## 2022-09-27 NOTE — TELEPHONE ENCOUNTER
Attempted to reach patient and no answer. Left message stating to return call to office. Patient will need to schedule an appointment for medication refills.

## 2022-11-02 ENCOUNTER — HOSPITAL ENCOUNTER (OUTPATIENT)
Age: 37
Discharge: HOME | End: 2022-11-02
Payer: COMMERCIAL

## 2022-11-02 DIAGNOSIS — Z79.4 TYPE 2 DIABETES MELLITUS WITH HYPERGLYCEMIA, WITH LONG-TERM CURRENT USE OF INSULIN (HCC): ICD-10-CM

## 2022-11-02 DIAGNOSIS — E11.65 TYPE 2 DIABETES MELLITUS WITH HYPERGLYCEMIA, WITH LONG-TERM CURRENT USE OF INSULIN (HCC): ICD-10-CM

## 2022-11-02 LAB
GLUCOSE BLD-MCNC: 280 MG/DL (ref 74–99)
HBA1C MFR BLD: 8.1 % (ref 4–5.6)

## 2022-11-02 PROCEDURE — 82947 ASSAY GLUCOSE BLOOD QUANT: CPT

## 2022-11-02 PROCEDURE — 36415 COLL VENOUS BLD VENIPUNCTURE: CPT

## 2022-11-02 PROCEDURE — 83036 HEMOGLOBIN GLYCOSYLATED A1C: CPT

## 2022-11-08 ENCOUNTER — HOSPITAL ENCOUNTER (OUTPATIENT)
Age: 37
Discharge: HOME | End: 2022-11-08
Payer: COMMERCIAL

## 2022-11-08 LAB
ANION GAP SERPL CALCULATED.3IONS-SCNC: 13 MMOL/L (ref 7–16)
BASOPHILS ABSOLUTE: 0.05 E9/L (ref 0–0.2)
BASOPHILS RELATIVE PERCENT: 0.8 % (ref 0–2)
BUN BLDV-MCNC: 29 MG/DL (ref 6–20)
CALCIUM SERPL-MCNC: 9.3 MG/DL (ref 8.6–10.2)
CHLORIDE BLD-SCNC: 106 MMOL/L (ref 98–107)
CO2: 21 MMOL/L (ref 22–29)
CREAT SERPL-MCNC: 2.4 MG/DL (ref 0.7–1.2)
EOSINOPHILS ABSOLUTE: 0.26 E9/L (ref 0.05–0.5)
EOSINOPHILS RELATIVE PERCENT: 4.4 % (ref 0–6)
GFR SERPL CREATININE-BSD FRML MDRD: 35 ML/MIN/1.73
GLUCOSE BLD-MCNC: 231 MG/DL (ref 74–99)
HCT VFR BLD CALC: 39 % (ref 37–54)
HEMOGLOBIN: 12.7 G/DL (ref 12.5–16.5)
IMMATURE GRANULOCYTES #: 0.01 E9/L
IMMATURE GRANULOCYTES %: 0.2 % (ref 0–5)
LYMPHOCYTES ABSOLUTE: 1.52 E9/L (ref 1.5–4)
LYMPHOCYTES RELATIVE PERCENT: 25.6 % (ref 20–42)
MCH RBC QN AUTO: 31.5 PG (ref 26–35)
MCHC RBC AUTO-ENTMCNC: 32.6 % (ref 32–34.5)
MCV RBC AUTO: 96.8 FL (ref 80–99.9)
MONOCYTES ABSOLUTE: 0.47 E9/L (ref 0.1–0.95)
MONOCYTES RELATIVE PERCENT: 7.9 % (ref 2–12)
NEUTROPHILS ABSOLUTE: 3.63 E9/L (ref 1.8–7.3)
NEUTROPHILS RELATIVE PERCENT: 61.1 % (ref 43–80)
PDW BLD-RTO: 13.6 FL (ref 11.5–15)
PHOSPHORUS: 2.9 MG/DL (ref 2.5–4.5)
PLATELET # BLD: 191 E9/L (ref 130–450)
PMV BLD AUTO: 11.2 FL (ref 7–12)
POTASSIUM SERPL-SCNC: 4.3 MMOL/L (ref 3.5–5)
RBC # BLD: 4.03 E12/L (ref 3.8–5.8)
SODIUM BLD-SCNC: 140 MMOL/L (ref 132–146)
WBC # BLD: 5.9 E9/L (ref 4.5–11.5)

## 2022-11-08 PROCEDURE — 84100 ASSAY OF PHOSPHORUS: CPT

## 2022-11-08 PROCEDURE — 85025 COMPLETE CBC W/AUTO DIFF WBC: CPT

## 2022-11-08 PROCEDURE — 80048 BASIC METABOLIC PNL TOTAL CA: CPT

## 2022-11-08 PROCEDURE — 36415 COLL VENOUS BLD VENIPUNCTURE: CPT

## 2022-11-28 DIAGNOSIS — I10 ESSENTIAL HYPERTENSION: ICD-10-CM

## 2022-11-28 RX ORDER — SODIUM BICARBONATE 650 MG/1
650 TABLET ORAL 3 TIMES DAILY
Qty: 90 TABLET | Refills: 1 | OUTPATIENT
Start: 2022-11-28

## 2022-11-28 RX ORDER — ASPIRIN 81 MG/1
TABLET ORAL
Qty: 30 TABLET | Refills: 3 | OUTPATIENT
Start: 2022-11-28

## 2023-01-02 DIAGNOSIS — E10.11 TYPE 1 DIABETES MELLITUS WITH KETOACIDOTIC COMA (HCC): ICD-10-CM

## 2023-01-03 RX ORDER — INSULIN GLARGINE 100 [IU]/ML
30 INJECTION, SOLUTION SUBCUTANEOUS NIGHTLY
Qty: 2 ADJUSTABLE DOSE PRE-FILLED PEN SYRINGE | Refills: 3 | Status: SHIPPED | OUTPATIENT
Start: 2023-01-03

## 2023-01-12 DIAGNOSIS — I10 ESSENTIAL HYPERTENSION: ICD-10-CM

## 2023-01-12 RX ORDER — ASPIRIN 81 MG/1
TABLET ORAL
Qty: 30 TABLET | Refills: 3 | OUTPATIENT
Start: 2023-01-12

## 2023-01-27 ENCOUNTER — TELEPHONE (OUTPATIENT)
Dept: ENDOCRINOLOGY | Age: 38
End: 2023-01-27

## 2023-01-30 DIAGNOSIS — I10 ESSENTIAL HYPERTENSION: ICD-10-CM

## 2023-01-30 PROBLEM — E11.10 DIABETIC KETOACIDOSIS WITHOUT COMA ASSOCIATED WITH TYPE 2 DIABETES MELLITUS (HCC): Status: RESOLVED | Noted: 2022-07-31 | Resolved: 2023-01-30

## 2023-01-30 RX ORDER — ASPIRIN 81 MG/1
TABLET ORAL
Qty: 30 TABLET | Refills: 3 | OUTPATIENT
Start: 2023-01-30

## 2023-01-31 ENCOUNTER — OFFICE VISIT (OUTPATIENT)
Dept: INTERNAL MEDICINE | Age: 38
End: 2023-01-31
Payer: COMMERCIAL

## 2023-01-31 VITALS
TEMPERATURE: 97.5 F | WEIGHT: 220 LBS | HEIGHT: 70 IN | RESPIRATION RATE: 18 BRPM | DIASTOLIC BLOOD PRESSURE: 78 MMHG | HEART RATE: 78 BPM | BODY MASS INDEX: 31.5 KG/M2 | SYSTOLIC BLOOD PRESSURE: 129 MMHG | OXYGEN SATURATION: 97 %

## 2023-01-31 DIAGNOSIS — N18.30 CKD STAGE 3 DUE TO TYPE 2 DIABETES MELLITUS (HCC): ICD-10-CM

## 2023-01-31 DIAGNOSIS — E11.22 CKD STAGE 3 DUE TO TYPE 2 DIABETES MELLITUS (HCC): ICD-10-CM

## 2023-01-31 DIAGNOSIS — R45.851 SUICIDAL THOUGHTS: ICD-10-CM

## 2023-01-31 DIAGNOSIS — I51.7 LVH (LEFT VENTRICULAR HYPERTROPHY): ICD-10-CM

## 2023-01-31 DIAGNOSIS — Z22.322 CARRIER OR SUSPECTED CARRIER OF METHICILLIN RESISTANT STAPHYLOCOCCUS AUREUS: ICD-10-CM

## 2023-01-31 DIAGNOSIS — I10 PRIMARY HYPERTENSION: Primary | ICD-10-CM

## 2023-01-31 DIAGNOSIS — L98.9 SKIN LESION: ICD-10-CM

## 2023-01-31 PROCEDURE — G8417 CALC BMI ABV UP PARAM F/U: HCPCS | Performed by: INTERNAL MEDICINE

## 2023-01-31 PROCEDURE — 2022F DILAT RTA XM EVC RTNOPTHY: CPT | Performed by: INTERNAL MEDICINE

## 2023-01-31 PROCEDURE — 99212 OFFICE O/P EST SF 10 MIN: CPT | Performed by: INTERNAL MEDICINE

## 2023-01-31 PROCEDURE — G8427 DOCREV CUR MEDS BY ELIG CLIN: HCPCS | Performed by: INTERNAL MEDICINE

## 2023-01-31 PROCEDURE — 3046F HEMOGLOBIN A1C LEVEL >9.0%: CPT | Performed by: INTERNAL MEDICINE

## 2023-01-31 PROCEDURE — 3078F DIAST BP <80 MM HG: CPT | Performed by: INTERNAL MEDICINE

## 2023-01-31 PROCEDURE — 99213 OFFICE O/P EST LOW 20 MIN: CPT | Performed by: INTERNAL MEDICINE

## 2023-01-31 PROCEDURE — G8484 FLU IMMUNIZE NO ADMIN: HCPCS | Performed by: INTERNAL MEDICINE

## 2023-01-31 PROCEDURE — 3074F SYST BP LT 130 MM HG: CPT | Performed by: INTERNAL MEDICINE

## 2023-01-31 PROCEDURE — 4004F PT TOBACCO SCREEN RCVD TLK: CPT | Performed by: INTERNAL MEDICINE

## 2023-01-31 RX ORDER — ARIPIPRAZOLE 5 MG/1
TABLET ORAL
COMMUNITY
Start: 2022-12-21

## 2023-01-31 SDOH — ECONOMIC STABILITY: FOOD INSECURITY: WITHIN THE PAST 12 MONTHS, THE FOOD YOU BOUGHT JUST DIDN'T LAST AND YOU DIDN'T HAVE MONEY TO GET MORE.: SOMETIMES TRUE

## 2023-01-31 SDOH — ECONOMIC STABILITY: TRANSPORTATION INSECURITY
IN THE PAST 12 MONTHS, HAS LACK OF TRANSPORTATION KEPT YOU FROM MEETINGS, WORK, OR FROM GETTING THINGS NEEDED FOR DAILY LIVING?: NO

## 2023-01-31 SDOH — ECONOMIC STABILITY: INCOME INSECURITY: IN THE LAST 12 MONTHS, WAS THERE A TIME WHEN YOU WERE NOT ABLE TO PAY THE MORTGAGE OR RENT ON TIME?: YES

## 2023-01-31 SDOH — ECONOMIC STABILITY: HOUSING INSECURITY: IN THE LAST 12 MONTHS, HOW MANY PLACES HAVE YOU LIVED?: 1

## 2023-01-31 SDOH — ECONOMIC STABILITY: HOUSING INSECURITY
IN THE LAST 12 MONTHS, WAS THERE A TIME WHEN YOU DID NOT HAVE A STEADY PLACE TO SLEEP OR SLEPT IN A SHELTER (INCLUDING NOW)?: NO

## 2023-01-31 SDOH — ECONOMIC STABILITY: FOOD INSECURITY: WITHIN THE PAST 12 MONTHS, YOU WORRIED THAT YOUR FOOD WOULD RUN OUT BEFORE YOU GOT MONEY TO BUY MORE.: OFTEN TRUE

## 2023-01-31 SDOH — ECONOMIC STABILITY: TRANSPORTATION INSECURITY
IN THE PAST 12 MONTHS, HAS THE LACK OF TRANSPORTATION KEPT YOU FROM MEDICAL APPOINTMENTS OR FROM GETTING MEDICATIONS?: NO

## 2023-01-31 ASSESSMENT — ENCOUNTER SYMPTOMS
CONSTIPATION: 0
ABDOMINAL PAIN: 0
COLOR CHANGE: 0
COUGH: 0
SHORTNESS OF BREATH: 0
RHINORRHEA: 0
BACK PAIN: 0
VOMITING: 0
DIARRHEA: 0

## 2023-01-31 ASSESSMENT — PATIENT HEALTH QUESTIONNAIRE - PHQ9
SUM OF ALL RESPONSES TO PHQ QUESTIONS 1-9: 13
8. MOVING OR SPEAKING SO SLOWLY THAT OTHER PEOPLE COULD HAVE NOTICED. OR THE OPPOSITE, BEING SO FIGETY OR RESTLESS THAT YOU HAVE BEEN MOVING AROUND A LOT MORE THAN USUAL: 0
2. FEELING DOWN, DEPRESSED OR HOPELESS: 3
10. IF YOU CHECKED OFF ANY PROBLEMS, HOW DIFFICULT HAVE THESE PROBLEMS MADE IT FOR YOU TO DO YOUR WORK, TAKE CARE OF THINGS AT HOME, OR GET ALONG WITH OTHER PEOPLE: 2
SUM OF ALL RESPONSES TO PHQ QUESTIONS 1-9: 13
4. FEELING TIRED OR HAVING LITTLE ENERGY: 2
1. LITTLE INTEREST OR PLEASURE IN DOING THINGS: 1
5. POOR APPETITE OR OVEREATING: 0
10. IF YOU CHECKED OFF ANY PROBLEMS, HOW DIFFICULT HAVE THESE PROBLEMS MADE IT FOR YOU TO DO YOUR WORK, TAKE CARE OF THINGS AT HOME, OR GET ALONG WITH OTHER PEOPLE: EXTREMELY DIFFICULT
7. TROUBLE CONCENTRATING ON THINGS, SUCH AS READING THE NEWSPAPER OR WATCHING TELEVISION: 2
2. FEELING DOWN, DEPRESSED OR HOPELESS: SEVERAL DAYS
SUM OF ALL RESPONSES TO PHQ9 QUESTIONS 1 & 2: 4
9. THOUGHTS THAT YOU WOULD BE BETTER OFF DEAD, OR OF HURTING YOURSELF: 0
6. FEELING BAD ABOUT YOURSELF - OR THAT YOU ARE A FAILURE OR HAVE LET YOURSELF OR YOUR FAMILY DOWN: 3
SUM OF ALL RESPONSES TO PHQ QUESTIONS 1-9: 13
1. LITTLE INTEREST OR PLEASURE IN DOING THINGS: NOT AT ALL
SUM OF ALL RESPONSES TO PHQ QUESTIONS 1-9: 13
3. TROUBLE FALLING OR STAYING ASLEEP: 2
SUM OF ALL RESPONSES TO PHQ9 QUESTIONS 1 & 2: 1

## 2023-01-31 ASSESSMENT — SOCIAL DETERMINANTS OF HEALTH (SDOH): HOW HARD IS IT FOR YOU TO PAY FOR THE VERY BASICS LIKE FOOD, HOUSING, MEDICAL CARE, AND HEATING?: SOMEWHAT HARD

## 2023-01-31 ASSESSMENT — LIFESTYLE VARIABLES: HOW OFTEN DO YOU HAVE A DRINK CONTAINING ALCOHOL: MONTHLY OR LESS

## 2023-01-31 NOTE — PATIENT INSTRUCTIONS
[facility] Internal Medicine Resident Service    Activity as tolerated  Diet: low fat and low sodium  Be compliant with your medications and take them as prescribed. Special Instructions:     MRSA bares checked at this visit. If positive, we will recommend decolonization with topical agents applied to nostrils. Continue to take rest of medications as prescribed. No labs were ordered this clinic visit. Continue to exercise to maintain good health       Hospital Follow up: Other Follow-Ups:    Future Appointments   Date Time Provider Lucio Romero   2/1/2023  9:45 AM Mayuri Diego MD [facility]       If a referral was placed on your behalf during your visit, you should expect to receive information about the date and time of your referral appointment within 7-10 days. If not, please call the office at [phone] and ask to speak to the . Should you have further questions in regards to this visit, you can review your clinical note and after visit summary document on your My [employer] [facility] [employer] account. Other questions can be directed to our nurse line at [phone]. Discharge instruction reviewed with Patient. Patient verbalizes understanding. Script given.       Christopher Rodriguez MD PGY-2  1/31/2023  3:22 PM

## 2023-01-31 NOTE — ASSESSMENT & PLAN NOTE
·  Healed hyperpigmented lesions: 3 on left chest and 1 on right arm   · P tunable to clearly describe initial lesion  · Previous h/o similar lesions in 2019, managed for MRSA skin infection.  Completed PO Abx course   ·

## 2023-01-31 NOTE — PROGRESS NOTES
Pt screened positive for SDOH related to financial strain, transportation, housing and or food insecurity and declined further contact for assessment/resources.

## 2023-01-31 NOTE — PROGRESS NOTES
Katrin Cooper [facility]  Internal Medicine Residency Clinic    Attending Physician Statement  I have discussed the case, including pertinent history and exam findings with the resident physician. I have seen and examined the patient and the key elements of the encounter have been performed by me. I agree with the assessment, plan and orders as documented by the resident. I have reviewed all pertinent PMHx, PSHx, FamHx, SocialHx, medications, and allergies and updated history as appropriate. Patient presents for routine follow up of medical problems. Pt presented with the concern that his previous bacterial infection would recur. Pt has recent history of bacterial skin infection, seen by ID, doxycycline was placed but skin culture only showed probable skin bernard, also pt has same infection two years ago. Examination showed headed pigmented 4 spots in left inferior axillary region and one at periumbilical area, no active infection noted. Pt reported those were there result of previous infection last year. Recommended nasal culture to RO colonization of S. Aureus and may treat if culture is positive. No need antibiotic PO or ointment at this time. Remainder of medical problems as per resident note.     Christiana Briceño MD  1/31/2023 3:12 PM

## 2023-01-31 NOTE — PROGRESS NOTES
Katrin Cooper [facility]  Internal Medicine Residency Program  [facility] Note      SUBJECTIVE:  CC: had concerns including Other (Per pt dark spots on skin, started around thanksgiving and cleared up after barbara, ). HPI:Ashkan Martinez (:  1985) is a 44 y.o. male here for a routine evaluation of the following: Other (Per pt dark spots on skin, started around thanksgiving and cleared up after barbara, )         Patient is a pleasant 44 y.o. male who  has a past medical history of Chest pain, Diabetic ketoacidosis without coma associated with type 2 diabetes mellitus ([address] HTN (hypertension), and Hydrocele, left. He presents to the clinic to address following concerns:    Acute concerns this visit:   Made an ppt with hope of getting antibiotics for skin lesions as before in 2019. Lesions have completely healed macular lesions (4 at left axilar and 1 on dorsal distal arm) with hyperpigmentation. He has a h/o CA MRSA skin infection which was treated in 2019 by ID with PO doxy. He was told to use antibiotic soap since then which she has been using until last month when the lesions reviewed. Lesions were painless, not itchy, and did not cause distress however patient was concerned that he may need antibiotics to clear them. He states he called for an earlier appointment however he was unable to get a schedule in clinic today. At this time, lesions have already healed and his hope of coming to the clinic was to get antibiotics. He denies recent sickness or known exposure to \"monkeypox\". Otherwise he states he is in his usual state of health. No other acute concerns. Patient's son is present during encounter and is playfully happy in room. Patient scored high on his depression screen. On discussion of suicidal thoughts which she had mentioned in passing, further discussion were pursued. Pt states he has these SI/HI sometimes but not currently.  He owns a gun which he claims is unloaded and locked away. He denies child's knowledge of gun access or ever practicing with child. He endorses intermittent spousal conflicts but denies physically harming or intent to harm her. He states that his wife and child keep him going. He is established with [facility] who sees him once/ month. When asked whether or not he wanted to talk with Jeffery Louie, he was initially adamant about speaking with her but was later open to. Jeffery Louie was able to speak with pt prior to discharge. Assessment   1. Primary hypertension  Assessment & Plan:  IO BP: 129/78  On amlodipine 10 mg daily, losartan 100 mg daily and bumex     2. CKD stage 3 due to type 2 diabetes mellitus (HCC)  Assessment & Plan:  Baseline CR 2.3-2.9   GFR 41  On bicarb and Klor-Con tablets  Follows with nephrology    3. LVH (left ventricular hypertrophy)  Assessment & Plan:   Echo on 8/2016   Severe concentric left ventricular hypertrophy. Normal left ventricular systolic function. EF 55-60%. There is doppler evidence of stage II diastolic dysfunction. 4. 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 Suicidal thoughts  -     Alma Raymundo, [address], Counseling Services, [facility] Clinics Only  6. Skin lesion  Assessment & Plan:   Healed hyperpigmented lesions: 3 on left chest and 1 on right arm   P tunable to clearly describe initial lesion  Previous h/o similar lesions in 2019, managed for MRSA skin infection. Completed PO Abx course     Plan   MRSA nares obtained to determine colonization and plan to treat if positive. We will call patient with results and offer treatment if positive. Positive depression screen.  Jeffery Jacy able to evaluate pt for SI/HI, services appreciated   Encouraged to continue using antibacteria soap   No antibiotics for now     ______________________________________________________________________________    Lab Results Component Value Date/Time    LABA1C 8.1 2022 04:25 PM    LABA1C 14.8 2022 09:30 AM    LABA1C 8.4 2022 10:10 AM    LABA1C 10.9 2021 01:36 PM       eA mg/dl  Average blood glucose based on most recent A1c. Curtesy: eAG Calculator    PMH according to chart:      Diagnosis Date    Chest pain     Diabetic ketoacidosis without coma associated with type 2 diabetes mellitus [facility] 2022    HTN (hypertension)     Hydrocele, left 10/28/2010       Review of Systems   Constitutional:  Negative for chills, fatigue, fever and unexpected weight change. HENT:  Negative for congestion and rhinorrhea. Respiratory:  Negative for cough and shortness of breath. Cardiovascular:  Negative for chest pain and palpitations. Gastrointestinal:  Negative for abdominal pain, constipation, diarrhea and vomiting. Genitourinary:  Negative for dysuria and frequency. Musculoskeletal:  Negative for arthralgias and back pain. Skin:  Negative for color change, pallor and wound. Neurological:  Negative for syncope and light-headedness. Psychiatric/Behavioral:  Negative for dysphoric mood, hallucinations, self-injury and suicidal ideas. Behavioral problem: Chronic depression. The patient is not nervous/anxious. Outpatient Medications Marked as Taking for the 23 encounter (Office Visit) with Alondra Castro MD   Medication Sig Dispense Refill    ARIPiprazole (ABILIFY) 5 MG tablet TAKE 1 TABLET DAILY FOR 14 DAYS THEN 2 TABLETS DAILY      LANTUS SOLOSTAR 100 UNIT/ML injection pen INJECT 30 UNITS INTO THE SKIN NIGHTLY 2 Adjustable Dose Pre-filled Pen Syringe 3    glimepiride (AMARYL) 4 MG tablet TAKE 1 TABLET BY MOUTH EVERY DAY BEFORE BREAKFAST 30 tablet 5    Continuous Blood Gluc Sensor (FREESTYLE ZAINAB 2 SENSOR) MISC Change every 14 days 3 each 5    valsartan (DIOVAN) 320 MG tablet Take 320 mg by mouth in the morning.       vitamin D (CHOLECALCIFEROL) 125 MCG (5000 UT) CAPS capsule Take 5,000 Units by mouth in the morning. blood glucose test strips (ONETOUCH ULTRA) strip 1 each by In Vitro route daily As needed. 50 each 11    Continuous Blood Gluc Sensor (FREESTYLE ZAINAB 14 DAY SENSOR) MISC Please change sensor every 14 days 3 each 2    amLODIPine (NORVASC) 10 MG tablet Take 1 tablet by mouth in the morning. 30 tablet 3    insulin lispro, 1 Unit Dial, (HUMALOG KWIKPEN) 100 UNIT/ML SOPN Take 5 units if BG >300 mg/dl, do not take more freq than every 4 hours, max dose of 30 units/day 2 pen 3    KLOR-CON M20 20 MEQ extended release tablet TAKE 1 TABLET BY MOUTH EVERY OTHER DAY 60 tablet 3    aspirin (ASPIRIN LOW DOSE) 81 MG EC tablet TAKE 1 TABLET BY MOUTH EVERY DAY 30 tablet 3    atorvastatin (LIPITOR) 20 MG tablet Take 1 tablet by mouth in the morning. 90 tablet 2    bumetanide (BUMEX) 1 MG tablet TAKE 1 TABLET BY MOUTH TWICE A DAY 30 tablet 5    metoprolol succinate (TOPROL XL) 200 MG extended release tablet TAKE 1 TABLET BY MOUTH EVERY DAY 30 tablet 3    Blood Glucose Monitoring Suppl (ONE TOUCH ULTRA 2) w/Device KIT Use to check BG 4x each day 1 kit 0    glucose monitoring kit (FREESTYLE) monitoring kit Use once.  1 kit 0    glucagon 1 MG injection  to inject 1 mg into the muscle or under the skin if you become unconscious for any reason other than suspected high BG 1 kit 3    Insulin Pen Needle 32G X 4 MM MISC Use to take long acting insulin once each day 100 each 5    glucagon 1 MG injection Infuse 1 mg intravenously once for 1 dose Administer 1 mg under skin if blood sugars go <70 with in ability to take glucose tablets 1 kit 3    glucose ([facility] GLUCOSE) 4 g chewable tablet Take 4 tablets by mouth as needed for Low blood sugar (<70 mg/dl) every 15 minutes until BG rises to >70mg/dl 60 tablet 3    minoxidil (LONITEN) 10 MG tablet TAKE 1 TABLET BY MOUTH THREE TIMES A DAY 30 tablet 0    FREESTYLE LANCETS MISC 1 each by Does not apply route daily 400 each 3    Lancets MISC 1 each by Does not apply route 4 times daily 600 each 1    ergocalciferol (DRISDOL) 32417 units capsule Take with meal 2x/week (Patient taking differently: once a week Take with meal 2x/week) 8 capsule 3    amphetamine-dextroamphetamine (ADDERALL) 5 MG tablet TAKE 1 TABLET BY MOUTH TWICE A DAY  0    Blood Pressure Monitoring (ADULT BLOOD PRESSURE CUFF LG) KIT 1 kit by Does not apply route daily 1 kit 0       Social History     Tobacco Use    Smoking status: Every Day     Packs/day: 1.00     Years: 8.00     Pack years: 8.00     Types: Cigars, Cigarettes    Smokeless tobacco: Never    Tobacco comments:     pack aday   Vaping Use    Vaping Use: Never used   Substance Use Topics    Alcohol use: Yes     Comment: occasional    Drug use: No     Types: Marijuana Jackie Guillaume)     Comment: QUIT MARIJUANA       Current Outpatient Medications   Medication Instructions    amLODIPine (NORVASC) 10 mg, Oral, DAILY    amphetamine-dextroamphetamine (ADDERALL) 5 MG tablet TAKE 1 TABLET BY MOUTH TWICE A DAY    ARIPiprazole (ABILIFY) 5 MG tablet TAKE 1 TABLET DAILY FOR 14 DAYS THEN 2 TABLETS DAILY    aspirin (ASPIRIN LOW DOSE) 81 MG EC tablet TAKE 1 TABLET BY MOUTH EVERY DAY    atorvastatin (LIPITOR) 20 mg, Oral, DAILY    Blood Glucose Monitoring Suppl (ONE TOUCH ULTRA 2) w/Device KIT Use to check BG 4x each day    blood glucose test strips (ONETOUCH ULTRA) strip 1 each, In Vitro, DAILY, As needed.     Blood Pressure Monitoring (ADULT BLOOD PRESSURE CUFF LG) KIT 1 kit, Does not apply, DAILY    bumetanide (BUMEX) 1 MG tablet TAKE 1 TABLET BY MOUTH TWICE A DAY    Continuous Blood Gluc Sensor (FREESTYLE ZAINAB 14 DAY SENSOR) MISC Please change sensor every 14 days    Continuous Blood Gluc Sensor (FREESTYLE ZAINAB 2 SENSOR) MISC Change every 14 days    ergocalciferol (DRISDOL) 56309 units capsule Take with meal 2x/week    FLUoxetine (PROZAC) 20 MG capsule TAKE 1 CAPSULE BY MOUTH EVERY DAY IN THE MORNING    FREESTYLE LANCETS MISC 1 each, Does not apply, DAILY    glimepiride (AMARYL) 4 MG tablet TAKE 1 TABLET BY MOUTH EVERY DAY BEFORE BREAKFAST    glucagon 1 MG injection  to inject 1 mg into the muscle or under the skin if you become unconscious for any reason other than suspected high BG    glucagon 1 mg, IntraVENous, ONCE, Administer 1 mg under skin if blood sugars go <70 with in ability to take glucose tablets    glucose ([facility] GLUCOSE) 16 g, Oral, PRN, (<70 mg/dl) every 15 minutes until BG rises to >70mg/dl    glucose monitoring kit (FREESTYLE) monitoring kit Use once. insulin lispro, 1 Unit Dial, (HUMALOG KWIKPEN) 100 UNIT/ML SOPN Take 5 units if BG >300 mg/dl, do not take more freq than every 4 hours, max dose of 30 units/day    Insulin Pen Needle 32G X 4 MM MISC Use to take long acting insulin once each day    KLOR-CON M20 20 MEQ extended release tablet TAKE 1 TABLET BY MOUTH EVERY OTHER DAY    Lancets MISC 1 each, Does not apply, 4 TIMES DAILY    Lantus SoloStar 30 Units, SubCUTAneous, NIGHTLY    LATUDA 20 MG TABS tablet TAKE 1 TABLET BY MOUTH EVERY DAY EVERY AFTERNOON    metoprolol succinate (TOPROL XL) 200 MG extended release tablet TAKE 1 TABLET BY MOUTH EVERY DAY    minoxidil (LONITEN) 10 MG tablet TAKE 1 TABLET BY MOUTH THREE TIMES A DAY    sodium bicarbonate 650 mg, Oral, 3 TIMES DAILY    traZODone (DESYREL) 50 MG tablet TAKE 1/2 TABLET BY MOUTH AT BEDTIME    valsartan (DIOVAN) 320 mg, Oral, DAILY    vitamin D (CHOLECALCIFEROL) 5,000 Units, Oral, DAILY       I have reviewed all pertinent PMHx, PSHx, FamHx, SocialHx, medications, and allergies and updated history as appropriate. OBJECTIVE:    VS: /78 (Site: Left Upper Arm, Position: Sitting, Cuff Size: Large Adult)   Pulse 78   Temp 97.5 °F (36.4 °C) (Temporal)   Resp 18   Ht 5' 10\" (1.778 m)   Wt 220 lb (99.8 kg)   SpO2 97%   BMI 31.57 kg/m²     Physical Exam  Vitals and nursing note reviewed. Constitutional:       General: He is not in acute distress. Appearance: He is not ill-appearing, toxic-appearing or diaphoretic. HENT:      Head: Normocephalic and atraumatic. Nose: Nose normal.      Mouth/Throat:      Mouth: Mucous membranes are moist.      Pharynx: Oropharynx is clear. Eyes:      General: No scleral icterus. Right eye: No discharge. Left eye: No discharge. Conjunctiva/sclera: Conjunctivae normal.      Pupils: Pupils are equal, round, and reactive to light. Cardiovascular:      Rate and Rhythm: Normal rate and regular rhythm. Pulses: Normal pulses. Heart sounds: Normal heart sounds. No murmur heard. No friction rub. No gallop. Pulmonary:      Effort: Pulmonary effort is normal. No respiratory distress. Breath sounds: Normal breath sounds. No wheezing, rhonchi or rales. Abdominal:      General: Bowel sounds are normal. There is no distension. Palpations: Abdomen is soft. There is no mass. Tenderness: There is no abdominal tenderness. There is no guarding or rebound. Hernia: No hernia is present. Musculoskeletal:      Right lower leg: No edema. Left lower leg: No edema. Skin:     Capillary Refill: Capillary refill takes less than 2 seconds. Neurological:      Mental Status: He is alert. Psychiatric:         Mood and Affect: Mood is depressed. Behavior: Behavior is not aggressive. No results found.     BP Readings from Last 4 Encounters:   01/31/23 129/78   08/02/22 (!) 149/93   02/11/21 (!) 142/86   02/28/20 (!) 170/114       Wt Readings from Last 6 Encounters:   01/31/23 220 lb (99.8 kg)   08/12/22 207 lb (93.9 kg)   08/02/22 189 lb (85.7 kg)   02/11/21 213 lb (96.6 kg)   02/28/20 220 lb 12.8 oz (100.2 kg)   01/17/20 206 lb 9.6 oz (93.7 kg)         Labs:  Lab Results   Component Value Date    WBC 5.9 11/08/2022    HGB 12.7 11/08/2022    HCT 39.0 11/08/2022     11/08/2022    CHOL 156 08/01/2022    TRIG 383 (H) 08/01/2022    HDL 16 08/01/2022    ALT 14 2022    AST 9 2022     2022    K 4.3 2022     2022    CREATININE 2.4 (H) 2022    BUN 29 (H) 2022    CO2 21 (L) 2022    TSH 0.436 2018    INR 1.0 2016    LABA1C 8.1 (H) 2022    LABMICR 49.7 (H) 2022       Imaging:   NM LUNG SCAN PERFUSION ONLY  Narrative: Patient MRN: [id number]  : 1985  Age:  39 years  Gender: Male  Order Date: 2022 10:27 PM  Exam: NM LUNG SCAN PERFUSION ONLY  Number of Images: 5 views  Indication:   Shortness of breath, recent COVID, stage IV kidney  disease   Shortness of breath, recent COVID, stage IV kidney disease  Decision Support Exception - unselect if not a suspected or confirmed  emergency medical condition->Emergency Medical Condition (MA)  What reading provider will be dictating this exam?->[facility]  Comparison: Chest x-ray 2022    Findings:     Ventilation images  were not performed    The patient received 6 millicuries of technetium MAA. Perfusion images reveal no segmental perfusion defects. Impression: Low probability for pulmonary embolism  XR CHEST (2 VW)  Narrative: EXAMINATION:  TWO XRAY VIEWS OF THE CHEST    2022 8:15 pm    COMPARISON:  2018    HISTORY:  ORDERING SYSTEM PROVIDED HISTORY: SOB, recent COVID last week  TECHNOLOGIST PROVIDED HISTORY:  Reason for exam:->SOB, recent COVID last week  What reading provider will be dictating this exam?->[facility]    FINDINGS:  PA and left lateral views of the chest demonstrate satisfactory expansion  lungs which are clear. The cardiac silhouette appears unremarkable. There  is no evidence of a pneumothorax. The soft tissues and the osseous  structures appear unremarkable. Impression: No acute cardiopulmonary process.         HCM    To be discussed with PCP    Health Maintenance Due   Topic Date Due    Varicella vaccine (1 of 2 - 2-dose childhood series) Never done    Hepatitis B vaccine (1 of 3 - Risk 3-dose series) Never done    Diabetic foot exam  11/09/2019    COVID-19 Vaccine (2 - Pfizer series) 01/18/2022    Flu vaccine (1) 08/01/2022       RTC   Return in about 5 weeks (around 3/7/2023) for next PCP clinic appt for f/u . Future Appointments   Date Time Provider Lucio Romero   2/1/2023  9:45 AM Madie Rouse MD [facility]   3/14/2023  3:00 PM Forrest Negro MD [facility]       I have reviewed my findings and recommendations with Chiara Hicks and Dr Patrick Gaston. Electronically signed by Lubna Nguyen MD, PhD PGY- 2 on 2/1/2023. Internal Medicine Resident    This report was completed using computerWallit voiced recognition software. Every effort has been made to ensure accuracy; however, inadvertent computerized transcription errors may be present.

## 2023-01-31 NOTE — ASSESSMENT & PLAN NOTE
Echo on 8/2016   · Severe concentric left ventricular hypertrophy. · Normal left ventricular systolic function. · EF 55-60%. · There is doppler evidence of stage II diastolic dysfunction.

## 2023-02-01 ENCOUNTER — TELEMEDICINE (OUTPATIENT)
Dept: ENDOCRINOLOGY | Age: 38
End: 2023-02-01

## 2023-02-01 DIAGNOSIS — E55.9 VITAMIN D DEFICIENCY: ICD-10-CM

## 2023-02-01 DIAGNOSIS — Z79.4 TYPE 2 DIABETES MELLITUS WITHOUT COMPLICATION, WITH LONG-TERM CURRENT USE OF INSULIN (HCC): Primary | ICD-10-CM

## 2023-02-01 DIAGNOSIS — Z91.119 DIETARY NONCOMPLIANCE: ICD-10-CM

## 2023-02-01 DIAGNOSIS — E11.9 TYPE 2 DIABETES MELLITUS WITHOUT COMPLICATION, WITH LONG-TERM CURRENT USE OF INSULIN (HCC): Primary | ICD-10-CM

## 2023-02-01 NOTE — PROGRESS NOTES
Charmayne Maywood was read the following message We want to confirm that, for purposes of billing, this is a virtual visit with your provider for which we will submit a claim for reimbursement with your insurance company. You will be responsible for any copays, coinsurance amounts or other amounts not covered by your insurance company. If you do not accept this, unfortunately we will not be able to schedule or proceed with a virtual visit with the provider. Do you accept? Stephanie Hodge responded Yes .

## 2023-02-01 NOTE — PROGRESS NOTES
[address] [place] [address] Department of Endocrinology Diabetes and Metabolism   [address] [facility] [address]   Phone: [phone]  Fax: [phone]    Date of Service: 2/1/2023  Primary Care Physician: David Denson MD  Provider: Mike Torres MD    Reason for the visit:  DM type 2     History of Present Illness: The history is provided by the patient. No  was used. Accuracy of the patient data is excellent. Shaggy Bone is a very pleasant 44 y.o. male seen today for diabetes management     Shaggy Bone was diagnosed with diabetes at age   and currently on Glimepiride 4 mg daily, Lantus 30u nightly (ran-out recently), Humalog 5u with meals   Uses [employer] CGM. I have reviewed CGM download (in media) and readings are better   Most recent A1c results summarized below  Lab Results   Component Value Date/Time    LABA1C 8.1 11/02/2022 04:25 PM    LABA1C 14.8 08/01/2022 09:30 AM    LABA1C 8.4 05/17/2022 10:10 AM     Patient has had no hypoglycemic episodes   The patient hasn't been mindful of what has been eating and wasn't following diabetes diet    I reviewed current medications and the patient has no issues with diabetes medications  Shaggy Bone is up to date with eye exam and denied any history of diabetic retinopathy   The patient her performs his own feet care  Microvascular complications:  No Retinopathy, Nephropathy or Neuropathy   Macrovascular complications: no CAD, PVD, or Stroke    PAST MEDICAL HISTORY   Past Medical History:   Diagnosis Date    Chest pain     Diabetic ketoacidosis without coma associated with type 2 diabetes mellitus ([address] 7/31/2022    HTN (hypertension)     Hydrocele, left 10/28/2010       PAST SURGICAL HISTORY   Past Surgical History:   Procedure Laterality Date    HYDROCELE EXCISION  November 2010       SOCIAL HISTORY   Tobacco:   reports that he has been smoking cigars and cigarettes.  He has a 8.00 pack-year smoking history. He has never used smokeless tobacco.  Alcohol:   reports current alcohol use. Drugs:   reports no history of drug use. FAMILY HISTORY   Family History   Problem Relation Age of Onset    Diabetes Maternal Grandmother     Heart Failure Maternal Grandmother     Diabetes Mother     Hypertension Mother     Crohn's Disease Sister     Hypertension Sister        ALLERGIES AND DRUG REACTIONS   No Known Allergies    CURRENT MEDICATIONS   Current Outpatient Medications   Medication Sig Dispense Refill    ARIPiprazole (ABILIFY) 5 MG tablet TAKE 1 TABLET DAILY FOR 14 DAYS THEN 2 TABLETS DAILY      glimepiride (AMARYL) 4 MG tablet TAKE 1 TABLET BY MOUTH EVERY DAY BEFORE BREAKFAST 30 tablet 5    Continuous Blood Gluc Sensor (FREESTYLE ZAINAB 2 SENSOR) MISC Change every 14 days 3 each 5    valsartan (DIOVAN) 320 MG tablet Take 320 mg by mouth in the morning. vitamin D (CHOLECALCIFEROL) 125 MCG (5000 UT) CAPS capsule Take 5,000 Units by mouth in the morning. blood glucose test strips (ONETOUCH ULTRA) strip 1 each by In Vitro route daily As needed. 50 each 11    Continuous Blood Gluc Sensor (FREESTYLE ZAINAB 14 DAY SENSOR) MISC Please change sensor every 14 days 3 each 2    amLODIPine (NORVASC) 10 MG tablet Take 1 tablet by mouth in the morning. 30 tablet 3    insulin lispro, 1 Unit Dial, (HUMALOG KWIKPEN) 100 UNIT/ML SOPN Take 5 units if BG >300 mg/dl, do not take more freq than every 4 hours, max dose of 30 units/day 2 pen 3    sodium bicarbonate 650 MG tablet Take 1 tablet by mouth in the morning and 1 tablet at noon and 1 tablet before bedtime. 90 tablet 1    KLOR-CON M20 20 MEQ extended release tablet TAKE 1 TABLET BY MOUTH EVERY OTHER DAY 60 tablet 3    aspirin (ASPIRIN LOW DOSE) 81 MG EC tablet TAKE 1 TABLET BY MOUTH EVERY DAY 30 tablet 3    atorvastatin (LIPITOR) 20 MG tablet Take 1 tablet by mouth in the morning.  90 tablet 2    bumetanide (BUMEX) 1 MG tablet TAKE 1 TABLET BY MOUTH TWICE A DAY 30 tablet 5    metoprolol succinate (TOPROL XL) 200 MG extended release tablet TAKE 1 TABLET BY MOUTH EVERY DAY 30 tablet 3    Blood Glucose Monitoring Suppl (ONE TOUCH ULTRA 2) w/Device KIT Use to check BG 4x each day 1 kit 0    glucose monitoring kit (FREESTYLE) monitoring kit Use once.  1 kit 0    glucagon 1 MG injection  to inject 1 mg into the muscle or under the skin if you become unconscious for any reason other than suspected high BG 1 kit 3    Insulin Pen Needle 32G X 4 MM MISC Use to take long acting insulin once each day 100 each 5    glucose ([facility] GLUCOSE) 4 g chewable tablet Take 4 tablets by mouth as needed for Low blood sugar (<70 mg/dl) every 15 minutes until BG rises to >70mg/dl 60 tablet 3    FLUoxetine (PROZAC) 20 MG capsule   2    minoxidil (LONITEN) 10 MG tablet TAKE 1 TABLET BY MOUTH THREE TIMES A DAY 30 tablet 0    FREESTYLE LANCETS MISC 1 each by Does not apply route daily 400 each 3    Lancets MISC 1 each by Does not apply route 4 times daily 600 each 1    traZODone (DESYREL) 50 MG tablet   2    ergocalciferol (DRISDOL) 48301 units capsule Take with meal 2x/week (Patient taking differently: once a week Take with meal 2x/week) 8 capsule 3    amphetamine-dextroamphetamine (ADDERALL) 5 MG tablet TAKE 1 TABLET BY MOUTH TWICE A DAY  0    Blood Pressure Monitoring (ADULT BLOOD PRESSURE CUFF LG) KIT 1 kit by Does not apply route daily 1 kit 0    LANTUS SOLOSTAR 100 UNIT/ML injection pen INJECT 30 UNITS INTO THE SKIN NIGHTLY (Patient not taking: Reported on 2/1/2023) 2 Adjustable Dose Pre-filled Pen Syringe 3    glucagon 1 MG injection Infuse 1 mg intravenously once for 1 dose Administer 1 mg under skin if blood sugars go <70 with in ability to take glucose tablets 1 kit 3    LATUDA 20 MG TABS tablet TAKE 1 TABLET BY MOUTH EVERY DAY EVERY AFTERNOON (Patient not taking: Reported on 1/31/2023)  2     Current Facility-Administered Medications   Medication Dose Route Frequency Provider Last Rate Last Admin    insulin regular (HUMULIN R;NOVOLIN R) injection 10 Units  10 Units SubCUTAneous Once Dina Pacheco MD           Review of Systems  Constitutional: No fever, no chills, no diaphoresis, no generalized weakness. HEENT: No blurred vision, No sore throat, no ear pain, no hair loss  Neck: denied any neck swelling, difficulty swallowing,   Cardio-pulmonary: No CP, SOB or palpitation, No orthopnea or PND. No cough or wheezing. GI: No N/V/D, no constipation, No abdominal pain, no melena or hematochezia   : Denied any dysuria, hematuria, flank pain, discharge, or incontinence. Skin: denied any rash, ulcer, Hirsute, or hyperpigmentation. MSK: denied any joint deformity, joint pain/swelling, muscle pain, or back pain. Neuro: no numbness, no tingling, no weakness, _    OBJECTIVE    There were no vitals taken for this visit. BP Readings from Last 4 Encounters:   01/31/23 129/78   08/02/22 (!) 149/93   02/11/21 (!) 142/86   02/28/20 (!) 170/114     Wt Readings from Last 6 Encounters:   01/31/23 220 lb (99.8 kg)   08/12/22 207 lb (93.9 kg)   08/02/22 189 lb (85.7 kg)   02/11/21 213 lb (96.6 kg)   02/28/20 220 lb 12.8 oz (100.2 kg)   01/17/20 206 lb 9.6 oz (93.7 kg)       Physical examination:  General: awake alert, oriented x3, no abnormal position or movements. HEENT: normocephalic non-traumatic, no exophthalmos   Neck: supple, no LN enlargement, no thyromegaly, no thyroid tenderness, no JVD. Pulm: Clear equal air entry no added sounds, no wheezing or rhonchi    CVS: S1 + S2, no murmur, no heave. Dorsalis pedis pulse palpable   Abd: soft lax, no tenderness, no organomegaly, audible bowel sounds. Skin: warm, no lesions, no rash. No callus, no Ulcers, No acanthosis nigricans  Musculoskeletal: No back tenderness, no kyphosis/scoliosis    Neuro: CN intact, Monofilament sensation decreased bilateral , muscle power normal  Psych: normal mood, and affect    Review of Laboratory Data:   I personally reviewed the following lab:  Lab Results   Component Value Date/Time    WBC 5.9 11/08/2022 12:58 PM    RBC 4.03 11/08/2022 12:58 PM    HGB 12.7 11/08/2022 12:58 PM    HCT 39.0 11/08/2022 12:58 PM    MCV 96.8 11/08/2022 12:58 PM    MCH 31.5 11/08/2022 12:58 PM    MCHC 32.6 11/08/2022 12:58 PM    RDW 13.6 11/08/2022 12:58 PM     11/08/2022 12:58 PM    MPV 11.2 11/08/2022 12:58 PM      Lab Results   Component Value Date/Time     11/08/2022 12:58 PM    K 4.3 11/08/2022 12:58 PM    CO2 21 (L) 11/08/2022 12:58 PM    BUN 29 (H) 11/08/2022 12:58 PM    CREATININE 2.4 (H) 11/08/2022 12:58 PM    CALCIUM 9.3 11/08/2022 12:58 PM    LABGLOM 35 11/08/2022 12:58 PM    GFRAA 41 08/02/2022 10:06 AM      Lab Results   Component Value Date/Time    TSH 0.436 09/20/2018 08:10 AM     Lab Results   Component Value Date/Time    LABA1C 8.1 11/02/2022 04:25 PM    GLUCOSE 231 11/08/2022 12:58 PM    GLUCOSE 85 03/14/2011 11:29 AM    MALBCR 48.3 05/17/2022 10:10 AM    LABMICR 49.7 05/17/2022 10:10 AM    LABCREA 103 05/17/2022 10:10 AM     Lab Results   Component Value Date/Time    LABA1C 8.1 11/02/2022 04:25 PM    LABA1C 14.8 08/01/2022 09:30 AM    LABA1C 8.4 05/17/2022 10:10 AM     Lab Results   Component Value Date/Time    TRIG 383 08/01/2022 09:30 AM    HDL 16 08/01/2022 09:30 AM    LDLCALC 63 08/01/2022 09:30 AM    CHOL 156 08/01/2022 09:30 AM     Lab Results   Component Value Date/Time    VITD25 40 08/02/2022 03:46 AM    VITD25 36 05/17/2022 10:10 AM       ASSESSMENT & RECOMMENDATIONS   Ashkan Aguirre, a 37 y.o.-old male seen in for the following issues     Diabetes Mellitus Type 2    Improving control, Due for A1c   Uses freestyle Letty and most readinsg at goal   Continue current dose of Glimepiride 4 mg daily   Take Lantus to 30 units daily at night, Humalog to 5 units with meals + following sliding scale 2:50> 150    Discussed with patient A1c and blood sugar goals   Patient will need routine diabetes  maintenance and prevention   A1c ordered     Continuous Glucose Monitoring (CGM) download and interpretation   I personally reviewed and interpreted continuous glucose monitor (CGM) download. Time in range 60%, high 40%, low 0 %. CGM report was discussed with patient including blood glucose patterns, percentages of blood glucose at goal, above goal and below goal. Insulin dosages/antidiabetic regimen was adjusted according to CGM download. Full CGM was scanned under media. Dietary noncompliance  Discussed with patient the importance of eating consistent carbohydrate meals, avoiding high glycemic index food. Also, discussed with patient the risk and negative consequences of dietary noncompliance on blood glucose control, blood pressure and weight    HLD  Contine Lipitor     I personally reviewed external notes from PCP and other patient's care team providers, and personally interpreted labs associated with the above diagnosis. I also ordered labs to further assess and manage the above addressed medical conditions. Return in about 4 months (around 6/1/2023) for DM type 2, VitD deficiency. The above issues were reviewed with the patient who understood and agreed with the plan. Thank you for allowing us to participate in the care of this patient. Please do not hesitate to contact us with any additional questions. Diagnosis Orders   1. Type 2 diabetes mellitus without complication, with long-term current use of insulin (HCC)  Hemoglobin A1C    [place] CONTINUOUS GLUCOSE MONITORING ANALYSIS I&R      2. Dietary noncompliance        3. Vitamin D deficiency              Teodoro Claudio MD  Endocrinologist, [facility] Diabetes Care and Endocrinology   [address] [place] [address]   Phone: [phone]  Fax: [phone]  --------------------------------------------  An electronic signature was used to authenticate this note.  Domo Baez MD on 2/1/2023 at 9:50 AM

## 2023-02-02 ENCOUNTER — SOCIAL WORK (OUTPATIENT)
Dept: INTERNAL MEDICINE | Age: 38
End: 2023-02-02

## 2023-02-02 LAB — MRSA CULTURE ONLY: NORMAL

## 2023-02-02 NOTE — PROGRESS NOTES
Late Entry 1/31    SW met with pt briefly related to follow up care for depression. Pt actively treats at [facility] and is service connected. Pt denies SI and reports depression is in control.  Pt to continue to follow with [facility]

## 2023-02-04 ENCOUNTER — TELEPHONE (OUTPATIENT)
Dept: INTERNAL MEDICINE CLINIC | Age: 38
End: 2023-02-04

## 2023-02-04 NOTE — TELEPHONE ENCOUNTER
I called Laurie Ortizor #[phone] to discuss results of his MRSA nares test done at recent clinic visit. Discussed with pt that results are negative and does not require treatment at the moment. He denies active lesions at this point. I discussed with Jerry Langford to call the clinic or go to the ED if skin lesion recur and or becomes more pronounce or acutely worsen. He agrees with plan. All questions were answered to his satisfaction. He wishes to cancel his March clinic appt and wants to be seen later rather than sooner.      Electronically signed by Alyssa Lee MD on 2/4/2023 at 2:04 PM

## 2023-02-26 DIAGNOSIS — I10 ESSENTIAL HYPERTENSION: ICD-10-CM

## 2023-02-27 RX ORDER — ASPIRIN 81 MG/1
TABLET ORAL
Qty: 30 TABLET | Refills: 0 | Status: SHIPPED
Start: 2023-02-27 | End: 2023-04-19

## 2023-03-14 ENCOUNTER — OFFICE VISIT (OUTPATIENT)
Dept: INTERNAL MEDICINE | Age: 38
End: 2023-03-14
Payer: COMMERCIAL

## 2023-03-14 VITALS
RESPIRATION RATE: 20 BRPM | WEIGHT: 213.4 LBS | TEMPERATURE: 97.8 F | BODY MASS INDEX: 30.55 KG/M2 | DIASTOLIC BLOOD PRESSURE: 84 MMHG | HEART RATE: 71 BPM | SYSTOLIC BLOOD PRESSURE: 139 MMHG | HEIGHT: 70 IN | OXYGEN SATURATION: 97 %

## 2023-03-14 DIAGNOSIS — N48.5 ULCER OF PENIS: ICD-10-CM

## 2023-03-14 DIAGNOSIS — E11.22 CKD STAGE 3 DUE TO TYPE 2 DIABETES MELLITUS (HCC): ICD-10-CM

## 2023-03-14 DIAGNOSIS — Z79.4 TYPE 2 DIABETES MELLITUS WITHOUT COMPLICATION, WITH LONG-TERM CURRENT USE OF INSULIN (HCC): Primary | ICD-10-CM

## 2023-03-14 DIAGNOSIS — N18.30 CKD STAGE 3 DUE TO TYPE 2 DIABETES MELLITUS (HCC): ICD-10-CM

## 2023-03-14 DIAGNOSIS — E11.9 TYPE 2 DIABETES MELLITUS WITHOUT COMPLICATION, WITH LONG-TERM CURRENT USE OF INSULIN (HCC): Primary | ICD-10-CM

## 2023-03-14 PROCEDURE — G8417 CALC BMI ABV UP PARAM F/U: HCPCS | Performed by: STUDENT IN AN ORGANIZED HEALTH CARE EDUCATION/TRAINING PROGRAM

## 2023-03-14 PROCEDURE — G8484 FLU IMMUNIZE NO ADMIN: HCPCS | Performed by: STUDENT IN AN ORGANIZED HEALTH CARE EDUCATION/TRAINING PROGRAM

## 2023-03-14 PROCEDURE — 4004F PT TOBACCO SCREEN RCVD TLK: CPT | Performed by: STUDENT IN AN ORGANIZED HEALTH CARE EDUCATION/TRAINING PROGRAM

## 2023-03-14 PROCEDURE — 83036 HEMOGLOBIN GLYCOSYLATED A1C: CPT | Performed by: STUDENT IN AN ORGANIZED HEALTH CARE EDUCATION/TRAINING PROGRAM

## 2023-03-14 PROCEDURE — 3051F HG A1C>EQUAL 7.0%<8.0%: CPT | Performed by: STUDENT IN AN ORGANIZED HEALTH CARE EDUCATION/TRAINING PROGRAM

## 2023-03-14 PROCEDURE — 3078F DIAST BP <80 MM HG: CPT | Performed by: STUDENT IN AN ORGANIZED HEALTH CARE EDUCATION/TRAINING PROGRAM

## 2023-03-14 PROCEDURE — 99212 OFFICE O/P EST SF 10 MIN: CPT | Performed by: STUDENT IN AN ORGANIZED HEALTH CARE EDUCATION/TRAINING PROGRAM

## 2023-03-14 PROCEDURE — G8427 DOCREV CUR MEDS BY ELIG CLIN: HCPCS | Performed by: STUDENT IN AN ORGANIZED HEALTH CARE EDUCATION/TRAINING PROGRAM

## 2023-03-14 PROCEDURE — 2022F DILAT RTA XM EVC RTNOPTHY: CPT | Performed by: STUDENT IN AN ORGANIZED HEALTH CARE EDUCATION/TRAINING PROGRAM

## 2023-03-14 PROCEDURE — 99214 OFFICE O/P EST MOD 30 MIN: CPT | Performed by: STUDENT IN AN ORGANIZED HEALTH CARE EDUCATION/TRAINING PROGRAM

## 2023-03-14 PROCEDURE — 3074F SYST BP LT 130 MM HG: CPT | Performed by: STUDENT IN AN ORGANIZED HEALTH CARE EDUCATION/TRAINING PROGRAM

## 2023-03-14 ASSESSMENT — ENCOUNTER SYMPTOMS
DIARRHEA: 0
SINUS PAIN: 0
SHORTNESS OF BREATH: 0
BACK PAIN: 0
COUGH: 0
BLOOD IN STOOL: 0
COLOR CHANGE: 1
ABDOMINAL PAIN: 0
NAUSEA: 0
CONSTIPATION: 0
VOMITING: 0

## 2023-03-14 NOTE — PROGRESS NOTES
[address] [address]  Internal Medicine Residency Program  [facility] Note      SUBJECTIVE:  CC: had concerns including Follow-up, Hypertension, Medication Refill, Diabetes (Patient states that his blood sugar today in the office is 274. Humalog makes him hunger), and Erectile Dysfunction. HPI:  Valerie Dodd is a 44 y.o.male with PMH of HTN, DM presenting to [facility] for genital lesion. Pt was last seen by Dr. Rosanna Garcia where he complained of a rash in her left axilla and along left chest characterized by 4 ulcers. Pt reports these healed without intervention and now there are dark flat spots where the ulcers were. Today, he is concerned about a similar solitary ulcer on his penile shaft. He denies pruritis, pain, penile discharge, dysuria, increased urinary frequency or urgency. He is sexually active with one partner and does not use condoms or any type of barrier contraceptive. Pt became upset during interview, requested male doctor for genital exam. I stepped out of the room and discussed the case with Dr. Emily Aviles who was able to perform physical examination     Review of Systems   Constitutional:  Negative for chills, diaphoresis and fever. HENT:  Negative for congestion, hearing loss, mouth sores and sinus pain. Respiratory:  Negative for cough and shortness of breath. Cardiovascular:  Negative for chest pain, palpitations and leg swelling. Gastrointestinal:  Negative for abdominal pain, blood in stool, constipation, diarrhea, nausea and vomiting. Genitourinary:  Positive for genital sores. Negative for difficulty urinating, dysuria, frequency, hematuria, penile discharge, penile pain, penile swelling, scrotal swelling, testicular pain and urgency. Musculoskeletal:  Negative for arthralgias, back pain and neck pain. Skin:  Positive for color change and rash. Neurological:  Negative for dizziness, seizures, weakness and light-headedness.      Outpatient Medications Marked as Taking for the 3/14/23 encounter (Office Visit) with Forrest Potter MD   Medication Sig Dispense Refill    aspirin (ASPIRIN LOW DOSE) 81 MG EC tablet TAKE 1 TABLET BY MOUTH EVERY DAY 30 tablet 0    ARIPiprazole (ABILIFY) 5 MG tablet TAKE 1 TABLET DAILY FOR 14 DAYS THEN 2 TABLETS DAILY      glimepiride (AMARYL) 4 MG tablet TAKE 1 TABLET BY MOUTH EVERY DAY BEFORE BREAKFAST 30 tablet 5    Continuous Blood Gluc Sensor (FREESTYLE ZAINAB 2 SENSOR) MISC Change every 14 days 3 each 5    valsartan (DIOVAN) 320 MG tablet Take 320 mg by mouth in the morning. vitamin D (CHOLECALCIFEROL) 125 MCG (5000 UT) CAPS capsule Take 5,000 Units by mouth in the morning. blood glucose test strips (ONETOUCH ULTRA) strip 1 each by In Vitro route daily As needed. 50 each 11    Continuous Blood Gluc Sensor (FREESTYLE ZAINAB 14 DAY SENSOR) MISC Please change sensor every 14 days 3 each 2    amLODIPine (NORVASC) 10 MG tablet Take 1 tablet by mouth in the morning. 30 tablet 3    sodium bicarbonate 650 MG tablet Take 1 tablet by mouth in the morning and 1 tablet at noon and 1 tablet before bedtime. 90 tablet 1    KLOR-CON M20 20 MEQ extended release tablet TAKE 1 TABLET BY MOUTH EVERY OTHER DAY 60 tablet 3    atorvastatin (LIPITOR) 20 MG tablet Take 1 tablet by mouth in the morning.  90 tablet 2    bumetanide (BUMEX) 1 MG tablet TAKE 1 TABLET BY MOUTH TWICE A DAY 30 tablet 5    metoprolol succinate (TOPROL XL) 200 MG extended release tablet TAKE 1 TABLET BY MOUTH EVERY DAY 30 tablet 3    Blood Glucose Monitoring Suppl (ONE TOUCH ULTRA 2) w/Device KIT Use to check BG 4x each day 1 kit 0    Insulin Pen Needle 32G X 4 MM MISC Use to take long acting insulin once each day 100 each 5    FLUoxetine (PROZAC) 20 MG capsule   2    minoxidil (LONITEN) 10 MG tablet TAKE 1 TABLET BY MOUTH THREE TIMES A DAY 30 tablet 0    FREESTYLE LANCETS MISC 1 each by Does not apply route daily 400 each 3    Lancets MISC 1 each by Does not apply route 4 times daily 600 each 1    traZODone (DESYREL) 50 MG tablet   2    amphetamine-dextroamphetamine (ADDERALL) 5 MG tablet TAKE 1 TABLET BY MOUTH TWICE A DAY  0    Blood Pressure Monitoring (ADULT BLOOD PRESSURE CUFF LG) KIT 1 kit by Does not apply route daily 1 kit 0       OBJECTIVE:    VS:   /84 (Site: Left Upper Arm, Position: Sitting, Cuff Size: Medium Adult)   Pulse 71   Temp 97.8 °F (36.6 °C) (Temporal)   Resp 20   Ht 5' 10\" (1.778 m)   Wt 213 lb 6.4 oz (96.8 kg)   SpO2 97%   BMI 30.62 kg/m²     EXAM:  Patient refused physical examination, but was able to show me the flat, hyperpigmented macules in his left axilla. ASSESSMENT/PLAN:  I have reviewed all pertinent PMH, PSH, FH, SH, medications and allergies and updated history as appropriate. [facility] was seen today for follow-up    Diagnoses and all orders for this visit:    Ulcer of penis  -     RPR;  Future    Patient will be transferred to Dr. Martin Gather care per preference for male physician     RTC: 2 weeks for follow up on genital lesion      I have reviewed my findings and recommendations with Shaggy Bone and Dr Ra Grewal MD PGY-2  3/14/2023 9:19 PM

## 2023-03-14 NOTE — PATIENT INSTRUCTIONS
Lab Tests to get prior to next Visit   RPR    Please follow up in 2 weeks with our clinic. Please call if your symptoms worsen or fail to improve.

## 2023-03-14 NOTE — PROGRESS NOTES
[facility]  Internal Medicine Residency Clinic    Attending Physician Statement  I have discussed the case, including pertinent history and exam findings with the resident physician. I have seen and examined the patient and the key elements of the encounter have been performed by me. I agree with the assessment, plan and orders as documented by the resident. I have reviewed all pertinent PMHx, PSHx, FamHx, SocialHx, medications, and allergies and updated history as appropriate. Patient here for routine follow up of medical problems. Penile Lesion  -patient has a solitary penile lesion on the shaft of the penis, white base and healing   -no discharge from penis or dysuria; no other swollen lymph nodes   -patient states that he has unprotected sex with a sole female and is not having unprotected high risk sexual behavior; patient does not feel his significant other is having high risk sexual behavior  -patient states that lesions are similar to lesions which developed on belly button and under armpit   -plan to test for RPR at this time 2/2 painless lesion and followup in 2 weeks for additonal evaluaton; if no improvement and RPR negative, plan for dry swab vs treatment for LGV    IDDM2  -patient left prior to evaluatin and discussion on diabetes, when patient follows up in 2 weeks plan to have further discussion on chronic medical conditions     Remainder of medical problems as per resident note.     [id number] Sandoval DO  3/14/2023 4:35 PM    Encounter time including independent chart review, discussion with patient, interpreting test results and/or external communications: 30'

## 2023-03-15 LAB — HBA1C MFR BLD: 7.5 %

## 2023-03-17 ENCOUNTER — TELEPHONE (OUTPATIENT)
Dept: INTERNAL MEDICINE | Age: 38
End: 2023-03-17

## 2023-03-17 NOTE — TELEPHONE ENCOUNTER
Attempted to contact patient to inform of labs ordered which should be completed as soon as possible. No answer, VM not set up. Electronically signed by Cyndie Rider MD on 3/17/2023 at 9:10 AM    I see today that patient went to lab to have RPR drawn, will follow.      Electronically signed by Cyndie Rider MD on 3/20/2023 at 1:16 PM

## 2023-03-20 ENCOUNTER — HOSPITAL ENCOUNTER (OUTPATIENT)
Age: 38
Discharge: HOME | End: 2023-03-20
Payer: COMMERCIAL

## 2023-03-20 DIAGNOSIS — N48.5 ULCER OF PENIS: ICD-10-CM

## 2023-03-20 PROCEDURE — 36415 COLL VENOUS BLD VENIPUNCTURE: CPT

## 2023-03-20 PROCEDURE — 86592 SYPHILIS TEST NON-TREP QUAL: CPT

## 2023-03-21 ENCOUNTER — TELEPHONE (OUTPATIENT)
Dept: ENDOCRINOLOGY | Age: 38
End: 2023-03-21

## 2023-03-21 LAB — RPR SER QL: NORMAL

## 2023-03-21 NOTE — TELEPHONE ENCOUNTER
Glimepiride 4 mg daily    Lantus to 20 units daily at night   Humalog  Med SS with meals ONLY no base

## 2023-03-21 NOTE — TELEPHONE ENCOUNTER
Pt stated you told him to go back on lantus and send in BS logs in 2 weeks.  Please review and advise

## 2023-03-26 DIAGNOSIS — I10 ESSENTIAL HYPERTENSION: ICD-10-CM

## 2023-03-27 RX ORDER — ASPIRIN 81 MG/1
TABLET ORAL
Qty: 30 TABLET | Refills: 0 | OUTPATIENT
Start: 2023-03-27

## 2023-03-28 ENCOUNTER — OFFICE VISIT (OUTPATIENT)
Dept: INTERNAL MEDICINE | Age: 38
End: 2023-03-28
Payer: COMMERCIAL

## 2023-03-28 VITALS
TEMPERATURE: 97 F | OXYGEN SATURATION: 97 % | RESPIRATION RATE: 18 BRPM | HEIGHT: 70 IN | WEIGHT: 210 LBS | SYSTOLIC BLOOD PRESSURE: 118 MMHG | HEART RATE: 78 BPM | BODY MASS INDEX: 30.06 KG/M2 | DIASTOLIC BLOOD PRESSURE: 74 MMHG

## 2023-03-28 DIAGNOSIS — N52.1 ERECTILE DISORDER DUE TO MEDICAL CONDITION IN MALE: ICD-10-CM

## 2023-03-28 DIAGNOSIS — L73.9 FOLLICULITIS: Primary | ICD-10-CM

## 2023-03-28 PROCEDURE — 99212 OFFICE O/P EST SF 10 MIN: CPT | Performed by: STUDENT IN AN ORGANIZED HEALTH CARE EDUCATION/TRAINING PROGRAM

## 2023-03-28 RX ORDER — DOXYCYCLINE HYCLATE 100 MG
100 TABLET ORAL 2 TIMES DAILY
Qty: 20 TABLET | Refills: 0 | Status: SHIPPED | OUTPATIENT
Start: 2023-03-28 | End: 2023-04-07

## 2023-03-28 ASSESSMENT — ENCOUNTER SYMPTOMS
EYES NEGATIVE: 1
GASTROINTESTINAL NEGATIVE: 1
ALLERGIC/IMMUNOLOGIC NEGATIVE: 1
RESPIRATORY NEGATIVE: 1

## 2023-03-28 NOTE — PROGRESS NOTES
[facility]      SUBJECTIVE:  Emre Cortez (:  1985) is a 44 y.o. male here for evaluation of the following chief complaint(s):  Follow-up (Has new bump on stomach area)    2 week follow up    Penile and abdominal Lesion - Acute concern  Has solitary penile lesion on shaft of penis. White base and non-healing  No discharge from penis or dysuria or enlarged LN. Has unprotected sex with one female. Does not feel his partner is high risk sexual behavior  States has similar lesions on belly button and under armpit  Tested for RPR at last visit 3/14. If no improvement and RPR negative. 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 Saw ID o 19. Was given 2 rounds of PO doxy by IM 19. 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 Patient also states that he recently switched back to bathing  Lesion assessed and there are concerns for folliculitis w/ CA-MRSA  Patient advised to wash with Dial soap, patient advised against bathing at this time and just to use showers. Doxycycline 100mg BID x10 days and bactroban ordered    Erectile Dysfunction  Patient expressed concerns of wanting to see Urologist for concerns of sexual dysfunction  Patient previously followed Dr. Ye Adams in   Urology referral sent    Other Chronic Concerns not addressed at this visit  IDDM2  A1c: 7.5% (3/14/23)    Review of Systems   Constitutional: Negative. HENT: Negative. Eyes: Negative. Respiratory: Negative. Cardiovascular: Negative. Gastrointestinal: Negative. Endocrine: Negative. Genitourinary: Negative. Musculoskeletal: Negative. Skin:  Positive for rash (dome shaped lesion near umbilicus). Allergic/Immunologic: Negative. Neurological: Negative. Hematological: Negative.     Psychiatric/Behavioral:

## 2023-03-28 NOTE — PATIENT INSTRUCTIONS
Dear Pop Raymundo,        Thank you for coming to your appointment today. I hope we have addressed all of your needs. Please make sure to do the following:  - Continue your medications as listed. - Referrals have been made to Urology:  If you do not hear from the office in 1 week, please call the number listed. - We will see each other again in 2 months    Call for a sooner appointment if you develop any acute concerns    Have a great day!         Sincerely,  Rebel Comer M.D PGY-2  3/28/2023  2:49 PM

## 2023-03-31 NOTE — PROGRESS NOTES
[facility]  Internal Medicine Residency Clinic    Attending Physician Statement  I have discussed the case, including pertinent history and exam findings with the resident physician. I have seen and examined the patient and the key elements of the encounter have been performed by me. I agree with the assessment, plan and orders as documented by the resident. I have reviewed all pertinent PMHx, PSHx, FamHx, SocialHx, medications, and allergies and updated history as appropriate. Patient presents for routine follow up of medical problems. Folliculitis  Start doxycycline, penile lesion resolved and isolated are infraumbilical, Bactroban nares    ED, suspect medication (metoprolol, Abilify, prozac) vs organic from CKD, DM, EtoH,   Referral to urology -- consider Trimix injections as PDE-5 minimal effect; libido intact. If symptoms continue consider discussion with psychiatry regarding possible med adjustment    Big dose BB but severe LVH and CKD so ideally would like to continue this med but it likely is contributory to ED. Remainder of medical problems as per resident note.     More Rod DO  3/31/2023 11:09 AM

## 2023-04-17 DIAGNOSIS — I10 ESSENTIAL HYPERTENSION: ICD-10-CM

## 2023-04-19 RX ORDER — ASPIRIN 81 MG/1
TABLET ORAL
Qty: 30 TABLET | Refills: 0 | Status: SHIPPED | OUTPATIENT
Start: 2023-04-19

## 2023-04-25 RX ORDER — BUMETANIDE 1 MG/1
TABLET ORAL
Qty: 60 TABLET | Refills: 2 | Status: SHIPPED | OUTPATIENT
Start: 2023-04-25

## 2023-05-24 DIAGNOSIS — Z79.4 TYPE 2 DIABETES MELLITUS WITH HYPERGLYCEMIA, WITH LONG-TERM CURRENT USE OF INSULIN (HCC): ICD-10-CM

## 2023-05-24 DIAGNOSIS — E11.65 TYPE 2 DIABETES MELLITUS WITH HYPERGLYCEMIA, WITH LONG-TERM CURRENT USE OF INSULIN (HCC): ICD-10-CM

## 2023-05-24 DIAGNOSIS — I10 ESSENTIAL HYPERTENSION: ICD-10-CM

## 2023-05-24 RX ORDER — ASPIRIN 81 MG/1
TABLET ORAL
Qty: 30 TABLET | Refills: 0 | OUTPATIENT
Start: 2023-05-24

## 2023-06-01 ENCOUNTER — TELEMEDICINE (OUTPATIENT)
Dept: ENDOCRINOLOGY | Age: 38
End: 2023-06-01

## 2023-06-01 DIAGNOSIS — Z79.4 TYPE 2 DIABETES MELLITUS WITHOUT COMPLICATION, WITH LONG-TERM CURRENT USE OF INSULIN (HCC): ICD-10-CM

## 2023-06-01 DIAGNOSIS — E11.9 TYPE 2 DIABETES MELLITUS WITHOUT COMPLICATION, WITH LONG-TERM CURRENT USE OF INSULIN (HCC): ICD-10-CM

## 2023-06-01 DIAGNOSIS — E55.9 VITAMIN D DEFICIENCY: Primary | ICD-10-CM

## 2023-06-01 DIAGNOSIS — E78.5 HYPERLIPIDEMIA, UNSPECIFIED HYPERLIPIDEMIA TYPE: ICD-10-CM

## 2023-06-01 RX ORDER — GLIMEPIRIDE 4 MG/1
4 TABLET ORAL
Qty: 30 TABLET | Refills: 11 | Status: SHIPPED | OUTPATIENT
Start: 2023-06-01

## 2023-06-01 RX ORDER — PEN NEEDLE, DIABETIC 30 GX5/16"
NEEDLE, DISPOSABLE MISCELLANEOUS
Qty: 200 EACH | Refills: 11 | Status: SHIPPED | OUTPATIENT
Start: 2023-06-01

## 2023-06-01 NOTE — PROGRESS NOTES
Mark Gilmore was read the following message We want to confirm that, for purposes of billing, this is a virtual visit with your provider for which we will submit a claim for reimbursement with your insurance company. You will be responsible for any copays, coinsurance amounts or other amounts not covered by your insurance company. If you do not accept this, unfortunately we will not be able to schedule or proceed with a virtual visit with the provider. Do you accept? Nnamdi Jo responded Yes .
aware that this is a billable service, which includes applicable co-pays. This Virtual Visit was conducted with patient's (and/or legal guardian's) consent. Patient identification was verified, and a caregiver was present when appropriate. The patient was located at Home: [address]  Provider was located at [facility] ([place] [address]): [address] [facility],  [address]         Total time spent for this encounter:  30 minutes     --JANINE Irizarry on 6/1/2023 at 10:46 AM    An electronic signature was used to authenticate this note.

## 2023-08-01 DIAGNOSIS — E78.5 HYPERLIPIDEMIA, UNSPECIFIED HYPERLIPIDEMIA TYPE: ICD-10-CM

## 2023-08-01 RX ORDER — ATORVASTATIN CALCIUM 20 MG/1
TABLET, FILM COATED ORAL
Qty: 90 TABLET | Refills: 0 | Status: SHIPPED | OUTPATIENT
Start: 2023-08-01

## 2023-08-01 RX ORDER — BUMETANIDE 1 MG/1
TABLET ORAL
Qty: 60 TABLET | Refills: 0 | Status: SHIPPED | OUTPATIENT
Start: 2023-08-01

## 2023-08-02 DIAGNOSIS — I10 ESSENTIAL HYPERTENSION: ICD-10-CM

## 2023-08-02 RX ORDER — ASPIRIN 81 MG/1
TABLET ORAL
Qty: 30 TABLET | Refills: 0 | Status: SHIPPED | OUTPATIENT
Start: 2023-08-02

## 2023-08-08 ENCOUNTER — OFFICE VISIT (OUTPATIENT)
Dept: INTERNAL MEDICINE | Age: 38
End: 2023-08-08

## 2023-08-08 VITALS
HEART RATE: 66 BPM | DIASTOLIC BLOOD PRESSURE: 82 MMHG | HEIGHT: 70 IN | OXYGEN SATURATION: 97 % | TEMPERATURE: 98.3 F | BODY MASS INDEX: 29.2 KG/M2 | WEIGHT: 204 LBS | RESPIRATION RATE: 16 BRPM | SYSTOLIC BLOOD PRESSURE: 116 MMHG

## 2023-08-08 DIAGNOSIS — E11.9 TYPE 2 DIABETES MELLITUS WITHOUT COMPLICATION, WITH LONG-TERM CURRENT USE OF INSULIN (HCC): ICD-10-CM

## 2023-08-08 DIAGNOSIS — E55.9 VITAMIN D DEFICIENCY: Primary | ICD-10-CM

## 2023-08-08 DIAGNOSIS — E78.5 HYPERLIPIDEMIA, UNSPECIFIED HYPERLIPIDEMIA TYPE: ICD-10-CM

## 2023-08-08 DIAGNOSIS — I10 ESSENTIAL HYPERTENSION: ICD-10-CM

## 2023-08-08 DIAGNOSIS — Z79.4 TYPE 2 DIABETES MELLITUS WITHOUT COMPLICATION, WITH LONG-TERM CURRENT USE OF INSULIN (HCC): ICD-10-CM

## 2023-08-08 DIAGNOSIS — E10.11 TYPE 1 DIABETES MELLITUS WITH KETOACIDOTIC COMA (HCC): ICD-10-CM

## 2023-08-08 RX ORDER — FLUOXETINE HYDROCHLORIDE 40 MG/1
CAPSULE ORAL
COMMUNITY
Start: 2023-07-21

## 2023-08-08 RX ORDER — GLIMEPIRIDE 4 MG/1
4 TABLET ORAL
Qty: 30 TABLET | Refills: 11 | Status: CANCELLED | OUTPATIENT
Start: 2023-08-08

## 2023-08-08 RX ORDER — INSULIN GLARGINE 100 [IU]/ML
16 INJECTION, SOLUTION SUBCUTANEOUS NIGHTLY
Qty: 10 ML | Refills: 1 | Status: SHIPPED | OUTPATIENT
Start: 2023-08-08

## 2023-08-08 RX ORDER — CALCITRIOL 0.25 UG/1
CAPSULE, LIQUID FILLED ORAL
COMMUNITY
Start: 2023-08-01

## 2023-08-08 RX ORDER — BUMETANIDE 1 MG/1
1 TABLET ORAL 2 TIMES DAILY
Qty: 60 TABLET | Refills: 0 | Status: SHIPPED | OUTPATIENT
Start: 2023-08-08

## 2023-08-08 RX ORDER — PEN NEEDLE, DIABETIC 30 GX5/16"
NEEDLE, DISPOSABLE MISCELLANEOUS
Qty: 200 EACH | Refills: 11 | Status: SHIPPED | OUTPATIENT
Start: 2023-08-08

## 2023-08-08 RX ORDER — BLOOD SUGAR DIAGNOSTIC
1 STRIP MISCELLANEOUS DAILY
Qty: 50 EACH | Refills: 11 | Status: SHIPPED | OUTPATIENT
Start: 2023-08-08

## 2023-08-08 RX ORDER — INSULIN GLARGINE 100 [IU]/ML
16 INJECTION, SOLUTION SUBCUTANEOUS NIGHTLY
Qty: 10 ML | Refills: 1 | Status: CANCELLED | OUTPATIENT
Start: 2023-08-08

## 2023-08-08 RX ORDER — ASPIRIN 81 MG/1
TABLET ORAL
Qty: 30 TABLET | Refills: 0 | Status: SHIPPED | OUTPATIENT
Start: 2023-08-08

## 2023-08-08 RX ORDER — ATORVASTATIN CALCIUM 20 MG/1
20 TABLET, FILM COATED ORAL EVERY MORNING
Qty: 90 TABLET | Refills: 1 | Status: SHIPPED | OUTPATIENT
Start: 2023-08-08

## 2023-08-08 RX ORDER — VALSARTAN 320 MG/1
320 TABLET ORAL DAILY
Qty: 90 TABLET | Refills: 1 | Status: SHIPPED | OUTPATIENT
Start: 2023-08-08

## 2023-08-08 RX ORDER — LANOLIN ALCOHOL/MO/W.PET/CERES
CREAM (GRAM) TOPICAL
COMMUNITY
Start: 2023-08-02

## 2023-08-08 RX ORDER — AMLODIPINE BESYLATE 10 MG/1
10 TABLET ORAL DAILY
Qty: 30 TABLET | Refills: 3 | Status: SHIPPED | OUTPATIENT
Start: 2023-08-08

## 2023-08-08 NOTE — PATIENT INSTRUCTIONS
Thank you for coming to your follow up appointment   Please take your medications as directed and keep your follow up appointment in 11/28/2023  Call our office if you have any questions or concerns at [phone]  Have blood work done prior to next appointment.     Marsha Schultz MD

## 2023-08-08 NOTE — PROGRESS NOTES
[address] [place] [address]  Internal Medicine Residency Clinic    Attending Physician Statement  I have discussed the case, including pertinent history and exam findings with the resident physician. I agree with the assessment, plan and orders as documented by the resident. I have reviewed all pertinent PMHx, PSHx, FamHx, SocialHx, medications, and allergies and updated history as appropriate. Patient here for routine follow up of medical problems. HTN, controlled on current medications. GFR is 34. Can use adderral with monitoring of blood pressure and max dose of 15mg daily (adjusted for renal function)  CKD, 3b, stable, continue follow up with Dr. Radha Chery  DM, insulin-requiring. A1c 8.1 (June 2023). BP and renal function. Tolerating glimperide without hypoglycemia. HLD is improved, tolerating statin. Monofilament exam normal today. Eye exam up-to-date. Stage II DD (2016), no signs of fluid overload and BP is controlled    Remainder of medical problems as per resident note. Ananya Howell MD  8/8/2023 2:58 PM

## 2023-08-08 NOTE — PROGRESS NOTES
[id number] [facility] Internal Medicine      SUBJECTIVE:  Elena Sapp (:  1985) is a 44 y.o. male here for evaluation of the following chief complaint(s):  Diabetes (Follow up ) and Medication Refill (Patient unsure which meds he needs refilled)    Patient presented to the internal medicine clinic for regular follow-up visit. On presentation the patient had no new complaints. He recently followed up with [facility]. Patient has a history of ADHD for which he was on Adderall 10 mg daily in the past.  As there were no providers in the [employer] counseling services since last 1 year the patient was off Adderall. He stated that he was having difficulty because of his ADHD in the last 1 year. As per the patient [facility] has hired a new provider so the patient wanted to get back on Adderall. During his last visit he requested for it but he was advised to get this better from the primary care provider stating that Adderall would be okay for him with all of his chronic conditions. Recommendation for Adderal:  -Patient has a history of hypertension, hyperlipidemia, type 1 diabetes and CKD stage IIIb with GFR of 34. Recommendation are as follows:  -Mild to moderate kidney impairment: No dose adjustment needed, potential exists for prolonged exposure  -Severe impairment (GFR 15 to less than 30): 15 mg dose once a day in the morning  -ESRD with dialysis: Use not recommended  So patient can be started back on 10 mg of Aderral if needed, but GFR and kidney function needs to be evaluated frequently    The patient has been compliant with all of his medications. He recently visited to nephrology clinic for his routine follow-up on their . He was seen by an ophthalmologist last year and has an appointment may be on end of this year. Never been seen by foot doctor. Foot examination was done in the clinic today.   Monofilament test was normal.  All

## 2023-08-09 ASSESSMENT — ENCOUNTER SYMPTOMS
VOICE CHANGE: 0
DIARRHEA: 0
SORE THROAT: 0
SHORTNESS OF BREATH: 0
BACK PAIN: 0
RHINORRHEA: 0
CONSTIPATION: 0
COUGH: 0
ABDOMINAL PAIN: 0
VOMITING: 0
WHEEZING: 0
NAUSEA: 0

## 2023-09-22 DIAGNOSIS — I10 ESSENTIAL HYPERTENSION: ICD-10-CM

## 2023-09-22 RX ORDER — BUMETANIDE 1 MG/1
1 TABLET ORAL 2 TIMES DAILY
Qty: 136 TABLET | Refills: 0 | Status: SHIPPED | OUTPATIENT
Start: 2023-09-22

## 2023-09-22 RX ORDER — POTASSIUM CHLORIDE 1500 MG/1
20 TABLET, EXTENDED RELEASE ORAL EVERY OTHER DAY
Qty: 34 TABLET | Refills: 0 | Status: SHIPPED | OUTPATIENT
Start: 2023-09-22 | End: 2023-11-29

## 2023-09-22 RX ORDER — ASPIRIN 81 MG/1
81 TABLET ORAL DAILY
Qty: 68 TABLET | Refills: 0 | Status: SHIPPED | OUTPATIENT
Start: 2023-09-22

## 2023-09-22 NOTE — TELEPHONE ENCOUNTER
Last Appointment:  8/8/2023  Future Appointments   Date Time Provider [address]   10/16/2023 10:00 AM JANINE Brooks - CNS [facility]   11/29/2023  1:30 PM Yakov Mayorga MD [facility]      ASA filled 8/8/23 #30 no refills (30 days = 9/7/23)    Bumex filled 8/8/23 #60 no refills (30 days = 9/7/23)    Next appt 11/29/23 (68 days)

## 2023-10-16 ENCOUNTER — TELEMEDICINE (OUTPATIENT)
Dept: ENDOCRINOLOGY | Age: 38
End: 2023-10-16
Payer: COMMERCIAL

## 2023-10-16 DIAGNOSIS — E11.65 TYPE 2 DIABETES MELLITUS WITH HYPERGLYCEMIA, WITH LONG-TERM CURRENT USE OF INSULIN (HCC): ICD-10-CM

## 2023-10-16 DIAGNOSIS — E78.5 HYPERLIPIDEMIA, UNSPECIFIED HYPERLIPIDEMIA TYPE: ICD-10-CM

## 2023-10-16 DIAGNOSIS — E55.9 VITAMIN D DEFICIENCY: ICD-10-CM

## 2023-10-16 DIAGNOSIS — Z91.119 DIETARY NONCOMPLIANCE: Primary | ICD-10-CM

## 2023-10-16 DIAGNOSIS — Z79.4 TYPE 2 DIABETES MELLITUS WITH HYPERGLYCEMIA, WITH LONG-TERM CURRENT USE OF INSULIN (HCC): ICD-10-CM

## 2023-10-16 PROCEDURE — 95251 CONT GLUC MNTR ANALYSIS I&R: CPT | Performed by: CLINICAL NURSE SPECIALIST

## 2023-10-16 PROCEDURE — G8427 DOCREV CUR MEDS BY ELIG CLIN: HCPCS | Performed by: CLINICAL NURSE SPECIALIST

## 2023-10-16 PROCEDURE — 99214 OFFICE O/P EST MOD 30 MIN: CPT | Performed by: CLINICAL NURSE SPECIALIST

## 2023-10-16 PROCEDURE — 3052F HG A1C>EQUAL 8.0%<EQUAL 9.0%: CPT | Performed by: CLINICAL NURSE SPECIALIST

## 2023-10-16 PROCEDURE — 4004F PT TOBACCO SCREEN RCVD TLK: CPT | Performed by: CLINICAL NURSE SPECIALIST

## 2023-10-16 PROCEDURE — G8417 CALC BMI ABV UP PARAM F/U: HCPCS | Performed by: CLINICAL NURSE SPECIALIST

## 2023-10-16 PROCEDURE — 2022F DILAT RTA XM EVC RTNOPTHY: CPT | Performed by: CLINICAL NURSE SPECIALIST

## 2023-10-16 PROCEDURE — G8484 FLU IMMUNIZE NO ADMIN: HCPCS | Performed by: CLINICAL NURSE SPECIALIST

## 2023-10-16 RX ORDER — GLIMEPIRIDE 4 MG/1
4 TABLET ORAL
Qty: 30 TABLET | Refills: 11 | Status: SHIPPED | OUTPATIENT
Start: 2023-10-16

## 2023-10-16 RX ORDER — INSULIN LISPRO 100 [IU]/ML
INJECTION, SOLUTION INTRAVENOUS; SUBCUTANEOUS
Qty: 5 ADJUSTABLE DOSE PRE-FILLED PEN SYRINGE | Refills: 5 | Status: SHIPPED | OUTPATIENT
Start: 2023-10-16

## 2023-10-16 RX ORDER — INSULIN GLARGINE 100 [IU]/ML
18 INJECTION, SOLUTION SUBCUTANEOUS NIGHTLY
Qty: 10 ML | Refills: 1
Start: 2023-10-16

## 2023-10-16 RX ORDER — PEN NEEDLE, DIABETIC 30 GX5/16"
NEEDLE, DISPOSABLE MISCELLANEOUS
Qty: 200 EACH | Refills: 11 | Status: SHIPPED | OUTPATIENT
Start: 2023-10-16

## 2023-11-29 ENCOUNTER — OFFICE VISIT (OUTPATIENT)
Dept: INTERNAL MEDICINE | Age: 38
End: 2023-11-29
Payer: COMMERCIAL

## 2023-11-29 VITALS
BODY MASS INDEX: 31.78 KG/M2 | TEMPERATURE: 98.7 F | DIASTOLIC BLOOD PRESSURE: 72 MMHG | WEIGHT: 222 LBS | HEIGHT: 70 IN | SYSTOLIC BLOOD PRESSURE: 131 MMHG | HEART RATE: 72 BPM | OXYGEN SATURATION: 98 %

## 2023-11-29 DIAGNOSIS — E11.69 TYPE 2 DIABETES MELLITUS WITH OTHER SPECIFIED COMPLICATION, WITHOUT LONG-TERM CURRENT USE OF INSULIN (HCC): Primary | ICD-10-CM

## 2023-11-29 DIAGNOSIS — E78.5 HYPERLIPIDEMIA, UNSPECIFIED HYPERLIPIDEMIA TYPE: ICD-10-CM

## 2023-11-29 DIAGNOSIS — I10 ESSENTIAL HYPERTENSION: ICD-10-CM

## 2023-11-29 DIAGNOSIS — F90.9 ATTENTION DEFICIT HYPERACTIVITY DISORDER (ADHD), UNSPECIFIED ADHD TYPE: ICD-10-CM

## 2023-11-29 DIAGNOSIS — I10 PRIMARY HYPERTENSION: ICD-10-CM

## 2023-11-29 DIAGNOSIS — E78.1 HYPERTRIGLYCERIDEMIA: ICD-10-CM

## 2023-11-29 DIAGNOSIS — E11.22 CKD STAGE 3 DUE TO TYPE 2 DIABETES MELLITUS (HCC): ICD-10-CM

## 2023-11-29 DIAGNOSIS — N18.30 CKD STAGE 3 DUE TO TYPE 2 DIABETES MELLITUS (HCC): ICD-10-CM

## 2023-11-29 LAB — HBA1C MFR BLD: 11.4 %

## 2023-11-29 PROCEDURE — 83036 HEMOGLOBIN GLYCOSYLATED A1C: CPT

## 2023-11-29 PROCEDURE — 4004F PT TOBACCO SCREEN RCVD TLK: CPT

## 2023-11-29 PROCEDURE — 3046F HEMOGLOBIN A1C LEVEL >9.0%: CPT

## 2023-11-29 PROCEDURE — G8484 FLU IMMUNIZE NO ADMIN: HCPCS

## 2023-11-29 PROCEDURE — 99212 OFFICE O/P EST SF 10 MIN: CPT

## 2023-11-29 PROCEDURE — 3078F DIAST BP <80 MM HG: CPT

## 2023-11-29 PROCEDURE — 99213 OFFICE O/P EST LOW 20 MIN: CPT

## 2023-11-29 PROCEDURE — G8427 DOCREV CUR MEDS BY ELIG CLIN: HCPCS

## 2023-11-29 PROCEDURE — G8417 CALC BMI ABV UP PARAM F/U: HCPCS

## 2023-11-29 PROCEDURE — 2022F DILAT RTA XM EVC RTNOPTHY: CPT

## 2023-11-29 PROCEDURE — 3074F SYST BP LT 130 MM HG: CPT

## 2023-11-29 RX ORDER — VALSARTAN 320 MG/1
320 TABLET ORAL DAILY
Qty: 90 TABLET | Refills: 1 | Status: SHIPPED | OUTPATIENT
Start: 2023-11-29

## 2023-11-29 RX ORDER — INSULIN GLARGINE 100 [IU]/ML
20 INJECTION, SOLUTION SUBCUTANEOUS NIGHTLY
Qty: 10 ML | Refills: 1 | Status: SHIPPED | OUTPATIENT
Start: 2023-11-29

## 2023-11-29 RX ORDER — ATORVASTATIN CALCIUM 20 MG/1
20 TABLET, FILM COATED ORAL EVERY MORNING
Qty: 90 TABLET | Refills: 1 | Status: SHIPPED | OUTPATIENT
Start: 2023-11-29

## 2023-11-29 RX ORDER — MINOXIDIL 10 MG/1
10 TABLET ORAL 3 TIMES DAILY
Qty: 180 TABLET | Refills: 1 | Status: SHIPPED | OUTPATIENT
Start: 2023-11-29

## 2023-11-29 RX ORDER — AMLODIPINE BESYLATE 10 MG/1
10 TABLET ORAL DAILY
Qty: 90 TABLET | Refills: 1 | Status: SHIPPED | OUTPATIENT
Start: 2023-11-29

## 2023-11-29 NOTE — PATIENT INSTRUCTIONS
Dear Kyle Matters,    Thank you for coming to your appointment today. I hope we have addressed all of your needs. Please make sure to do the following:  - Continue your medications as listed. - Please increase your long acting insulin at night from 18 to 20  - We will see each other again in 5 weeks    Call for a sooner appointment if you develop any issues or low blood sugar    Have a great day!     Sincerely,  Nedra Garza M.D  11/29/2023  2:16 PM

## 2023-12-15 DIAGNOSIS — I10 ESSENTIAL HYPERTENSION: ICD-10-CM

## 2023-12-18 RX ORDER — POTASSIUM CHLORIDE 1500 MG/1
20 TABLET, EXTENDED RELEASE ORAL EVERY OTHER DAY
Qty: 8 TABLET | Refills: 0 | Status: SHIPPED
Start: 2023-12-18 | End: 2024-01-31

## 2023-12-18 RX ORDER — ASPIRIN 81 MG/1
81 TABLET ORAL DAILY
Qty: 17 TABLET | Refills: 0 | Status: SHIPPED
Start: 2023-12-18 | End: 2024-01-31

## 2023-12-18 NOTE — TELEPHONE ENCOUNTER
Last Appointment:  11/29/23  Future Appointments   Date Time Provider Department Center   1/4/2024  8:30 AM Parveen Gonzalez MD [facility]      ASA filled 9/22/23 #68 tablets (68 days = 11/29/23)  Klor-Con 9/22/23 #34 no refills QOD (68 days = 11/29/23)  Next Visit 1/4/24 (17 days)

## 2024-01-08 ENCOUNTER — HOSPITAL ENCOUNTER (OUTPATIENT)
Age: 39
Discharge: HOME | End: 2024-01-08
Payer: COMMERCIAL

## 2024-01-08 LAB
25(OH)D3 SERPL-MCNC: 31.1 NG/ML (ref 30–100)
ALBUMIN SERPL-MCNC: 4.2 G/DL (ref 3.5–5.2)
ALP SERPL-CCNC: 76 U/L (ref 40–129)
ALT SERPL-CCNC: 14 U/L (ref 0–40)
ANION GAP SERPL CALCULATED.3IONS-SCNC: 17 MMOL/L (ref 7–16)
AST SERPL-CCNC: 13 U/L (ref 0–39)
BASOPHILS # BLD: 0.03 K/UL (ref 0–0.2)
BASOPHILS NFR BLD: 1 % (ref 0–2)
BILIRUB SERPL-MCNC: 0.3 MG/DL (ref 0–1.2)
BUN SERPL-MCNC: 23 MG/DL (ref 6–20)
CALCIUM SERPL-MCNC: 9.2 MG/DL (ref 8.6–10.2)
CHLORIDE SERPL-SCNC: 106 MMOL/L (ref 98–107)
CHOLEST SERPL-MCNC: 155 MG/DL
CO2 SERPL-SCNC: 19 MMOL/L (ref 22–29)
CREAT SERPL-MCNC: 2.3 MG/DL (ref 0.7–1.2)
EOSINOPHIL # BLD: 0.2 K/UL (ref 0.05–0.5)
EOSINOPHILS RELATIVE PERCENT: 4 % (ref 0–6)
ERYTHROCYTE [DISTWIDTH] IN BLOOD BY AUTOMATED COUNT: 13.3 % (ref 11.5–15)
GFR SERPL CREATININE-BSD FRML MDRD: 36 ML/MIN/1.73M2
GLUCOSE SERPL-MCNC: 208 MG/DL (ref 74–99)
HBA1C MFR BLD: 10.7 % (ref 4–5.6)
HCT VFR BLD AUTO: 41.2 % (ref 37–54)
HDLC SERPL-MCNC: 39 MG/DL
HGB BLD-MCNC: 13.3 G/DL (ref 12.5–16.5)
IMM GRANULOCYTES # BLD AUTO: <0.03 K/UL (ref 0–0.58)
IMM GRANULOCYTES NFR BLD: 0 % (ref 0–5)
LDLC SERPL CALC-MCNC: 94 MG/DL
LYMPHOCYTES NFR BLD: 1.36 K/UL (ref 1.5–4)
LYMPHOCYTES RELATIVE PERCENT: 25 % (ref 20–42)
MCH RBC QN AUTO: 30.4 PG (ref 26–35)
MCHC RBC AUTO-ENTMCNC: 32.3 G/DL (ref 32–34.5)
MCV RBC AUTO: 94.3 FL (ref 80–99.9)
MONOCYTES NFR BLD: 0.41 K/UL (ref 0.1–0.95)
MONOCYTES NFR BLD: 7 % (ref 2–12)
NEUTROPHILS NFR BLD: 64 % (ref 43–80)
NEUTS SEG NFR BLD: 3.54 K/UL (ref 1.8–7.3)
PHOSPHATE SERPL-MCNC: 2.2 MG/DL (ref 2.5–4.5)
PLATELET # BLD AUTO: 199 K/UL (ref 130–450)
PMV BLD AUTO: 11 FL (ref 7–12)
POTASSIUM SERPL-SCNC: 3.9 MMOL/L (ref 3.5–5)
PROT SERPL-MCNC: 7.5 G/DL (ref 6.4–8.3)
PTH-INTACT SERPL-MCNC: 78.2 PG/ML (ref 15–65)
RBC # BLD AUTO: 4.37 M/UL (ref 3.8–5.8)
SODIUM SERPL-SCNC: 142 MMOL/L (ref 132–146)
TRIGL SERPL-MCNC: 108 MG/DL
VLDLC SERPL CALC-MCNC: 22 MG/DL
WBC OTHER # BLD: 5.6 K/UL (ref 4.5–11.5)

## 2024-01-08 PROCEDURE — 80061 LIPID PANEL: CPT

## 2024-01-08 PROCEDURE — 82306 VITAMIN D 25 HYDROXY: CPT

## 2024-01-08 PROCEDURE — 36415 COLL VENOUS BLD VENIPUNCTURE: CPT

## 2024-01-08 PROCEDURE — 85025 COMPLETE CBC W/AUTO DIFF WBC: CPT

## 2024-01-08 PROCEDURE — 80053 COMPREHEN METABOLIC PANEL: CPT

## 2024-01-08 PROCEDURE — 84100 ASSAY OF PHOSPHORUS: CPT

## 2024-01-08 PROCEDURE — 83970 ASSAY OF PARATHORMONE: CPT

## 2024-01-08 PROCEDURE — 83036 HEMOGLOBIN GLYCOSYLATED A1C: CPT

## 2024-01-14 DIAGNOSIS — I10 ESSENTIAL HYPERTENSION: ICD-10-CM

## 2024-01-15 RX ORDER — ASPIRIN 81 MG/1
81 TABLET, COATED ORAL DAILY
Qty: 17 TABLET | Refills: 0 | OUTPATIENT
Start: 2024-01-15

## 2024-01-31 DIAGNOSIS — I10 ESSENTIAL HYPERTENSION: ICD-10-CM

## 2024-01-31 RX ORDER — ASPIRIN 81 MG/1
81 TABLET, COATED ORAL DAILY
Qty: 17 TABLET | Refills: 0 | Status: SHIPPED | OUTPATIENT
Start: 2024-01-31

## 2024-01-31 RX ORDER — POTASSIUM CHLORIDE 1500 MG/1
20 TABLET, EXTENDED RELEASE ORAL EVERY OTHER DAY
Qty: 8 TABLET | Refills: 0 | Status: SHIPPED | OUTPATIENT
Start: 2024-01-31

## 2024-01-31 NOTE — TELEPHONE ENCOUNTER
Last Appointment:  11/2023  Future Appointments   Date Time Provider Department Center   2/8/2024  3:30 PM Surekha Dawson APRN - CNP [facility]

## 2024-02-08 ENCOUNTER — TELEMEDICINE (OUTPATIENT)
Dept: ENDOCRINOLOGY | Age: 39
End: 2024-02-08

## 2024-02-08 DIAGNOSIS — E11.22 TYPE 2 DIABETES MELLITUS WITH STAGE 3B CHRONIC KIDNEY DISEASE, WITH LONG-TERM CURRENT USE OF INSULIN (HCC): ICD-10-CM

## 2024-02-08 DIAGNOSIS — N18.32 TYPE 2 DIABETES MELLITUS WITH STAGE 3B CHRONIC KIDNEY DISEASE, WITH LONG-TERM CURRENT USE OF INSULIN (HCC): ICD-10-CM

## 2024-02-08 DIAGNOSIS — Z79.4 TYPE 2 DIABETES MELLITUS WITH STAGE 3B CHRONIC KIDNEY DISEASE, WITH LONG-TERM CURRENT USE OF INSULIN (HCC): ICD-10-CM

## 2024-02-08 DIAGNOSIS — E78.5 HYPERLIPIDEMIA, UNSPECIFIED HYPERLIPIDEMIA TYPE: ICD-10-CM

## 2024-02-08 DIAGNOSIS — Z79.4 TYPE 2 DIABETES MELLITUS WITH HYPERGLYCEMIA, WITH LONG-TERM CURRENT USE OF INSULIN (HCC): Primary | ICD-10-CM

## 2024-02-08 DIAGNOSIS — E11.65 TYPE 2 DIABETES MELLITUS WITH HYPERGLYCEMIA, WITH LONG-TERM CURRENT USE OF INSULIN (HCC): Primary | ICD-10-CM

## 2024-02-08 DIAGNOSIS — E55.9 VITAMIN D DEFICIENCY: ICD-10-CM

## 2024-02-08 DIAGNOSIS — Z91.119 DIETARY NONCOMPLIANCE: ICD-10-CM

## 2024-02-08 RX ORDER — INSULIN GLARGINE 100 [IU]/ML
20 INJECTION, SOLUTION SUBCUTANEOUS NIGHTLY
Qty: 5 ADJUSTABLE DOSE PRE-FILLED PEN SYRINGE | Refills: 1 | Status: SHIPPED | OUTPATIENT
Start: 2024-02-08

## 2024-02-08 RX ORDER — PEN NEEDLE, DIABETIC 30 GX5/16"
NEEDLE, DISPOSABLE MISCELLANEOUS
Qty: 250 EACH | Refills: 11 | Status: SHIPPED | OUTPATIENT
Start: 2024-02-08

## 2024-02-08 RX ORDER — SEMAGLUTIDE 0.68 MG/ML
INJECTION, SOLUTION SUBCUTANEOUS
Qty: 3 ML | Refills: 5 | Status: SHIPPED | OUTPATIENT
Start: 2024-02-08

## 2024-02-08 NOTE — PROGRESS NOTES
01/08/2024 02:38 PM    RDW 13.3 01/08/2024 02:38 PM     01/08/2024 02:38 PM    MPV 11.0 01/08/2024 02:38 PM      Lab Results   Component Value Date/Time     01/08/2024 02:38 PM    K 3.9 01/08/2024 02:38 PM    CO2 19 (L) 01/08/2024 02:38 PM    BUN 23 (H) 01/08/2024 02:38 PM    CREATININE 2.3 (H) 01/08/2024 02:38 PM    CALCIUM 9.2 01/08/2024 02:38 PM    LABGLOM 36 (L) 01/08/2024 02:38 PM    GFRAA 41 08/02/2022 10:06 AM      Lab Results   Component Value Date/Time    TSH 0.436 09/20/2018 08:10 AM     Lab Results   Component Value Date/Time    LABA1C 10.7 01/08/2024 02:38 PM    GLUCOSE 208 01/08/2024 02:38 PM    GLUCOSE 85 03/14/2011 11:29 AM    MALBCR 54.7 06/15/2023 04:02 PM    LABCREA 32 06/15/2023 04:02 PM     Lab Results   Component Value Date/Time    LABA1C 10.7 01/08/2024 02:38 PM    LABA1C 11.4 11/29/2023 01:21 PM    LABA1C 8.1 06/15/2023 04:00 PM     Lab Results   Component Value Date/Time    TRIG 108 01/08/2024 02:38 PM    HDL 39 01/08/2024 02:38 PM    LDLCALC 81 06/15/2023 04:00 PM    CHOL 155 01/08/2024 02:38 PM    CHOL 136 06/15/2023 04:00 PM     Lab Results   Component Value Date/Time    VITD25 31.1 01/08/2024 02:38 PM    VITD25 38 06/15/2023 04:00 PM       ASSESSMENT & RECOMMENDATIONS   Ashkandov Aguirre, a 38 y.o.-old male seen in for the following issues     Diabetes Mellitus Type 2    Patient diabetes not well controlled. Hgb A1C 10.7 %  Continue current dose of Glimepiride 4 mg daily   Resume Lantus 20 units nightly  Continue Humalog 6 units with meals plus MBSS  Start Ozempic 0.25 mg weekly x 4 weeks then increase to 0.5 mg weekly.  No contraindications.  Side effects reviewed.  Continue marleen  Not interested in insulin pump therapy at this time.  He states he does not count carbs.  Did explain to patient that we will train him on carb counting as well as using the pump, but he still declines insulin pump therapy at this time.  Discussed with patient A1c and blood sugar goals   Patient

## 2024-02-11 DIAGNOSIS — E55.9 VITAMIN D DEFICIENCY: ICD-10-CM

## 2024-02-12 DIAGNOSIS — I10 ESSENTIAL HYPERTENSION: ICD-10-CM

## 2024-02-13 RX ORDER — BUMETANIDE 1 MG/1
1 TABLET ORAL 2 TIMES DAILY
Qty: 60 TABLET | Refills: 0 | Status: SHIPPED | OUTPATIENT
Start: 2024-02-13

## 2024-02-28 ENCOUNTER — TELEPHONE (OUTPATIENT)
Dept: ENDOCRINOLOGY | Age: 39
End: 2024-02-28

## 2024-03-07 RX ORDER — LIRAGLUTIDE 6 MG/ML
1.2 INJECTION SUBCUTANEOUS DAILY
Qty: 3 ADJUSTABLE DOSE PRE-FILLED PEN SYRINGE | Refills: 5 | Status: SHIPPED | OUTPATIENT
Start: 2024-03-07

## 2024-03-12 DIAGNOSIS — I10 ESSENTIAL HYPERTENSION: ICD-10-CM

## 2024-03-13 RX ORDER — POTASSIUM CHLORIDE 1500 MG/1
20 TABLET, EXTENDED RELEASE ORAL EVERY OTHER DAY
Qty: 60 TABLET | Refills: 0 | Status: SHIPPED | OUTPATIENT
Start: 2024-03-13

## 2024-03-13 RX ORDER — ASPIRIN 81 MG/1
81 TABLET, COATED ORAL DAILY
Qty: 90 TABLET | Refills: 0 | Status: SHIPPED | OUTPATIENT
Start: 2024-03-13

## 2024-03-26 RX ORDER — DULAGLUTIDE 0.75 MG/.5ML
INJECTION, SOLUTION SUBCUTANEOUS
Qty: 4 ADJUSTABLE DOSE PRE-FILLED PEN SYRINGE | Refills: 5 | Status: SHIPPED | OUTPATIENT
Start: 2024-03-26

## 2024-04-18 ENCOUNTER — OFFICE VISIT (OUTPATIENT)
Dept: INTERNAL MEDICINE | Age: 39
End: 2024-04-18
Payer: COMMERCIAL

## 2024-04-18 VITALS
HEART RATE: 89 BPM | SYSTOLIC BLOOD PRESSURE: 132 MMHG | OXYGEN SATURATION: 95 % | BODY MASS INDEX: 32.31 KG/M2 | TEMPERATURE: 98.1 F | RESPIRATION RATE: 18 BRPM | DIASTOLIC BLOOD PRESSURE: 76 MMHG | HEIGHT: 70 IN | WEIGHT: 225.7 LBS

## 2024-04-18 DIAGNOSIS — Z79.4 TYPE 2 DIABETES MELLITUS WITH HYPERGLYCEMIA, WITH LONG-TERM CURRENT USE OF INSULIN (HCC): ICD-10-CM

## 2024-04-18 DIAGNOSIS — E11.65 TYPE 2 DIABETES MELLITUS WITH HYPERGLYCEMIA, WITH LONG-TERM CURRENT USE OF INSULIN (HCC): ICD-10-CM

## 2024-04-18 DIAGNOSIS — E78.5 HYPERLIPIDEMIA, UNSPECIFIED HYPERLIPIDEMIA TYPE: ICD-10-CM

## 2024-04-18 DIAGNOSIS — F90.0 ATTENTION DEFICIT HYPERACTIVITY DISORDER (ADHD), PREDOMINANTLY INATTENTIVE TYPE: ICD-10-CM

## 2024-04-18 DIAGNOSIS — I10 ESSENTIAL HYPERTENSION: ICD-10-CM

## 2024-04-18 DIAGNOSIS — I10 PRIMARY HYPERTENSION: Primary | ICD-10-CM

## 2024-04-18 LAB — HBA1C MFR BLD: 8.2 %

## 2024-04-18 PROCEDURE — 83036 HEMOGLOBIN GLYCOSYLATED A1C: CPT

## 2024-04-18 PROCEDURE — 3052F HG A1C>EQUAL 8.0%<EQUAL 9.0%: CPT

## 2024-04-18 PROCEDURE — 99213 OFFICE O/P EST LOW 20 MIN: CPT

## 2024-04-18 PROCEDURE — 4004F PT TOBACCO SCREEN RCVD TLK: CPT

## 2024-04-18 PROCEDURE — G8417 CALC BMI ABV UP PARAM F/U: HCPCS

## 2024-04-18 PROCEDURE — 2022F DILAT RTA XM EVC RTNOPTHY: CPT

## 2024-04-18 PROCEDURE — 3075F SYST BP GE 130 - 139MM HG: CPT

## 2024-04-18 PROCEDURE — 99212 OFFICE O/P EST SF 10 MIN: CPT

## 2024-04-18 PROCEDURE — 3078F DIAST BP <80 MM HG: CPT

## 2024-04-18 PROCEDURE — G8427 DOCREV CUR MEDS BY ELIG CLIN: HCPCS

## 2024-04-18 RX ORDER — MIRTAZAPINE 15 MG/1
TABLET, FILM COATED ORAL
COMMUNITY
Start: 2024-03-12

## 2024-04-18 RX ORDER — NEBULIZER AND COMPRESSOR
1 EACH MISCELLANEOUS DAILY
Qty: 1 KIT | Refills: 0 | Status: CANCELLED | OUTPATIENT
Start: 2024-04-18

## 2024-04-18 RX ORDER — AMLODIPINE BESYLATE 10 MG/1
10 TABLET ORAL DAILY
Qty: 90 TABLET | Refills: 1 | Status: SHIPPED | OUTPATIENT
Start: 2024-04-18

## 2024-04-18 RX ORDER — GLIMEPIRIDE 4 MG/1
4 TABLET ORAL
Qty: 90 TABLET | Refills: 1 | Status: SHIPPED | OUTPATIENT
Start: 2024-04-18

## 2024-04-18 RX ORDER — MINOXIDIL 10 MG/1
10 TABLET ORAL 3 TIMES DAILY
Qty: 180 TABLET | Refills: 1 | Status: SHIPPED | OUTPATIENT
Start: 2024-04-18

## 2024-04-18 RX ORDER — DEXTROAMPHETAMINE SACCHARATE, AMPHETAMINE ASPARTATE, DEXTROAMPHETAMINE SULFATE AND AMPHETAMINE SULFATE 2.5; 2.5; 2.5; 2.5 MG/1; MG/1; MG/1; MG/1
TABLET ORAL
COMMUNITY
Start: 2024-04-10

## 2024-04-18 RX ORDER — ATORVASTATIN CALCIUM 20 MG/1
20 TABLET, FILM COATED ORAL EVERY MORNING
Qty: 90 TABLET | Refills: 1 | Status: SHIPPED | OUTPATIENT
Start: 2024-04-18

## 2024-04-18 RX ORDER — METOPROLOL SUCCINATE 200 MG/1
TABLET, EXTENDED RELEASE ORAL
Qty: 30 TABLET | Refills: 3 | Status: SHIPPED | OUTPATIENT
Start: 2024-04-18

## 2024-04-18 RX ORDER — FLASH GLUCOSE SENSOR
KIT MISCELLANEOUS
Qty: 3 EACH | Refills: 2 | Status: SHIPPED | OUTPATIENT
Start: 2024-04-18

## 2024-04-18 SDOH — ECONOMIC STABILITY: FOOD INSECURITY: WITHIN THE PAST 12 MONTHS, THE FOOD YOU BOUGHT JUST DIDN'T LAST AND YOU DIDN'T HAVE MONEY TO GET MORE.: NEVER TRUE

## 2024-04-18 SDOH — ECONOMIC STABILITY: INCOME INSECURITY: HOW HARD IS IT FOR YOU TO PAY FOR THE VERY BASICS LIKE FOOD, HOUSING, MEDICAL CARE, AND HEATING?: SOMEWHAT HARD

## 2024-04-18 SDOH — ECONOMIC STABILITY: FOOD INSECURITY: WITHIN THE PAST 12 MONTHS, YOU WORRIED THAT YOUR FOOD WOULD RUN OUT BEFORE YOU GOT MONEY TO BUY MORE.: NEVER TRUE

## 2024-04-18 ASSESSMENT — PATIENT HEALTH QUESTIONNAIRE - PHQ9
3. TROUBLE FALLING OR STAYING ASLEEP: MORE THAN HALF THE DAYS
4. FEELING TIRED OR HAVING LITTLE ENERGY: MORE THAN HALF THE DAYS
2. FEELING DOWN, DEPRESSED OR HOPELESS: NOT AT ALL
SUM OF ALL RESPONSES TO PHQ QUESTIONS 1-9: 4
5. POOR APPETITE OR OVEREATING: NOT AT ALL
7. TROUBLE CONCENTRATING ON THINGS, SUCH AS READING THE NEWSPAPER OR WATCHING TELEVISION: NOT AT ALL
10. IF YOU CHECKED OFF ANY PROBLEMS, HOW DIFFICULT HAVE THESE PROBLEMS MADE IT FOR YOU TO DO YOUR WORK, TAKE CARE OF THINGS AT HOME, OR GET ALONG WITH OTHER PEOPLE: SOMEWHAT DIFFICULT
SUM OF ALL RESPONSES TO PHQ QUESTIONS 1-9: 4
8. MOVING OR SPEAKING SO SLOWLY THAT OTHER PEOPLE COULD HAVE NOTICED. OR THE OPPOSITE, BEING SO FIGETY OR RESTLESS THAT YOU HAVE BEEN MOVING AROUND A LOT MORE THAN USUAL: NOT AT ALL
9. THOUGHTS THAT YOU WOULD BE BETTER OFF DEAD, OR OF HURTING YOURSELF: NOT AT ALL
1. LITTLE INTEREST OR PLEASURE IN DOING THINGS: NOT AT ALL
SUM OF ALL RESPONSES TO PHQ QUESTIONS 1-9: 4
SUM OF ALL RESPONSES TO PHQ9 QUESTIONS 1 & 2: 0
SUM OF ALL RESPONSES TO PHQ QUESTIONS 1-9: 4
6. FEELING BAD ABOUT YOURSELF - OR THAT YOU ARE A FAILURE OR HAVE LET YOURSELF OR YOUR FAMILY DOWN: NOT AT ALL

## 2024-04-18 NOTE — PROGRESS NOTES
[facility]  Internal Medicine Residency Clinic    Attending Physician Statement  I have discussed the case, including pertinent history and exam findings with the resident physician.  I agree with the assessment, plan and orders as documented by the resident. I have reviewed the relevant PMHx, PSHx, FamHx, SocialHx, medications, and allergies and updated history as appropriate.    Patient presents for routine follow up of medical problems.    DM 2, 8.2%  Improving control  Recently established with endocrinology  He continues lispro 6 +Med SSI.  He has not used basal insulin d/t concerns it is ineffective (reports no hypoglycemia)  Advised compliance with Lantus 20 units qhs, Trulcity 0.75 mg (recently started per endocrine) and he agrees.  Using CGM.    Screening:   Defers all vaccinations.    Remainder of medical problems as per resident note.    Terence Howe DO  4/18/2024 3:34 PM

## 2024-04-18 NOTE — PROGRESS NOTES
[facility]  Internal Medicine Residency Program  ACC Note      CC: had concerns including Follow-up (Pt states there are no new updates at this time.), Hypertension, and Diabetes.    HPI:Ashkan Aguirre has a PMHx of HTN, HLD, DMT2, ADHD, CKD stage III presented to the [facility] for a routine visit     A1c 8.2%, Off metformin d/t renal function, didn't tolerate farxiga in past, following Endo, has a CGM avg . Not taking 20 glarigne at night. Doesn't feel like it helps. Informed patient it takes 2-4 hours to take effect and showed the basal bolus. Patient understands and will try. He is very pleased with A1c of 8.2%. Cotn to follow endo. Referral to eye doctor given     132/76 BP    Health Care Maintenance:   Denies any vaccines today    Things to follow up:  Blood sugars    SUBJECTIVE:    ROS:  Constitutional:  Negative for appetite change, chills, fatigue, fever and unexpected weight change.   HENT:  Negative for sneezing and sore throat.    Eyes:  Negative for discharge and redness.   Respiratory:  Negative for cough, chest tightness, shortness of breath and wheezing.    Cardiovascular:  Negative for chest pain, palpitations and leg swelling.   Gastrointestinal:  Negative for N/V/C/D, abdominal distention, abdominal pain.   Genitourinary:  Negative for dysuria and increased frequency.   Musculoskeletal: Negative.    Skin: Negative.    Neurological:  Negative for dizziness, weakness, light-headedness and headaches.   Other:     I have reviewed all pertinent PMHx, PSHx, FamHx, SocialHx, medications, and allergies and updated history as appropriate.    OBJECTIVE:    VS:   /76 (Site: Left Upper Arm, Position: Sitting, Cuff Size: Large Adult)   Pulse 89   Temp 98.1 °F (36.7 °C) (Temporal)   Resp 18   Ht 1.778 m (5' 10\")   Wt 102.4 kg (225 lb 11.2 oz)   SpO2 95%   BMI 32.38 kg/m²     Physical Exam:  General: No acute distress. Awake and conversant.   Neuro: Sensation and CN II-XII

## 2024-04-18 NOTE — PATIENT INSTRUCTIONS
Dear Ashkan Aguirre,    Thank you for coming to your appointment today. I hope all of your concerns have been addressed today.     Please make sure to do the following:  Continue your medications as listed.  Please start taking your glargine 20 at night   Referrals have been made to optometry:  If you do not hear from the office in 1 week, please call the number listed.  We will see each other again in 5 months    Call for a sooner appointment if you develop any issues     Have a great day!    Sincerely,  Parveen Gonzalez M.D  4/18/2024  3:39 PM

## 2024-04-26 ENCOUNTER — TELEPHONE (OUTPATIENT)
Dept: INTERNAL MEDICINE | Age: 39
End: 2024-04-26

## 2024-04-26 NOTE — TELEPHONE ENCOUNTER
----- Message from Ashkan Aguirre sent at 4/26/2024  1:06 AM EDT -----  Regarding: New Eye problem  Contact: [phone]  Hi I was reaching out because there was no in person or video appointments for me to schedule.  I have developed an eye pain for a couple days now in my left eye. It's red and hurts. Any ideas of what I can do? I've rinsed it with saline but it's just not going away.    Thanks  Javier Aguirre

## 2024-06-09 DIAGNOSIS — I10 ESSENTIAL HYPERTENSION: ICD-10-CM

## 2024-06-10 RX ORDER — ASPIRIN 81 MG/1
81 TABLET ORAL DAILY
Qty: 90 TABLET | Refills: 1 | Status: SHIPPED | OUTPATIENT
Start: 2024-06-10

## 2024-06-10 RX ORDER — POTASSIUM CHLORIDE 1500 MG/1
20 TABLET, EXTENDED RELEASE ORAL EVERY OTHER DAY
Qty: 60 TABLET | Refills: 0 | Status: SHIPPED | OUTPATIENT
Start: 2024-06-10

## 2024-06-10 NOTE — TELEPHONE ENCOUNTER
Last Appointment:  4/18/24  No future appointments.   To follow up in September    Aspirin filled 3/13/24 #90 no refills (90 days = 6/11/24)  Klor-Con filled 3/13/24 #60 no refills (120 days = 7/11/24)

## 2024-07-05 ENCOUNTER — HOSPITAL ENCOUNTER (OUTPATIENT)
Age: 39
Discharge: HOME | End: 2024-07-05
Payer: COMMERCIAL

## 2024-07-05 ENCOUNTER — OFFICE VISIT (OUTPATIENT)
Dept: INTERNAL MEDICINE | Age: 39
End: 2024-07-05
Payer: COMMERCIAL

## 2024-07-05 VITALS
HEIGHT: 70 IN | BODY MASS INDEX: 31.64 KG/M2 | OXYGEN SATURATION: 96 % | RESPIRATION RATE: 18 BRPM | HEART RATE: 92 BPM | SYSTOLIC BLOOD PRESSURE: 131 MMHG | TEMPERATURE: 97.1 F | DIASTOLIC BLOOD PRESSURE: 73 MMHG | WEIGHT: 221 LBS

## 2024-07-05 DIAGNOSIS — I1A.0 RESISTANT HYPERTENSION: Primary | ICD-10-CM

## 2024-07-05 DIAGNOSIS — N18.30 CKD STAGE 3 DUE TO TYPE 2 DIABETES MELLITUS (HCC): ICD-10-CM

## 2024-07-05 DIAGNOSIS — E55.9 VITAMIN D DEFICIENCY: ICD-10-CM

## 2024-07-05 DIAGNOSIS — E11.22 CKD STAGE 3 DUE TO TYPE 2 DIABETES MELLITUS (HCC): ICD-10-CM

## 2024-07-05 DIAGNOSIS — Z79.4 TYPE 2 DIABETES MELLITUS WITH STAGE 3 CHRONIC KIDNEY DISEASE, WITH LONG-TERM CURRENT USE OF INSULIN, UNSPECIFIED WHETHER STAGE 3A OR 3B CKD (HCC): ICD-10-CM

## 2024-07-05 DIAGNOSIS — I10 ESSENTIAL HYPERTENSION: ICD-10-CM

## 2024-07-05 DIAGNOSIS — I10 PRIMARY HYPERTENSION: ICD-10-CM

## 2024-07-05 DIAGNOSIS — E11.22 TYPE 2 DIABETES MELLITUS WITH STAGE 3 CHRONIC KIDNEY DISEASE, WITH LONG-TERM CURRENT USE OF INSULIN, UNSPECIFIED WHETHER STAGE 3A OR 3B CKD (HCC): ICD-10-CM

## 2024-07-05 DIAGNOSIS — N18.30 TYPE 2 DIABETES MELLITUS WITH STAGE 3 CHRONIC KIDNEY DISEASE, WITH LONG-TERM CURRENT USE OF INSULIN, UNSPECIFIED WHETHER STAGE 3A OR 3B CKD (HCC): ICD-10-CM

## 2024-07-05 LAB
25(OH)D3 SERPL-MCNC: 45.9 NG/ML (ref 30–100)
ALBUMIN SERPL-MCNC: 4.3 G/DL (ref 3.5–5.2)
ALP SERPL-CCNC: 73 U/L (ref 40–129)
ALT SERPL-CCNC: 19 U/L (ref 0–40)
ANION GAP SERPL CALCULATED.3IONS-SCNC: 12 MMOL/L (ref 7–16)
AST SERPL-CCNC: 12 U/L (ref 0–39)
BASOPHILS # BLD: 0.04 K/UL (ref 0–0.2)
BASOPHILS NFR BLD: 1 % (ref 0–2)
BILIRUB SERPL-MCNC: 0.4 MG/DL (ref 0–1.2)
BUN SERPL-MCNC: 42 MG/DL (ref 6–20)
CALCIUM SERPL-MCNC: 9.3 MG/DL (ref 8.6–10.2)
CHLORIDE SERPL-SCNC: 103 MMOL/L (ref 98–107)
CHOLEST SERPL-MCNC: 134 MG/DL
CO2 SERPL-SCNC: 22 MMOL/L (ref 22–29)
CREAT SERPL-MCNC: 2.7 MG/DL (ref 0.7–1.2)
EOSINOPHIL # BLD: 0.05 K/UL (ref 0.05–0.5)
EOSINOPHILS RELATIVE PERCENT: 1 % (ref 0–6)
ERYTHROCYTE [DISTWIDTH] IN BLOOD BY AUTOMATED COUNT: 13.2 % (ref 11.5–15)
GFR, ESTIMATED: 29 ML/MIN/1.73M2
GLUCOSE P FAST SERPL-MCNC: 192 MG/DL (ref 74–99)
HBA1C MFR BLD: 10.3 % (ref 4–5.6)
HCT VFR BLD AUTO: 38.9 % (ref 37–54)
HDLC SERPL-MCNC: 40 MG/DL
HGB BLD-MCNC: 12.9 G/DL (ref 12.5–16.5)
IMM GRANULOCYTES # BLD AUTO: <0.03 K/UL (ref 0–0.58)
IMM GRANULOCYTES NFR BLD: 0 % (ref 0–5)
LDLC SERPL CALC-MCNC: 82 MG/DL
LYMPHOCYTES NFR BLD: 1.43 K/UL (ref 1.5–4)
LYMPHOCYTES RELATIVE PERCENT: 21 % (ref 20–42)
MCH RBC QN AUTO: 31.4 PG (ref 26–35)
MCHC RBC AUTO-ENTMCNC: 33.2 G/DL (ref 32–34.5)
MCV RBC AUTO: 94.6 FL (ref 80–99.9)
MONOCYTES NFR BLD: 0.55 K/UL (ref 0.1–0.95)
MONOCYTES NFR BLD: 8 % (ref 2–12)
NEUTROPHILS NFR BLD: 70 % (ref 43–80)
NEUTS SEG NFR BLD: 4.75 K/UL (ref 1.8–7.3)
PHOSPHATE SERPL-MCNC: 3.5 MG/DL (ref 2.5–4.5)
PLATELET # BLD AUTO: 196 K/UL (ref 130–450)
PMV BLD AUTO: 10.9 FL (ref 7–12)
POTASSIUM SERPL-SCNC: 4.4 MMOL/L (ref 3.5–5)
PROT SERPL-MCNC: 7.2 G/DL (ref 6.4–8.3)
PTH-INTACT SERPL-MCNC: 166.5 PG/ML (ref 15–65)
RBC # BLD AUTO: 4.11 M/UL (ref 3.8–5.8)
SODIUM SERPL-SCNC: 137 MMOL/L (ref 132–146)
TRIGL SERPL-MCNC: 61 MG/DL
VLDLC SERPL CALC-MCNC: 12 MG/DL
WBC OTHER # BLD: 6.8 K/UL (ref 4.5–11.5)

## 2024-07-05 PROCEDURE — G8417 CALC BMI ABV UP PARAM F/U: HCPCS

## 2024-07-05 PROCEDURE — 84100 ASSAY OF PHOSPHORUS: CPT

## 2024-07-05 PROCEDURE — 83036 HEMOGLOBIN GLYCOSYLATED A1C: CPT

## 2024-07-05 PROCEDURE — 4004F PT TOBACCO SCREEN RCVD TLK: CPT

## 2024-07-05 PROCEDURE — 3046F HEMOGLOBIN A1C LEVEL >9.0%: CPT

## 2024-07-05 PROCEDURE — 83970 ASSAY OF PARATHORMONE: CPT

## 2024-07-05 PROCEDURE — 3075F SYST BP GE 130 - 139MM HG: CPT

## 2024-07-05 PROCEDURE — 85025 COMPLETE CBC W/AUTO DIFF WBC: CPT

## 2024-07-05 PROCEDURE — 2022F DILAT RTA XM EVC RTNOPTHY: CPT

## 2024-07-05 PROCEDURE — 82306 VITAMIN D 25 HYDROXY: CPT

## 2024-07-05 PROCEDURE — 80053 COMPREHEN METABOLIC PANEL: CPT

## 2024-07-05 PROCEDURE — 99213 OFFICE O/P EST LOW 20 MIN: CPT

## 2024-07-05 PROCEDURE — 3078F DIAST BP <80 MM HG: CPT

## 2024-07-05 PROCEDURE — 99212 OFFICE O/P EST SF 10 MIN: CPT

## 2024-07-05 PROCEDURE — 36415 COLL VENOUS BLD VENIPUNCTURE: CPT

## 2024-07-05 PROCEDURE — 80061 LIPID PANEL: CPT

## 2024-07-05 PROCEDURE — G8427 DOCREV CUR MEDS BY ELIG CLIN: HCPCS

## 2024-07-05 RX ORDER — BUMETANIDE 1 MG/1
1 TABLET ORAL 2 TIMES DAILY
Qty: 180 TABLET | Refills: 1 | Status: SHIPPED | OUTPATIENT
Start: 2024-07-05

## 2024-07-05 RX ORDER — MULTIVITAMIN/IRON/FOLIC ACID 18MG-0.4MG
TABLET ORAL
COMMUNITY
Start: 2024-07-01 | End: 2024-07-05

## 2024-07-05 RX ORDER — POTASSIUM CHLORIDE 1500 MG/1
20 TABLET, EXTENDED RELEASE ORAL EVERY OTHER DAY
Qty: 90 TABLET | Refills: 1 | Status: SHIPPED | OUTPATIENT
Start: 2024-07-05

## 2024-07-05 NOTE — PROGRESS NOTES
[facility]  Internal Medicine Residency Program  ACC Note      CC: had concerns including Hypertension and Diabetes.    HPI:Ashkan Aguirre has a PMHx of HTN, HLD, DMT2, ADHD, CKD stage III presented to the [facility] for a routine visit     /73 no changes    Has CBG says BG has been 220's, no episodes of hypoglycemia. On 18 glargine and sliding scale. Will call endocrinology to schedule an appt. Noted also on liraglutide, sematglutide, , dulaglutide on medication list will discuss and clean up med rec as appropriate.      Will see Dr. Baez 7/9, going to get labs today. Will follow.     Health Care Maintenance:   Denies any vaccines today    Things to follow up:  Blood sugars, did he get to see endocrinology  Clean up med list with injectables     ASSESSMENT/PLAN:  1. Resistant hypertension  Overview:  Well controlled  Compliant with medications  BP Readings from Last 3 Encounters:   07/05/24 131/73   04/18/24 132/76   11/29/23 131/72      Hypertension Medications       Calcium Channel Blockers       amLODIPine (NORVASC) 10 MG tablet Take 1 tablet by mouth daily       Vasodilators       minoxidil (LONITEN) 10 MG tablet Take 1 tablet by mouth 3 times daily       Beta Blockers Cardio-Selective       metoprolol succinate (TOPROL XL) 200 MG extended release tablet TAKE 1 TABLET BY MOUTH EVERY DAY       Loop Diuretics       bumetanide (BUMEX) 1 MG tablet TAKE 1 TABLET BY MOUTH TWICE A DAY       Angiotensin II Receptor Antagonists       valsartan (DIOVAN) 320 MG tablet Take 1 tablet by mouth daily          2. Essential hypertension  -     bumetanide (BUMEX) 1 MG tablet; Take 1 tablet by mouth 2 times daily, Disp-180 tablet, R-1Normal  3. Vitamin D deficiency  -     Cholecalciferol (VITAMIN D3) 125 MCG (5000 UT) CAPS; Take 1 capsule by mouth daily, Disp-90 capsule, R-1Normal  4. Primary hypertension  Overview:  Well controlled  Compliant with medications  BP Readings from Last 3 Encounters:   07/05/24

## 2024-07-05 NOTE — PROGRESS NOTES
[facility]  Internal Medicine Residency Clinic    Attending Physician Statement  I have discussed the case, including pertinent history and exam findings with the resident physician.  I agree with the assessment, plan and orders as documented by the resident. I have reviewed the relevant PMHx, PSHx, FamHx, SocialHx, medications, and allergies and updated history as appropriate.    Patient presents for routine follow up of medical problems.    DM 2, A1c 8.2%   Recheck A1c as reported hyperglycemia.  Continues on insulin and GLP-1RA with endocrinology and recommend coordination with Endo NP for follow up. Patient reports not taking glimepiride.    CKD 3b  Follows with nephrology, Dr. Baez    Remainder of medical problems as per resident note.    Terence Howe DO  7/5/2024 2:39 PM

## 2024-07-05 NOTE — PATIENT INSTRUCTIONS
Dear Ashkan Aguirre,    Thank you for coming to your appointment today. I hope all of your concerns have been addressed today.     Please make sure to do the following:  Continue your medications as listed.  We will have endocrinology call you to make any glucose changes     Call for a sooner appointment if you develop any issues     Have a great day!    Sincerely,  Parveen Gonzalez M.D  7/5/2024  2:42 PM

## 2024-07-09 DIAGNOSIS — Z79.4 TYPE 2 DIABETES MELLITUS WITH HYPERGLYCEMIA, WITH LONG-TERM CURRENT USE OF INSULIN (HCC): Primary | ICD-10-CM

## 2024-07-09 DIAGNOSIS — E11.65 TYPE 2 DIABETES MELLITUS WITH HYPERGLYCEMIA, WITH LONG-TERM CURRENT USE OF INSULIN (HCC): Primary | ICD-10-CM

## 2024-07-09 RX ORDER — DULAGLUTIDE 1.5 MG/.5ML
INJECTION, SOLUTION SUBCUTANEOUS
Qty: 12 ADJUSTABLE DOSE PRE-FILLED PEN SYRINGE | Refills: 4 | Status: SHIPPED | OUTPATIENT
Start: 2024-07-09

## 2024-07-25 ENCOUNTER — TELEPHONE (OUTPATIENT)
Dept: ENDOCRINOLOGY | Age: 39
End: 2024-07-25

## 2024-07-25 NOTE — TELEPHONE ENCOUNTER
I attempted to review the recommendations and the patient states he will wait to discuss it with the NP at his visit

## 2024-07-25 NOTE — TELEPHONE ENCOUNTER
----- Message from JANINE Lindsay CNP sent at 7/9/2024  7:55 AM EDT -----  Regarding: FW: DM management  Letty reviewed at request of PCP.  Please call patient and let him know I recommend the following changes:    Lantus 24 units nightly  Humalog 8 units 3 times a day with meals plus moderate dose sliding scale  Increase Trulicity to 1.5 mg weekly  ----- Message -----  From: Terence Howe DO  Sent: 7/8/2024   5:11 PM EDT  To: JANINE Lindsay CNP  Subject: DM management                                    Good afternoon Surekha,    We saw this patient in clinic, he reported hyperglycemia (A1c now 10%) and we noted your office has been working to obtain GLP-RA. Just wanted to let you know for follow up. We advised he call your office and follow DM diet. He also reports that he stopped his glimepiride.     Thanks for your help,    Electronically signed by Terence Howe DO on 7/8/2024 at 5:10 PM

## 2024-07-29 ENCOUNTER — TELEMEDICINE (OUTPATIENT)
Dept: ENDOCRINOLOGY | Age: 39
End: 2024-07-29
Payer: COMMERCIAL

## 2024-07-29 DIAGNOSIS — Z79.4 TYPE 2 DIABETES MELLITUS WITH STAGE 3B CHRONIC KIDNEY DISEASE, WITH LONG-TERM CURRENT USE OF INSULIN (HCC): ICD-10-CM

## 2024-07-29 DIAGNOSIS — E78.5 HYPERLIPIDEMIA, UNSPECIFIED HYPERLIPIDEMIA TYPE: ICD-10-CM

## 2024-07-29 DIAGNOSIS — E11.65 TYPE 2 DIABETES MELLITUS WITH HYPERGLYCEMIA, WITH LONG-TERM CURRENT USE OF INSULIN (HCC): Primary | ICD-10-CM

## 2024-07-29 DIAGNOSIS — Z79.4 TYPE 2 DIABETES MELLITUS WITH HYPERGLYCEMIA, WITH LONG-TERM CURRENT USE OF INSULIN (HCC): Primary | ICD-10-CM

## 2024-07-29 DIAGNOSIS — Z91.119 DIETARY NONCOMPLIANCE: ICD-10-CM

## 2024-07-29 DIAGNOSIS — E11.22 TYPE 2 DIABETES MELLITUS WITH STAGE 3B CHRONIC KIDNEY DISEASE, WITH LONG-TERM CURRENT USE OF INSULIN (HCC): ICD-10-CM

## 2024-07-29 DIAGNOSIS — N18.32 TYPE 2 DIABETES MELLITUS WITH STAGE 3B CHRONIC KIDNEY DISEASE, WITH LONG-TERM CURRENT USE OF INSULIN (HCC): ICD-10-CM

## 2024-07-29 DIAGNOSIS — E55.9 VITAMIN D DEFICIENCY: ICD-10-CM

## 2024-07-29 PROCEDURE — 2022F DILAT RTA XM EVC RTNOPTHY: CPT | Performed by: FAMILY MEDICINE

## 2024-07-29 PROCEDURE — 99214 OFFICE O/P EST MOD 30 MIN: CPT | Performed by: FAMILY MEDICINE

## 2024-07-29 PROCEDURE — 3046F HEMOGLOBIN A1C LEVEL >9.0%: CPT | Performed by: FAMILY MEDICINE

## 2024-07-29 PROCEDURE — G8427 DOCREV CUR MEDS BY ELIG CLIN: HCPCS | Performed by: FAMILY MEDICINE

## 2024-07-29 PROCEDURE — G8417 CALC BMI ABV UP PARAM F/U: HCPCS | Performed by: FAMILY MEDICINE

## 2024-07-29 PROCEDURE — 95251 CONT GLUC MNTR ANALYSIS I&R: CPT | Performed by: FAMILY MEDICINE

## 2024-07-29 PROCEDURE — 4004F PT TOBACCO SCREEN RCVD TLK: CPT | Performed by: FAMILY MEDICINE

## 2024-07-29 RX ORDER — INSULIN GLARGINE 100 [IU]/ML
20 INJECTION, SOLUTION SUBCUTANEOUS NIGHTLY
Qty: 6 ADJUSTABLE DOSE PRE-FILLED PEN SYRINGE | Refills: 3 | Status: SHIPPED | OUTPATIENT
Start: 2024-07-29

## 2024-07-29 RX ORDER — PEN NEEDLE, DIABETIC 30 GX5/16"
NEEDLE, DISPOSABLE MISCELLANEOUS
Qty: 250 EACH | Refills: 3 | Status: SHIPPED | OUTPATIENT
Start: 2024-07-29

## 2024-07-29 NOTE — PROGRESS NOTES
[employer]  [facility] Department of Endocrinology Diabetes and Metabolism   [address], [place], [place], [address]   Phone: [phone]  Fax: [phone]    Date of Service: 7/29/2024  Primary Care Physician: Parveen Gonzalez MD  Provider: JANINE Lindsay - CNP    Reason for the visit:  DM type 2     History of Present Illness:  The history is provided by the patient. No  was used. Accuracy of the patient data is excellent.  Ashkan Aguirre is a very pleasant 38 y.o. male seen today for diabetes management     Ashkan Aguirre was diagnosed with diabetes at age of 35  and currently on Glimepiride 4 mg daily with breakfast , Lantus 20 units at night, Humalog 6u with meals +MDSS       Previous use of Farxiga-unable to tolerate due to side effects  Of metformin due to renal function      TIR 55%  Hyperglycemia  40%  Hypoglycemia 5%  Avg glucose 168  GMI 7.3%    Patient states his blood sugars are doing much better and is resistant to medication changes today.    Letty reveals frequent hypoglycemia.  Patient states this is because he often times will not eat breakfast and this causes hypoglycemia.  The hyperglycemia noted on letty download is likely due to overcorrection of hypoglycemia as he states he will often times get fast food to correct his hypoglycemia without insulin coverage.    Most recent A1c results summarized below  Lab Results   Component Value Date/Time    LABA1C 10.3 07/05/2024 03:12 PM    LABA1C 8.2 04/18/2024 03:39 PM    LABA1C 10.7 01/08/2024 02:38 PM      The patient hasn't been mindful of what has been eating and wasn't following diabetes diet    I reviewed current medications and the patient has no issues with diabetes medications  Ashkan Aguirre is up to date with eye exam and denied any history of diabetic retinopathy   The patient her performs his own feet care  Microvascular complications:  No Retinopathy, Nephropathy or Neuropathy

## 2024-09-09 RX ORDER — POTASSIUM CHLORIDE 1500 MG/1
20 TABLET, EXTENDED RELEASE ORAL EVERY OTHER DAY
Qty: 45 TABLET | Refills: 1 | OUTPATIENT
Start: 2024-09-09

## 2024-10-30 DIAGNOSIS — I10 PRIMARY HYPERTENSION: ICD-10-CM

## 2024-10-30 NOTE — TELEPHONE ENCOUNTER
Name of Medication(s) Requested:  Requested Prescriptions     Pending Prescriptions Disp Refills    valsartan (DIOVAN) 320 MG tablet [Pharmacy Med Name: VALSARTAN 320 MG TABLET] 90 tablet 1     Sig: TAKE 1 TABLET BY MOUTH EVERY DAY    amLODIPine (NORVASC) 10 MG tablet [Pharmacy Med Name: AMLODIPINE BESYLATE 10 MG TAB] 90 tablet 1     Sig: TAKE 1 TABLET BY MOUTH EVERY DAY       Medication is on current medication list Yes    Dosage and directions were verified? Yes    Quantity verified: 90 day supply     Pharmacy Verified?  Yes    Last Appointment:  7/5/2024    Future appts:  Future Appointments   Date Time Provider Department Center   12/19/2024  2:00 PM Parveen Gonzalez MD [facility] DEP        (If no appt send self scheduling link. .REFILLAPPT)  Scheduling request sent?     [] Yes  [x] No    Does patient need updated?  [] Yes  [x] No

## 2024-10-31 RX ORDER — AMLODIPINE BESYLATE 10 MG/1
10 TABLET ORAL DAILY
Qty: 90 TABLET | Refills: 1 | Status: SHIPPED | OUTPATIENT
Start: 2024-10-31

## 2024-10-31 RX ORDER — VALSARTAN 320 MG/1
320 TABLET ORAL DAILY
Qty: 90 TABLET | Refills: 1 | Status: SHIPPED | OUTPATIENT
Start: 2024-10-31

## 2024-11-10 DIAGNOSIS — E11.65 TYPE 2 DIABETES MELLITUS WITH HYPERGLYCEMIA, WITH LONG-TERM CURRENT USE OF INSULIN (HCC): ICD-10-CM

## 2024-11-10 DIAGNOSIS — Z79.4 TYPE 2 DIABETES MELLITUS WITH HYPERGLYCEMIA, WITH LONG-TERM CURRENT USE OF INSULIN (HCC): ICD-10-CM

## 2024-11-11 RX ORDER — GLIMEPIRIDE 4 MG/1
4 TABLET ORAL
Qty: 30 TABLET | Refills: 2 | Status: SHIPPED | OUTPATIENT
Start: 2024-11-11

## 2024-12-19 ENCOUNTER — OFFICE VISIT (OUTPATIENT)
Dept: INTERNAL MEDICINE | Age: 39
End: 2024-12-19
Payer: COMMERCIAL

## 2024-12-19 VITALS
OXYGEN SATURATION: 100 % | TEMPERATURE: 98.2 F | SYSTOLIC BLOOD PRESSURE: 123 MMHG | BODY MASS INDEX: 33.89 KG/M2 | WEIGHT: 236.7 LBS | HEART RATE: 91 BPM | DIASTOLIC BLOOD PRESSURE: 78 MMHG | RESPIRATION RATE: 18 BRPM | HEIGHT: 70 IN

## 2024-12-19 DIAGNOSIS — N18.30 CKD STAGE 3 DUE TO TYPE 2 DIABETES MELLITUS (HCC): ICD-10-CM

## 2024-12-19 DIAGNOSIS — I1A.0 RESISTANT HYPERTENSION: ICD-10-CM

## 2024-12-19 DIAGNOSIS — I10 ESSENTIAL HYPERTENSION: ICD-10-CM

## 2024-12-19 DIAGNOSIS — E55.9 VITAMIN D DEFICIENCY: ICD-10-CM

## 2024-12-19 DIAGNOSIS — N18.30 TYPE 2 DIABETES MELLITUS WITH STAGE 3 CHRONIC KIDNEY DISEASE, WITH LONG-TERM CURRENT USE OF INSULIN, UNSPECIFIED WHETHER STAGE 3A OR 3B CKD (HCC): Primary | ICD-10-CM

## 2024-12-19 DIAGNOSIS — E11.22 TYPE 2 DIABETES MELLITUS WITH STAGE 3 CHRONIC KIDNEY DISEASE, WITH LONG-TERM CURRENT USE OF INSULIN, UNSPECIFIED WHETHER STAGE 3A OR 3B CKD (HCC): Primary | ICD-10-CM

## 2024-12-19 DIAGNOSIS — E78.5 HYPERLIPIDEMIA, UNSPECIFIED HYPERLIPIDEMIA TYPE: ICD-10-CM

## 2024-12-19 DIAGNOSIS — Z79.4 TYPE 2 DIABETES MELLITUS WITH HYPERGLYCEMIA, WITH LONG-TERM CURRENT USE OF INSULIN (HCC): ICD-10-CM

## 2024-12-19 DIAGNOSIS — E11.22 CKD STAGE 3 DUE TO TYPE 2 DIABETES MELLITUS (HCC): ICD-10-CM

## 2024-12-19 DIAGNOSIS — Z79.4 TYPE 2 DIABETES MELLITUS WITH STAGE 3 CHRONIC KIDNEY DISEASE, WITH LONG-TERM CURRENT USE OF INSULIN, UNSPECIFIED WHETHER STAGE 3A OR 3B CKD (HCC): Primary | ICD-10-CM

## 2024-12-19 DIAGNOSIS — E11.65 TYPE 2 DIABETES MELLITUS WITH HYPERGLYCEMIA, WITH LONG-TERM CURRENT USE OF INSULIN (HCC): ICD-10-CM

## 2024-12-19 DIAGNOSIS — I10 PRIMARY HYPERTENSION: ICD-10-CM

## 2024-12-19 PROBLEM — E10.11 TYPE 1 DIABETES MELLITUS WITH KETOACIDOTIC COMA (HCC): Status: RESOLVED | Noted: 2024-12-19 | Resolved: 2024-12-19

## 2024-12-19 PROBLEM — E10.11 TYPE 1 DIABETES MELLITUS WITH KETOACIDOTIC COMA (HCC): Status: ACTIVE | Noted: 2024-12-19

## 2024-12-19 LAB — HBA1C MFR BLD: 8 %

## 2024-12-19 PROCEDURE — 99212 OFFICE O/P EST SF 10 MIN: CPT

## 2024-12-19 PROCEDURE — 83036 HEMOGLOBIN GLYCOSYLATED A1C: CPT

## 2024-12-19 RX ORDER — BUMETANIDE 1 MG/1
1 TABLET ORAL 2 TIMES DAILY
Qty: 180 TABLET | Refills: 1 | Status: SHIPPED | OUTPATIENT
Start: 2024-12-19

## 2024-12-19 RX ORDER — MINOXIDIL 10 MG/1
10 TABLET ORAL 3 TIMES DAILY
Qty: 180 TABLET | Refills: 1 | Status: SHIPPED | OUTPATIENT
Start: 2024-12-19

## 2024-12-19 RX ORDER — ATORVASTATIN CALCIUM 20 MG/1
20 TABLET, FILM COATED ORAL EVERY MORNING
Qty: 90 TABLET | Refills: 1 | Status: SHIPPED | OUTPATIENT
Start: 2024-12-19

## 2024-12-19 RX ORDER — PEN NEEDLE, DIABETIC 30 GX5/16"
NEEDLE, DISPOSABLE MISCELLANEOUS
Qty: 250 EACH | Refills: 3 | Status: SHIPPED | OUTPATIENT
Start: 2024-12-19

## 2024-12-19 RX ORDER — FLASH GLUCOSE SENSOR
KIT MISCELLANEOUS
Qty: 3 EACH | Refills: 2 | Status: SHIPPED | OUTPATIENT
Start: 2024-12-19

## 2024-12-19 RX ORDER — METOPROLOL SUCCINATE 200 MG/1
TABLET, EXTENDED RELEASE ORAL
Qty: 90 TABLET | Refills: 1 | Status: SHIPPED | OUTPATIENT
Start: 2024-12-19

## 2024-12-19 RX ORDER — FINERENONE 10 MG/1
TABLET, FILM COATED ORAL
COMMUNITY
Start: 2024-12-06

## 2024-12-19 RX ORDER — POTASSIUM CHLORIDE 1500 MG/1
20 TABLET, EXTENDED RELEASE ORAL EVERY OTHER DAY
Qty: 90 TABLET | Refills: 1 | Status: SHIPPED | OUTPATIENT
Start: 2024-12-19

## 2024-12-19 RX ORDER — ASPIRIN 81 MG/1
81 TABLET ORAL DAILY
Qty: 90 TABLET | Refills: 1 | Status: SHIPPED | OUTPATIENT
Start: 2024-12-19

## 2024-12-19 NOTE — PATIENT INSTRUCTIONS
Dear Ashkan Aguirre,    Thank you for coming to your appointment today. I hope all of your concerns have been addressed.     Please make sure to do the following:  Continue your medications as listed.  Please get labs and images before our next visit. You will be contacted if there is anything abnormal otherwise your results will be discussed on your next visit. You are welcome to call anytime and inquire about the results.   Please call endocrinology for   You will see you PCP in week of 4/7    Call for a sooner appointment if you develop any issues     Have a great day!    Sincerely,  Parveen Gonzalez M.D  12/19/2024  2:38 PM

## 2024-12-19 NOTE — PROGRESS NOTES
[facility]  Internal Medicine Residency Program  ACC Note      CC: had concerns including Follow-up (Pt states there are no new updates at this time. ), Hypertension, and Diabetes.    HPI:Ashkan Aguirre has a PMHx of HTN, HLD, DMT2, ADHD, CKD stage IV presented to the [facility] for a routine visit     /78     123 BG currently, A1c 8.0   Gaining weight consider inc trulicity.     Has CBG says BG has been 220's, no episodes of hypoglycemia. On 18 glargine and sliding scale. Will call endocrinology to schedule an appt. Noted also on liraglutide, sematglutide, , dulaglutide on medication list will discuss and clean up med rec as appropriate.     Currently on glimepiride 4 w/ breakfast, glargine 20, lispro 6 w/ MDSS, trulicity 1.5 weekly  Off metformin d/t renal function  Off farxiga d/t Side effects  Being managed by Surekha king  Episodes of hypoglycemia d/t skipping breakfast but still taking insulin and glimepiride. Was recommended to stop glimepiride but pt declined.   Uses CGM Letty  Diabetic foot exam 12/19/24 no neuropathy    Labs reviewed by Dr. Baez, CKD progressing to stage IV, no anemia. Added kerendia 10     Health Care Maintenance:   Denies any vaccines today    Things to follow up:  Diabetes   Labs from neprhology, if not ordered would get microalbumin     ASSESSMENT/PLAN:  1. Type 2 diabetes mellitus with stage 3 chronic kidney disease, with long-term current use of insulin, unspecified whether stage 3a or 3b CKD (HCC)  Overview:  Diabetes since 35  Uses freestyle Letty CGM, avg   Microvascular complications - last eye referral given, last foot exam 12/19/24 no neuropathy, neuropathy none  Macrovascular complications - no CAD, PVD or stroke  Off metformin d/t renal function  Off farxiga d/t Side effects  Being managed by Surekha king  Episodes of hypoglycemia d/t skipping breakfast but still taking insulin and glimepiride. Was recommended to stop glimepiride but pt

## 2025-01-10 ENCOUNTER — HOSPITAL ENCOUNTER (OUTPATIENT)
Age: 40
Discharge: HOME | End: 2025-01-10
Payer: COMMERCIAL

## 2025-01-10 LAB
BASOPHILS # BLD: 0.04 K/UL (ref 0–0.2)
BASOPHILS NFR BLD: 1 % (ref 0–2)
CREAT UR-MCNC: 51.8 MG/DL (ref 40–278)
EOSINOPHIL # BLD: 0.12 K/UL (ref 0.05–0.5)
EOSINOPHILS RELATIVE PERCENT: 2 % (ref 0–6)
ERYTHROCYTE [DISTWIDTH] IN BLOOD BY AUTOMATED COUNT: 13.8 % (ref 11.5–15)
HBA1C MFR BLD: 7.8 % (ref 4–5.6)
HCT VFR BLD AUTO: 40.2 % (ref 37–54)
HGB BLD-MCNC: 13 G/DL (ref 12.5–16.5)
IMM GRANULOCYTES # BLD AUTO: <0.03 K/UL (ref 0–0.58)
IMM GRANULOCYTES NFR BLD: 0 % (ref 0–5)
LYMPHOCYTES NFR BLD: 1.72 K/UL (ref 1.5–4)
LYMPHOCYTES RELATIVE PERCENT: 26 % (ref 20–42)
MCH RBC QN AUTO: 30.7 PG (ref 26–35)
MCHC RBC AUTO-ENTMCNC: 32.3 G/DL (ref 32–34.5)
MCV RBC AUTO: 95 FL (ref 80–99.9)
MICROALBUMIN UR-MCNC: 38 MG/L (ref 0–19)
MICROALBUMIN/CREAT UR-RTO: 73 MCG/MG CREAT (ref 0–30)
MONOCYTES NFR BLD: 0.51 K/UL (ref 0.1–0.95)
MONOCYTES NFR BLD: 8 % (ref 2–12)
NEUTROPHILS NFR BLD: 64 % (ref 43–80)
NEUTS SEG NFR BLD: 4.34 K/UL (ref 1.8–7.3)
PHOSPHATE SERPL-MCNC: 3.5 MG/DL (ref 2.5–4.5)
PLATELET # BLD AUTO: 230 K/UL (ref 130–450)
PMV BLD AUTO: 10.4 FL (ref 7–12)
PTH-INTACT SERPL-MCNC: 146.6 PG/ML (ref 15–65)
RBC # BLD AUTO: 4.23 M/UL (ref 3.8–5.8)
WBC OTHER # BLD: 6.7 K/UL (ref 4.5–11.5)

## 2025-01-10 PROCEDURE — 83970 ASSAY OF PARATHORMONE: CPT

## 2025-01-10 PROCEDURE — 84100 ASSAY OF PHOSPHORUS: CPT

## 2025-01-10 PROCEDURE — 83036 HEMOGLOBIN GLYCOSYLATED A1C: CPT

## 2025-01-10 PROCEDURE — 85025 COMPLETE CBC W/AUTO DIFF WBC: CPT

## 2025-01-10 PROCEDURE — 36415 COLL VENOUS BLD VENIPUNCTURE: CPT

## 2025-01-10 PROCEDURE — 82043 UR ALBUMIN QUANTITATIVE: CPT

## 2025-01-10 PROCEDURE — 82570 ASSAY OF URINE CREATININE: CPT

## 2025-01-14 LAB
ALBUMIN SERPL-MCNC: 4.3 G/DL (ref 3.5–5.2)
ALP SERPL-CCNC: 66 U/L (ref 40–129)
ALT SERPL-CCNC: 36 U/L (ref 0–40)
ANION GAP SERPL CALCULATED.3IONS-SCNC: 16 MMOL/L (ref 7–16)
AST SERPL-CCNC: 21 U/L (ref 0–39)
BILIRUB SERPL-MCNC: 0.6 MG/DL (ref 0–1.2)
BUN SERPL-MCNC: 39 MG/DL (ref 6–20)
CALCIUM SERPL-MCNC: 9.8 MG/DL (ref 8.6–10.2)
CHLORIDE SERPL-SCNC: 106 MMOL/L (ref 98–107)
CO2 SERPL-SCNC: 21 MMOL/L (ref 22–29)
CREAT SERPL-MCNC: 2.9 MG/DL (ref 0.7–1.2)
GFR, ESTIMATED: 27 ML/MIN/1.73M2
GLUCOSE SERPL-MCNC: 109 MG/DL (ref 74–99)
POTASSIUM SERPL-SCNC: 4.9 MMOL/L (ref 3.5–5)
PROT SERPL-MCNC: 7.2 G/DL (ref 6.4–8.3)
SODIUM SERPL-SCNC: 143 MMOL/L (ref 132–146)

## 2025-02-17 DIAGNOSIS — E10.11 TYPE 1 DIABETES MELLITUS WITH KETOACIDOSIS WITH COMA (HCC): ICD-10-CM

## 2025-02-18 RX ORDER — INSULIN LISPRO 100 [IU]/ML
INJECTION, SOLUTION INTRAVENOUS; SUBCUTANEOUS
Qty: 45 ML | Refills: 0 | Status: SHIPPED | OUTPATIENT
Start: 2025-02-18

## 2025-02-18 NOTE — TELEPHONE ENCOUNTER
7/29/24: Continue current dose of Glimepiride 4 mg daily , Lantus 20 units nightly, Humalog 6 units 3 times a day with meals plus moderate dose sliding scale,

## 2025-05-01 ENCOUNTER — TELEMEDICINE (OUTPATIENT)
Dept: ENDOCRINOLOGY | Age: 40
End: 2025-05-01

## 2025-05-01 DIAGNOSIS — E11.22 TYPE 2 DIABETES MELLITUS WITH STAGE 3B CHRONIC KIDNEY DISEASE, WITH LONG-TERM CURRENT USE OF INSULIN (HCC): ICD-10-CM

## 2025-05-01 DIAGNOSIS — Z79.4 TYPE 2 DIABETES MELLITUS WITH HYPERGLYCEMIA, WITH LONG-TERM CURRENT USE OF INSULIN (HCC): Primary | ICD-10-CM

## 2025-05-01 DIAGNOSIS — Z91.119 DIETARY NONCOMPLIANCE: ICD-10-CM

## 2025-05-01 DIAGNOSIS — E78.5 HYPERLIPIDEMIA, UNSPECIFIED HYPERLIPIDEMIA TYPE: ICD-10-CM

## 2025-05-01 DIAGNOSIS — E55.9 VITAMIN D DEFICIENCY: ICD-10-CM

## 2025-05-01 DIAGNOSIS — Z79.4 TYPE 2 DIABETES MELLITUS WITH STAGE 3B CHRONIC KIDNEY DISEASE, WITH LONG-TERM CURRENT USE OF INSULIN (HCC): ICD-10-CM

## 2025-05-01 DIAGNOSIS — E10.11 TYPE 1 DIABETES MELLITUS WITH KETOACIDOSIS WITH COMA (HCC): ICD-10-CM

## 2025-05-01 DIAGNOSIS — E11.65 TYPE 2 DIABETES MELLITUS WITH HYPERGLYCEMIA, WITH LONG-TERM CURRENT USE OF INSULIN (HCC): Primary | ICD-10-CM

## 2025-05-01 DIAGNOSIS — N18.32 TYPE 2 DIABETES MELLITUS WITH STAGE 3B CHRONIC KIDNEY DISEASE, WITH LONG-TERM CURRENT USE OF INSULIN (HCC): ICD-10-CM

## 2025-05-01 RX ORDER — GLIMEPIRIDE 4 MG/1
4 TABLET ORAL
Qty: 30 TABLET | Refills: 11 | Status: SHIPPED | OUTPATIENT
Start: 2025-05-01

## 2025-05-01 RX ORDER — INSULIN GLARGINE 100 [IU]/ML
20 INJECTION, SOLUTION SUBCUTANEOUS NIGHTLY
Qty: 5 ADJUSTABLE DOSE PRE-FILLED PEN SYRINGE | Refills: 11 | Status: SHIPPED | OUTPATIENT
Start: 2025-05-01

## 2025-05-01 RX ORDER — BLOOD-GLUCOSE SENSOR
EACH MISCELLANEOUS
Qty: 2 EACH | Refills: 11 | Status: SHIPPED | OUTPATIENT
Start: 2025-05-01

## 2025-05-01 RX ORDER — INSULIN LISPRO 100 [IU]/ML
INJECTION, SOLUTION INTRAVENOUS; SUBCUTANEOUS
Qty: 5 ADJUSTABLE DOSE PRE-FILLED PEN SYRINGE | Refills: 11 | Status: SHIPPED | OUTPATIENT
Start: 2025-05-01

## 2025-05-01 NOTE — PROGRESS NOTES
Ashkan Aguirre was read the following message “We want to confirm that, for purposes of billing, this is a virtual visit with your provider for which we will submit a claim for reimbursement with your insurance company. You will be responsible for any copays, coinsurance amounts or other amounts not covered by your insurance company. If you do not accept this, unfortunately we will not be able to schedule a virtual visit with the provider. Do you accept? Ashkan responded YES

## 2025-05-01 NOTE — PROGRESS NOTES
[facility]  [facility] Department of Endocrinology Diabetes and Metabolism   [address], [place], [place], [address]   Phone: [phone]  Fax: [phone]    Date of Service: 5/1/2025  Primary Care Physician: Parveen Gonzalez MD  Provider: JANINE Lindsay - CNP    Reason for the visit:  DM type 2     History of Present Illness:  The history is provided by the patient. No  was used. Accuracy of the patient data is excellent.  Ashkan Aguirre is a very pleasant 39 y.o. male seen today for diabetes management     Ashkan Aguirre was diagnosed with diabetes at age of 35  and currently on Glimepiride 4 mg daily with breakfast , Trulicity 1.5 mg weekly, Lantus 20 units at night, Humalog 6u with meals +MDSS       Previous use of Farxiga-unable to tolerate due to side effects  Of metformin due to renal function      TIR 65%  Hyperglycemia  34%  Hypoglycemia 1%  Avg glucose 168    Letty reveals improved hypoglycemia    Most recent A1c results summarized below  Lab Results   Component Value Date/Time    LABA1C 7.8 01/10/2025 02:56 PM    LABA1C 8.0 12/19/2024 02:53 PM    LABA1C 10.3 07/05/2024 03:12 PM      The patient hasn't been mindful of what has been eating and wasn't following diabetes diet    I reviewed current medications and the patient has no issues with diabetes medications  Ashkan Aguirre is due for eye exam   The patient her performs his own feet care  Microvascular complications:  No Retinopathy, Nephropathy or Neuropathy   Macrovascular complications: no CAD, PVD, or Stroke    PAST MEDICAL HISTORY   Past Medical History:   Diagnosis Date    Chest pain     Diabetic ketoacidosis without coma associated with type 2 diabetes mellitus (HCC) 7/31/2022    HTN (hypertension)     Hydrocele, left 10/28/2010       PAST SURGICAL HISTORY   Past Surgical History:   Procedure Laterality Date    HYDROCELE EXCISION  November 2010       SOCIAL HISTORY   Tobacco:   reports that

## 2025-05-13 ENCOUNTER — HOSPITAL ENCOUNTER (OUTPATIENT)
Age: 40
Discharge: HOME | End: 2025-05-13
Payer: COMMERCIAL

## 2025-05-13 LAB
25(OH)D3 SERPL-MCNC: 57 NG/ML (ref 30–100)
ALBUMIN SERPL-MCNC: 4 G/DL (ref 3.5–5.2)
ALP SERPL-CCNC: 80 U/L (ref 40–129)
ALT SERPL-CCNC: 23 U/L (ref 0–50)
ANION GAP SERPL CALCULATED.3IONS-SCNC: 12 MMOL/L (ref 7–16)
AST SERPL-CCNC: 23 U/L (ref 0–50)
BASOPHILS # BLD: 0.04 K/UL (ref 0–0.2)
BASOPHILS NFR BLD: 1 % (ref 0–2)
BILIRUB SERPL-MCNC: 0.4 MG/DL (ref 0–1.2)
BUN SERPL-MCNC: 35 MG/DL (ref 6–20)
CALCIUM SERPL-MCNC: 9.4 MG/DL (ref 8.6–10)
CHLORIDE SERPL-SCNC: 107 MMOL/L (ref 98–107)
CHOLEST SERPL-MCNC: 129 MG/DL
CO2 SERPL-SCNC: 20 MMOL/L (ref 22–29)
CREAT SERPL-MCNC: 3 MG/DL (ref 0.7–1.2)
CREAT UR-MCNC: 65.2 MG/DL (ref 40–278)
EOSINOPHIL # BLD: 0.08 K/UL (ref 0.05–0.5)
EOSINOPHILS RELATIVE PERCENT: 1 % (ref 0–6)
ERYTHROCYTE [DISTWIDTH] IN BLOOD BY AUTOMATED COUNT: 13.5 % (ref 11.5–15)
GFR, ESTIMATED: 26 ML/MIN/1.73M2
GLUCOSE P FAST SERPL-MCNC: 144 MG/DL (ref 74–99)
HBA1C MFR BLD: 8.7 % (ref 4–5.6)
HCT VFR BLD AUTO: 33.8 % (ref 37–54)
HDLC SERPL-MCNC: 39 MG/DL
HGB BLD-MCNC: 11.2 G/DL (ref 12.5–16.5)
IMM GRANULOCYTES # BLD AUTO: 0.03 K/UL (ref 0–0.58)
IMM GRANULOCYTES NFR BLD: 0 % (ref 0–5)
LDLC SERPL CALC-MCNC: 74 MG/DL
LYMPHOCYTES NFR BLD: 1.11 K/UL (ref 1.5–4)
LYMPHOCYTES RELATIVE PERCENT: 17 % (ref 20–42)
MCH RBC QN AUTO: 31.2 PG (ref 26–35)
MCHC RBC AUTO-ENTMCNC: 33.1 G/DL (ref 32–34.5)
MCV RBC AUTO: 94.2 FL (ref 80–99.9)
MICROALBUMIN UR-MCNC: 58 MG/L (ref 0–20)
MICROALBUMIN/CREAT UR-RTO: 89 MCG/MG CREAT (ref 0–30)
MONOCYTES NFR BLD: 0.54 K/UL (ref 0.1–0.95)
MONOCYTES NFR BLD: 8 % (ref 2–12)
NEUTROPHILS NFR BLD: 73 % (ref 43–80)
NEUTS SEG NFR BLD: 4.89 K/UL (ref 1.8–7.3)
PHOSPHATE SERPL-MCNC: 2.7 MG/DL (ref 2.5–4.5)
PLATELET # BLD AUTO: 272 K/UL (ref 130–450)
PMV BLD AUTO: 10.3 FL (ref 7–12)
POTASSIUM SERPL-SCNC: 4.9 MMOL/L (ref 3.5–5.1)
PROT SERPL-MCNC: 7.4 G/DL (ref 6.4–8.3)
PTH-INTACT SERPL-MCNC: 106 PG/ML (ref 15–65)
RBC # BLD AUTO: 3.59 M/UL (ref 3.8–5.8)
SODIUM SERPL-SCNC: 139 MMOL/L (ref 136–145)
TRIGL SERPL-MCNC: 82 MG/DL
VLDLC SERPL CALC-MCNC: 16 MG/DL
WBC OTHER # BLD: 6.7 K/UL (ref 4.5–11.5)

## 2025-05-13 PROCEDURE — 80061 LIPID PANEL: CPT

## 2025-05-13 PROCEDURE — 36415 COLL VENOUS BLD VENIPUNCTURE: CPT

## 2025-05-13 PROCEDURE — 80053 COMPREHEN METABOLIC PANEL: CPT

## 2025-05-13 PROCEDURE — 82570 ASSAY OF URINE CREATININE: CPT

## 2025-05-13 PROCEDURE — 82043 UR ALBUMIN QUANTITATIVE: CPT

## 2025-05-13 PROCEDURE — 82306 VITAMIN D 25 HYDROXY: CPT

## 2025-05-13 PROCEDURE — 84100 ASSAY OF PHOSPHORUS: CPT

## 2025-05-13 PROCEDURE — 83036 HEMOGLOBIN GLYCOSYLATED A1C: CPT

## 2025-05-13 PROCEDURE — 85025 COMPLETE CBC W/AUTO DIFF WBC: CPT

## 2025-05-13 PROCEDURE — 83970 ASSAY OF PARATHORMONE: CPT

## 2025-06-02 ENCOUNTER — OFFICE VISIT (OUTPATIENT)
Age: 40
End: 2025-06-02
Payer: COMMERCIAL

## 2025-06-02 VITALS
HEART RATE: 99 BPM | SYSTOLIC BLOOD PRESSURE: 134 MMHG | RESPIRATION RATE: 18 BRPM | BODY MASS INDEX: 32.5 KG/M2 | DIASTOLIC BLOOD PRESSURE: 88 MMHG | WEIGHT: 227 LBS | OXYGEN SATURATION: 99 % | HEIGHT: 70 IN | TEMPERATURE: 97 F

## 2025-06-02 DIAGNOSIS — E78.5 HYPERLIPIDEMIA, UNSPECIFIED HYPERLIPIDEMIA TYPE: ICD-10-CM

## 2025-06-02 DIAGNOSIS — E11.22 CKD STAGE 3 DUE TO TYPE 2 DIABETES MELLITUS (HCC): ICD-10-CM

## 2025-06-02 DIAGNOSIS — I10 ESSENTIAL HYPERTENSION: ICD-10-CM

## 2025-06-02 DIAGNOSIS — N18.30 CKD STAGE 3 DUE TO TYPE 2 DIABETES MELLITUS (HCC): ICD-10-CM

## 2025-06-02 DIAGNOSIS — I10 PRIMARY HYPERTENSION: ICD-10-CM

## 2025-06-02 DIAGNOSIS — E55.9 VITAMIN D DEFICIENCY: ICD-10-CM

## 2025-06-02 DIAGNOSIS — N18.30 TYPE 2 DIABETES MELLITUS WITH STAGE 3 CHRONIC KIDNEY DISEASE, WITH LONG-TERM CURRENT USE OF INSULIN, UNSPECIFIED WHETHER STAGE 3A OR 3B CKD (HCC): ICD-10-CM

## 2025-06-02 DIAGNOSIS — E11.65 TYPE 2 DIABETES MELLITUS WITH HYPERGLYCEMIA, WITH LONG-TERM CURRENT USE OF INSULIN (HCC): ICD-10-CM

## 2025-06-02 DIAGNOSIS — E11.22 TYPE 2 DIABETES MELLITUS WITH STAGE 3 CHRONIC KIDNEY DISEASE, WITH LONG-TERM CURRENT USE OF INSULIN, UNSPECIFIED WHETHER STAGE 3A OR 3B CKD (HCC): ICD-10-CM

## 2025-06-02 DIAGNOSIS — I1A.0 RESISTANT HYPERTENSION: Primary | ICD-10-CM

## 2025-06-02 DIAGNOSIS — Z79.4 TYPE 2 DIABETES MELLITUS WITH STAGE 3 CHRONIC KIDNEY DISEASE, WITH LONG-TERM CURRENT USE OF INSULIN, UNSPECIFIED WHETHER STAGE 3A OR 3B CKD (HCC): ICD-10-CM

## 2025-06-02 DIAGNOSIS — Z79.4 TYPE 2 DIABETES MELLITUS WITH HYPERGLYCEMIA, WITH LONG-TERM CURRENT USE OF INSULIN (HCC): ICD-10-CM

## 2025-06-02 PROCEDURE — 3052F HG A1C>EQUAL 8.0%<EQUAL 9.0%: CPT

## 2025-06-02 PROCEDURE — 3075F SYST BP GE 130 - 139MM HG: CPT

## 2025-06-02 PROCEDURE — 3079F DIAST BP 80-89 MM HG: CPT

## 2025-06-02 PROCEDURE — 99213 OFFICE O/P EST LOW 20 MIN: CPT

## 2025-06-02 PROCEDURE — 2022F DILAT RTA XM EVC RTNOPTHY: CPT

## 2025-06-02 PROCEDURE — 99212 OFFICE O/P EST SF 10 MIN: CPT

## 2025-06-02 PROCEDURE — 4004F PT TOBACCO SCREEN RCVD TLK: CPT

## 2025-06-02 PROCEDURE — G8417 CALC BMI ABV UP PARAM F/U: HCPCS

## 2025-06-02 PROCEDURE — G8427 DOCREV CUR MEDS BY ELIG CLIN: HCPCS

## 2025-06-02 RX ORDER — BUMETANIDE 1 MG/1
1 TABLET ORAL 2 TIMES DAILY
Qty: 180 TABLET | Refills: 2 | Status: SHIPPED | OUTPATIENT
Start: 2025-06-02

## 2025-06-02 RX ORDER — MINOXIDIL 10 MG/1
10 TABLET ORAL 3 TIMES DAILY
Qty: 180 TABLET | Refills: 2 | Status: CANCELLED | OUTPATIENT
Start: 2025-06-02

## 2025-06-02 RX ORDER — BLOOD-GLUCOSE SENSOR
1 EACH MISCELLANEOUS
Qty: 2 EACH | Refills: 11 | Status: CANCELLED | OUTPATIENT
Start: 2025-06-02

## 2025-06-02 RX ORDER — AMLODIPINE BESYLATE 10 MG/1
10 TABLET ORAL DAILY
Qty: 90 TABLET | Refills: 2 | Status: SHIPPED | OUTPATIENT
Start: 2025-06-02

## 2025-06-02 RX ORDER — POTASSIUM CHLORIDE 1500 MG/1
20 TABLET, EXTENDED RELEASE ORAL EVERY OTHER DAY
Qty: 90 TABLET | Refills: 2 | Status: SHIPPED | OUTPATIENT
Start: 2025-06-02

## 2025-06-02 RX ORDER — ASPIRIN 81 MG/1
81 TABLET ORAL DAILY
Qty: 90 TABLET | Refills: 2 | Status: SHIPPED | OUTPATIENT
Start: 2025-06-02

## 2025-06-02 RX ORDER — ATORVASTATIN CALCIUM 20 MG/1
20 TABLET, FILM COATED ORAL EVERY MORNING
Qty: 90 TABLET | Refills: 2 | Status: SHIPPED | OUTPATIENT
Start: 2025-06-02

## 2025-06-02 RX ORDER — METOPROLOL SUCCINATE 200 MG/1
200 TABLET, EXTENDED RELEASE ORAL DAILY
Qty: 90 TABLET | Refills: 2 | Status: SHIPPED | OUTPATIENT
Start: 2025-06-02

## 2025-06-02 RX ORDER — VALSARTAN 320 MG/1
320 TABLET ORAL DAILY
Qty: 90 TABLET | Refills: 2 | Status: SHIPPED | OUTPATIENT
Start: 2025-06-02

## 2025-06-02 SDOH — ECONOMIC STABILITY: FOOD INSECURITY: WITHIN THE PAST 12 MONTHS, YOU WORRIED THAT YOUR FOOD WOULD RUN OUT BEFORE YOU GOT MONEY TO BUY MORE.: NEVER TRUE

## 2025-06-02 SDOH — ECONOMIC STABILITY: FOOD INSECURITY: WITHIN THE PAST 12 MONTHS, THE FOOD YOU BOUGHT JUST DIDN'T LAST AND YOU DIDN'T HAVE MONEY TO GET MORE.: NEVER TRUE

## 2025-06-02 SDOH — ECONOMIC STABILITY: FOOD INSECURITY: HOW HARD IS IT FOR YOU TO PAY FOR THE VERY BASICS LIKE FOOD, HOUSING, MEDICAL CARE, AND HEATING?: NOT HARD AT ALL

## 2025-06-02 ASSESSMENT — PATIENT HEALTH QUESTIONNAIRE - PHQ9
10. IF YOU CHECKED OFF ANY PROBLEMS, HOW DIFFICULT HAVE THESE PROBLEMS MADE IT FOR YOU TO DO YOUR WORK, TAKE CARE OF THINGS AT HOME, OR GET ALONG WITH OTHER PEOPLE: NOT DIFFICULT AT ALL
3. TROUBLE FALLING OR STAYING ASLEEP: NOT AT ALL
SUM OF ALL RESPONSES TO PHQ QUESTIONS 1-9: 0
SUM OF ALL RESPONSES TO PHQ QUESTIONS 1-9: 0
1. LITTLE INTEREST OR PLEASURE IN DOING THINGS: NOT AT ALL
SUM OF ALL RESPONSES TO PHQ QUESTIONS 1-9: 0
5. POOR APPETITE OR OVEREATING: NOT AT ALL
SUM OF ALL RESPONSES TO PHQ QUESTIONS 1-9: 0
2. FEELING DOWN, DEPRESSED OR HOPELESS: NOT AT ALL
7. TROUBLE CONCENTRATING ON THINGS, SUCH AS READING THE NEWSPAPER OR WATCHING TELEVISION: NOT AT ALL
8. MOVING OR SPEAKING SO SLOWLY THAT OTHER PEOPLE COULD HAVE NOTICED. OR THE OPPOSITE, BEING SO FIGETY OR RESTLESS THAT YOU HAVE BEEN MOVING AROUND A LOT MORE THAN USUAL: NOT AT ALL
9. THOUGHTS THAT YOU WOULD BE BETTER OFF DEAD, OR OF HURTING YOURSELF: NOT AT ALL
6. FEELING BAD ABOUT YOURSELF - OR THAT YOU ARE A FAILURE OR HAVE LET YOURSELF OR YOUR FAMILY DOWN: NOT AT ALL
4. FEELING TIRED OR HAVING LITTLE ENERGY: NOT AT ALL

## 2025-06-02 ASSESSMENT — LIFESTYLE VARIABLES
HOW MANY STANDARD DRINKS CONTAINING ALCOHOL DO YOU HAVE ON A TYPICAL DAY: 3 OR 4
HOW OFTEN DO YOU HAVE A DRINK CONTAINING ALCOHOL: MONTHLY OR LESS

## 2025-06-02 NOTE — PATIENT INSTRUCTIONS
Dear Ashkan Aguirre,    Thank you for coming to your appointment today. I hope all of your concerns have been addressed.     Please make sure to do the following:  Continue your medications as listed.  Please get labs and images before our next visit. You will be contacted if there is anything abnormal otherwise your results will be discussed on your next visit. You are welcome to call anytime and inquire about the results.   You will see you PCP in 5 months    Call for a sooner appointment if you develop any issues     Have a great day!    Sincerely,  Parveen Gonzalez M.D  6/2/2025  2:21 PM

## 2025-06-02 NOTE — PROGRESS NOTES
[facility]  Internal Medicine Residency Clinic    Attending Physician Statement  I have discussed the case, including pertinent history and exam findings with the resident physician.I agree with the assessment, plan and orders as documented by the resident. I have reviewed the relevant PMHx, PSHx, FamHx, SocialHx, medications, and allergies and updated history as appropriate.    Patient presents for routine follow up of medical problems.   Case discussed with PGY3 in detail  39-year-old man with poorly controlled diabetes progressing towards chronic kidney disease  Better glycemic control recommended and avoid hypoglycemia especially patient being on glimepiride  Patient is being followed by nephrology  Avoid nephrotoxins including ibuprofen etc.  Blood pressure LDL control seems appropriate  Remainder of medical problems as per resident note.        Davi Maya MD  6/2/2025 3:20 PM

## 2025-06-02 NOTE — PROGRESS NOTES
[facility]  Internal Medicine Residency Program  ACC Note      CC: had concerns including Chronic Kidney Disease and Diabetes.    HPI:Ashkan Aguirre has a PMHx of HTN, HLD, DMT2, ADHD, CKD stage IV presented to the [facility] for a routine visit     /88 no changes    Currently on glimepiride 4 w/ breakfast, glargine 20, lispro 6 w/ MDSS, trulicity 1.5 weekly  Off metformin d/t renal function  Off farxiga d/t Side effects  Being managed by Surekha Dawson endo  Episodes of hypoglycemia d/t skipping breakfast but still taking insulin and glimepiride. Was recommended to stop glimepiride but pt declined.   Uses CGM Letty avg BG now 147  Diabetic foot exam 12/19/24 no neuropathy    Labs reviewed by Dr. Baez, CKD stage IV, no anemia. Cont kerendia 10, baseline Cr 3.0    Health Care Maintenance:   Declines any vaccines today    Things to follow up:  Diabetes   Labs from neprhology, if not ordered would get microalbumin     ASSESSMENT/PLAN:  1. Resistant hypertension  Overview:  Well controlled  Compliant with medications  BP Readings from Last 3 Encounters:   12/19/24 123/78   07/05/24 131/73   04/18/24 132/76      Hypertension Medications       Calcium Channel Blockers       amLODIPine (NORVASC) 10 MG tablet TAKE 1 TABLET BY MOUTH EVERY DAY       Vasodilators       minoxidil (LONITEN) 10 MG tablet Take 1 tablet by mouth 3 times daily       Beta Blockers Cardio-Selective       metoprolol succinate (TOPROL XL) 200 MG extended release tablet TAKE 1 TABLET BY MOUTH EVERY DAY       Loop Diuretics       bumetanide (BUMEX) 1 MG tablet Take 1 tablet by mouth 2 times daily       Angiotensin II Receptor Antagonists       valsartan (DIOVAN) 320 MG tablet TAKE 1 TABLET BY MOUTH EVERY DAY          2. Type 2 diabetes mellitus with stage 3 chronic kidney disease, with long-term current use of insulin, unspecified whether stage 3a or 3b CKD (HCC)  Overview:  Diabetes since 35  Uses freestyle Letty CGM, avg BG

## 2025-06-10 DIAGNOSIS — I10 PRIMARY HYPERTENSION: ICD-10-CM

## 2025-06-10 RX ORDER — MINOXIDIL 10 MG/1
10 TABLET ORAL 3 TIMES DAILY
Qty: 180 TABLET | Refills: 1 | Status: SHIPPED | OUTPATIENT
Start: 2025-06-10

## 2025-06-10 NOTE — TELEPHONE ENCOUNTER
Name of Medication(s) Requested:  Requested Prescriptions     Pending Prescriptions Disp Refills    minoxidil (LONITEN) 10 MG tablet [Pharmacy Med Name: MINOXIDIL 10 MG TABLET] 180 tablet 1     Sig: TAKE 1 TABLET BY MOUTH THREE TIMES A DAY       Medication is on current medication list Yes    Dosage and directions were verified? Yes    Quantity verified: 90 day supply     Pharmacy Verified?  Yes    Last Appointment:  6/2/2025    Future appts:  Future Appointments   Date Time Provider Department Center   10/1/2025  1:30 PM [facility]        (If no appt send self scheduling link. .REFILLAPPT)  Scheduling request sent?     [] Yes  [x] No    Does patient need updated?  [] Yes  [x] No